# Patient Record
Sex: FEMALE | Race: WHITE | NOT HISPANIC OR LATINO | Employment: STUDENT | URBAN - METROPOLITAN AREA
[De-identification: names, ages, dates, MRNs, and addresses within clinical notes are randomized per-mention and may not be internally consistent; named-entity substitution may affect disease eponyms.]

---

## 2018-05-22 RX ORDER — MULTIVITAMIN/IRON/FOLIC ACID 18MG-0.4MG
1 TABLET ORAL DAILY
Refills: 1 | COMMUNITY
Start: 2018-04-03 | End: 2020-05-01 | Stop reason: ALTCHOICE

## 2018-05-23 ENCOUNTER — OFFICE VISIT (OUTPATIENT)
Dept: INTERNAL MEDICINE CLINIC | Facility: CLINIC | Age: 18
End: 2018-05-23
Payer: COMMERCIAL

## 2018-05-23 ENCOUNTER — TELEPHONE (OUTPATIENT)
Dept: INTERNAL MEDICINE CLINIC | Facility: CLINIC | Age: 18
End: 2018-05-23

## 2018-05-23 ENCOUNTER — APPOINTMENT (OUTPATIENT)
Dept: LAB | Facility: HOSPITAL | Age: 18
End: 2018-05-23
Payer: COMMERCIAL

## 2018-05-23 VITALS
SYSTOLIC BLOOD PRESSURE: 104 MMHG | BODY MASS INDEX: 39.15 KG/M2 | WEIGHT: 235 LBS | HEIGHT: 65 IN | HEART RATE: 94 BPM | OXYGEN SATURATION: 98 % | DIASTOLIC BLOOD PRESSURE: 86 MMHG

## 2018-05-23 DIAGNOSIS — L73.2 HIDRADENITIS SUPPURATIVA: Primary | ICD-10-CM

## 2018-05-23 DIAGNOSIS — K50.119 CROHN'S DISEASE OF COLON WITH COMPLICATION (HCC): ICD-10-CM

## 2018-05-23 DIAGNOSIS — R35.89 POLYURIA: ICD-10-CM

## 2018-05-23 DIAGNOSIS — R63.5 WEIGHT GAIN, ABNORMAL: ICD-10-CM

## 2018-05-23 PROBLEM — K52.9 IBD (INFLAMMATORY BOWEL DISEASE): Status: ACTIVE | Noted: 2017-09-06

## 2018-05-23 LAB
25(OH)D3 SERPL-MCNC: 23 NG/ML (ref 30–100)
ALBUMIN SERPL BCP-MCNC: 3.8 G/DL (ref 3.5–5)
ALP SERPL-CCNC: 79 U/L (ref 46–384)
ALT SERPL W P-5'-P-CCNC: 31 U/L (ref 12–78)
ANION GAP SERPL CALCULATED.3IONS-SCNC: 8 MMOL/L (ref 4–13)
AST SERPL W P-5'-P-CCNC: 21 U/L (ref 5–45)
BACTERIA UR QL AUTO: ABNORMAL /HPF
BASOPHILS # BLD AUTO: 0.07 THOUSANDS/ΜL (ref 0–0.1)
BASOPHILS NFR BLD AUTO: 1 % (ref 0–1)
BILIRUB SERPL-MCNC: 0.8 MG/DL (ref 0.2–1)
BILIRUB UR QL STRIP: NEGATIVE
BUN SERPL-MCNC: 11 MG/DL (ref 5–25)
CALCIUM SERPL-MCNC: 9.5 MG/DL (ref 8.3–10.1)
CHLORIDE SERPL-SCNC: 103 MMOL/L (ref 100–108)
CHOLEST SERPL-MCNC: 113 MG/DL (ref 50–200)
CLARITY UR: ABNORMAL
CO2 SERPL-SCNC: 28 MMOL/L (ref 21–32)
COLOR UR: YELLOW
CREAT SERPL-MCNC: 0.89 MG/DL (ref 0.6–1.3)
EOSINOPHIL # BLD AUTO: 0.41 THOUSAND/ΜL (ref 0–0.61)
EOSINOPHIL NFR BLD AUTO: 4 % (ref 0–6)
ERYTHROCYTE [DISTWIDTH] IN BLOOD BY AUTOMATED COUNT: 12.9 % (ref 11.6–15.1)
GFR SERPL CREATININE-BSD FRML MDRD: 94 ML/MIN/1.73SQ M
GLUCOSE P FAST SERPL-MCNC: 91 MG/DL (ref 65–99)
GLUCOSE UR STRIP-MCNC: NEGATIVE MG/DL
HCT VFR BLD AUTO: 44.1 % (ref 34.8–46.1)
HDLC SERPL-MCNC: 43 MG/DL (ref 40–60)
HGB BLD-MCNC: 14.4 G/DL (ref 11.5–15.4)
HGB UR QL STRIP.AUTO: ABNORMAL
KETONES UR STRIP-MCNC: NEGATIVE MG/DL
LDLC SERPL CALC-MCNC: 55 MG/DL (ref 0–100)
LEUKOCYTE ESTERASE UR QL STRIP: ABNORMAL
LYMPHOCYTES # BLD AUTO: 1.95 THOUSANDS/ΜL (ref 0.6–4.47)
LYMPHOCYTES NFR BLD AUTO: 17 % (ref 14–44)
MCH RBC QN AUTO: 29.4 PG (ref 26.8–34.3)
MCHC RBC AUTO-ENTMCNC: 32.7 G/DL (ref 31.4–37.4)
MCV RBC AUTO: 90 FL (ref 82–98)
MONOCYTES # BLD AUTO: 0.66 THOUSAND/ΜL (ref 0.17–1.22)
MONOCYTES NFR BLD AUTO: 6 % (ref 4–12)
NEUTROPHILS # BLD AUTO: 8.43 THOUSANDS/ΜL (ref 1.85–7.62)
NEUTS SEG NFR BLD AUTO: 73 % (ref 43–75)
NITRITE UR QL STRIP: NEGATIVE
NON-SQ EPI CELLS URNS QL MICRO: ABNORMAL /HPF
NONHDLC SERPL-MCNC: 70 MG/DL
NRBC BLD AUTO-RTO: 0 /100 WBCS
PH UR STRIP.AUTO: 6 [PH] (ref 4.5–8)
PLATELET # BLD AUTO: 342 THOUSANDS/UL (ref 149–390)
PMV BLD AUTO: 10.1 FL (ref 8.9–12.7)
POTASSIUM SERPL-SCNC: 3.9 MMOL/L (ref 3.5–5.3)
PROT SERPL-MCNC: 8.9 G/DL (ref 6.4–8.2)
PROT UR STRIP-MCNC: ABNORMAL MG/DL
RBC # BLD AUTO: 4.9 MILLION/UL (ref 3.81–5.12)
RBC #/AREA URNS AUTO: ABNORMAL /HPF
SL AMB POCT HEMOGLOBIN AIC: 5.4
SODIUM SERPL-SCNC: 139 MMOL/L (ref 136–145)
SP GR UR STRIP.AUTO: 1.02 (ref 1–1.03)
TRIGL SERPL-MCNC: 74 MG/DL
TSH SERPL DL<=0.05 MIU/L-ACNC: 1.49 UIU/ML (ref 0.46–3.98)
UROBILINOGEN UR QL STRIP.AUTO: 0.2 E.U./DL
WBC # BLD AUTO: 11.52 THOUSAND/UL (ref 4.31–10.16)
WBC #/AREA URNS AUTO: ABNORMAL /HPF

## 2018-05-23 PROCEDURE — 82306 VITAMIN D 25 HYDROXY: CPT | Performed by: INTERNAL MEDICINE

## 2018-05-23 PROCEDURE — 84443 ASSAY THYROID STIM HORMONE: CPT | Performed by: INTERNAL MEDICINE

## 2018-05-23 PROCEDURE — 81001 URINALYSIS AUTO W/SCOPE: CPT | Performed by: INTERNAL MEDICINE

## 2018-05-23 PROCEDURE — 3725F SCREEN DEPRESSION PERFORMED: CPT | Performed by: INTERNAL MEDICINE

## 2018-05-23 PROCEDURE — 80061 LIPID PANEL: CPT | Performed by: INTERNAL MEDICINE

## 2018-05-23 PROCEDURE — 99204 OFFICE O/P NEW MOD 45 MIN: CPT | Performed by: INTERNAL MEDICINE

## 2018-05-23 PROCEDURE — 36415 COLL VENOUS BLD VENIPUNCTURE: CPT | Performed by: INTERNAL MEDICINE

## 2018-05-23 PROCEDURE — 80053 COMPREHEN METABOLIC PANEL: CPT | Performed by: INTERNAL MEDICINE

## 2018-05-23 PROCEDURE — 85025 COMPLETE CBC W/AUTO DIFF WBC: CPT | Performed by: INTERNAL MEDICINE

## 2018-05-23 PROCEDURE — 83036 HEMOGLOBIN GLYCOSYLATED A1C: CPT | Performed by: INTERNAL MEDICINE

## 2018-05-23 RX ORDER — BIOTIN 1 MG
1000 TABLET ORAL
COMMUNITY
End: 2020-05-01 | Stop reason: ALTCHOICE

## 2018-05-23 RX ORDER — ACETAMINOPHEN 10 MG/ML
650 INJECTION, SOLUTION INTRAVENOUS EVERY 6 HOURS
COMMUNITY
End: 2020-05-01 | Stop reason: ALTCHOICE

## 2018-05-23 NOTE — PROGRESS NOTES
Assessment/Plan:    No problem-specific Assessment & Plan notes found for this encounter  Diagnoses and all orders for this visit:    Hidradenitis suppurativa  -     Ambulatory referral to Dermatology; Future    Crohn's disease of colon with complication [facility])  -     Ambulatory referral to Gastroenterology; Future  -     CBC and differential  -     Comprehensive metabolic panel    Weight gain, abnormal  -     Comprehensive metabolic panel  -     Lipid panel  -     TSH, 3rd generation with T4 reflex  -     Vitamin D 25 hydroxy  -     POCT hemoglobin A1c    Polyuria  -     Urinalysis with microscopic    Other orders  -     Biotin 1000 MCG tablet; Take 1,000 mcg by mouth  -     Adalimumab 40 MG/0 8ML PNKT; Inject 40 mg under the skin  -     acetaminophen (OFIRMEV); Take 650 mg by mouth every 6 (six) hours        Subjective:      Patient ID: Alejandra Motley is a 21 y o  female  This is a pleasant 21year-old female here to establish care  Her past medical history significant for Crohn's disease when she was 13years of age  She has been with Dr Peggy Darden for last several years and has been on Humira  She has Crohn's disease moderate to severe without any complications  including fistula  She has had few flare up   About 1 a year for which she has required prednisone  The CT scan done a few years ago as well as a colonoscopy reports is in the chart and was reviewed today  She also has Hydradenitis suppurative   By description of her cystic nodular pustules that she gets on her armpits and groin 1st last several years  She has a seen a dermatologist in the past and has received antibiotics and incision and drainage without much help to her condition  She also mentions weight gain about 15 lb the last 5 months  She also voices polyphagia coli dip see a and polyuria  Hemoglobin A1c in the office was 5 4  She does not report any menstrual regularity          The following portions of the patient's history were reviewed and updated as appropriate: allergies, current medications, past family history, past medical history, past social history, past surgical history and problem list       Review of Systems   Constitutional: Positive for unexpected weight change (15 lb  weight gain last 5 months)  Negative for activity change, chills and fever  HENT: Negative for congestion, facial swelling, sore throat and trouble swallowing  Eyes: Negative for pain  Respiratory: Negative for cough and shortness of breath  Cardiovascular: Negative for chest pain and leg swelling  Gastrointestinal: Positive for abdominal pain ( see above) and diarrhea  Negative for nausea and vomiting  Endocrine: Positive for polydipsia, polyphagia and polyuria  Negative for cold intolerance and heat intolerance  Genitourinary: Negative for difficulty urinating and dysuria  Musculoskeletal: Negative for arthralgias  Skin:         History of pustular outbreaks in the axilla as well as groin for the last 5-6 years  Presentation consistent with an  Hidradenitis suppurative   Neurological: Negative for dizziness and weakness  Psychiatric/Behavioral: Negative for dysphoric mood  The patient is not nervous/anxious  Current Outpatient Prescriptions:     Adalimumab 40 MG/0 8ML PNKT, Inject 40 mg under the skin, Disp: , Rfl:     acetaminophen (OFIRMEV), Take 650 mg by mouth every 6 (six) hours, Disp: , Rfl:     Biotin 1000 MCG tablet, Take 1,000 mcg by mouth, Disp: , Rfl:     Multiple Vitamins-Minerals ([employer] SPECTRAVITE ULTRA WOMEN) TABS, Take 1 tablet by mouth daily, Disp: , Rfl: 1    Objective:      /86 (BP Location: Left arm, Patient Position: Sitting, Cuff Size: Extra-Large)   Pulse 94   Ht 5' 5" (1 651 m)   Wt 107 kg (235 lb)   SpO2 98%   BMI 39 11 kg/m²          Physical Exam   Constitutional: She is oriented to person, place, and time  She appears well-nourished  No distress     HENT:   Mouth/Throat: Oropharyngeal exudate present  Eyes: Conjunctivae are normal  No scleral icterus  Neck: No thyromegaly present  Cardiovascular: Normal rate and normal heart sounds  No murmur heard  Pulmonary/Chest: Effort normal and breath sounds normal  No respiratory distress  She has no wheezes  She has no rales  Abdominal: Soft  Bowel sounds are normal  She exhibits no distension  There is tenderness (Diffuse tenderness)  There is no rebound and no guarding  purplish striae lower abdomen   Musculoskeletal: Normal range of motion  She exhibits no edema  Lymphadenopathy:     She has no cervical adenopathy  Neurological: She is alert and oriented to person, place, and time  Skin: Rash noted  Several scars on the armpit bilaterally as well as an groin upper thighs bilaterally  There is 1  Enlarged cyst   Psychiatric: She has a normal mood and affect   Her behavior is normal  Judgment and thought content normal

## 2018-05-23 NOTE — TELEPHONE ENCOUNTER
Patient is scheduled at [facility] phone #[phone] this Friday 5/25/2018 at 1pm  [address]  Left detailed message on patient cell

## 2018-05-23 NOTE — TELEPHONE ENCOUNTER
I contacted [facility] regarding Dr Melissa Neff being assigned as pcp to her [employer]  She was coming up as a specialist   I spoke with Louis Matos (5/23 @ 2:00) in the Utilization Mgt Dept ,  who did a one time approval for 5/23-5/29  Auth # [id number]  Louis Matos advised that Caryn Grullon call them to have everything taken care of  Monica Driscollgunner as much info as I could for when she reaches out to them

## 2018-05-25 ENCOUNTER — TELEPHONE (OUTPATIENT)
Dept: INTERNAL MEDICINE CLINIC | Facility: CLINIC | Age: 18
End: 2018-05-25

## 2018-06-12 ENCOUNTER — OFFICE VISIT (OUTPATIENT)
Dept: INTERNAL MEDICINE CLINIC | Facility: CLINIC | Age: 18
End: 2018-06-12
Payer: COMMERCIAL

## 2018-06-12 VITALS
OXYGEN SATURATION: 98 % | HEIGHT: 65 IN | WEIGHT: 240 LBS | DIASTOLIC BLOOD PRESSURE: 78 MMHG | SYSTOLIC BLOOD PRESSURE: 116 MMHG | BODY MASS INDEX: 39.99 KG/M2 | HEART RATE: 93 BPM

## 2018-06-12 DIAGNOSIS — L73.2 SUPPURATIVE HIDRADENITIS: ICD-10-CM

## 2018-06-12 DIAGNOSIS — D72.829 LEUKOCYTOSIS, UNSPECIFIED TYPE: Primary | ICD-10-CM

## 2018-06-12 DIAGNOSIS — R63.5 WEIGHT GAIN, ABNORMAL: ICD-10-CM

## 2018-06-12 PROCEDURE — 99214 OFFICE O/P EST MOD 30 MIN: CPT | Performed by: INTERNAL MEDICINE

## 2018-06-12 PROCEDURE — 3008F BODY MASS INDEX DOCD: CPT | Performed by: INTERNAL MEDICINE

## 2018-06-12 RX ORDER — DOXYCYCLINE HYCLATE 100 MG/1
100 CAPSULE ORAL EVERY 12 HOURS SCHEDULED
Qty: 14 CAPSULE | Refills: 0 | Status: SHIPPED | OUTPATIENT
Start: 2018-06-12 | End: 2018-06-19

## 2018-06-12 NOTE — PROGRESS NOTES
Assessment/Plan:         Diagnoses and all orders for this visit:    Leukocytosis, unspecified type  -     CBC and differential   likely reactive to the boil that she had at the time of lab testing  Repeat CBC when she does not have any active infection  Suppurative hidradenitis  -     Ambulatory referral to Dermatology; Future  -     doxycycline hyclate (VIBRAMYCIN) 100 mg capsule; Take 1 capsule (100 mg total) by mouth every 12 (twelve) hours for 7 days     She may be a candidate of dapsone because of the severity of her condition  Defer this to Dermatology  Weight gain, abnormal      diet modification with watching portion size  Carbohydrate intake exercise regularly was discussed with the patient  I will be seeing her back in 3 months to reassess her blood pressure  Subjective:      Patient ID: Lucia Zavala is a 21 y o  female  Patient with history of Crohn's disease currently on Humira doing quite well from GI perspective is here to follow-up on recent lab studies  She was found to have a normal blood glucose with good hemoglobin A1c  She is quite distressed by her continues weight gain and does report polyphagia  Her TSH was normal   Her periods have been regular  She does suffer from acne as well as   Hidradenitis suppurative   She does not have an appointment with her dermatologist to revisit the problem  She had mild leukocytosis but had a boil at  the time of her lab studies  The following portions of the patient's history were reviewed and updated as appropriate: allergies, current medications, past family history, past medical history, past social history, past surgical history and problem list     Review of Systems   Constitutional: Positive for unexpected weight change  Negative for chills and fever  HENT: Negative for sore throat  Respiratory: Negative for cough and shortness of breath      Gastrointestinal: Negative for abdominal pain, blood in stool (Currently stable), constipation and diarrhea (Currently stable)  Endocrine: Positive for polydipsia  Negative for cold intolerance and heat intolerance  Musculoskeletal: Negative for back pain  Skin: Positive for rash (See above)  Neurological: Negative for dizziness and headaches  Psychiatric/Behavioral: Negative for agitation and dysphoric mood  The patient is not nervous/anxious  Patient Active Problem List   Diagnosis    IBD (inflammatory bowel disease)    Vitamin deficiency     Current Outpatient Prescriptions:     acetaminophen (OFIRMEV), Take 650 mg by mouth every 6 (six) hours, Disp: , Rfl:     Adalimumab 40 MG/0 8ML PNKT, Inject 40 mg under the skin, Disp: , Rfl:     Biotin 1000 MCG tablet, Take 1,000 mcg by mouth, Disp: , Rfl:     Multiple Vitamins-Minerals ([employer] SPECTRAVITE ULTRA WOMEN) TABS, Take 1 tablet by mouth daily, Disp: , Rfl: 1    doxycycline hyclate (VIBRAMYCIN) 100 mg capsule, Take 1 capsule (100 mg total) by mouth every 12 (twelve) hours for 7 days, Disp: 14 capsule, Rfl: 0  Objective:      /78 (BP Location: Left arm, Patient Position: Sitting, Cuff Size: Extra-Large)   Pulse 93   Ht 5' 5" (1 651 m)   Wt 109 kg (240 lb)   SpO2 98%   BMI 39 94 kg/m²          Physical Exam   Constitutional: She is oriented to person, place, and time  No distress  Eyes: Conjunctivae are normal  No scleral icterus  Cardiovascular: Normal rate, regular rhythm and normal heart sounds  Pulmonary/Chest: Effort normal and breath sounds normal  No respiratory distress  She has no rales  Abdominal: She exhibits no distension  There is no tenderness  There is no rebound and no guarding  Musculoskeletal: She exhibits no edema  Neurological: She is alert and oriented to person, place, and time  Skin: Rash (Flat  right thigh boil with no further drainage or discharge  Good granulation tissue  This another boil on the left side  Scar tissues and open commedones ) noted

## 2018-09-14 ENCOUNTER — OFFICE VISIT (OUTPATIENT)
Dept: INTERNAL MEDICINE CLINIC | Facility: CLINIC | Age: 18
End: 2018-09-14
Payer: COMMERCIAL

## 2018-09-14 VITALS — HEART RATE: 86 BPM | OXYGEN SATURATION: 98 % | SYSTOLIC BLOOD PRESSURE: 118 MMHG | DIASTOLIC BLOOD PRESSURE: 72 MMHG

## 2018-09-14 DIAGNOSIS — R63.5 WEIGHT GAIN: Primary | ICD-10-CM

## 2018-09-14 PROCEDURE — 99214 OFFICE O/P EST MOD 30 MIN: CPT | Performed by: INTERNAL MEDICINE

## 2018-09-14 NOTE — PROGRESS NOTES
Assessment/Plan:         Diagnoses and all orders for this visit:    Weight gain  -     DHEA-sulfate  -     Insulin, random  -     17-Hydroxyprogesterone  -     Androsterone level  -     Testosterone, Free and Weakly Bound  -     metFORMIN (GLUCOPHAGE) 500 mg tablet; Take 1 tablet (500 mg total) by mouth 2 (two) times a day with meals      See discussion    Subjective:      Patient ID: Ginger Louis is a 21 y o  female  Patient is here to follow-up on hidradenitis suppurativa  She has appointment with Dermatology in 4 months  Apparently a communication feel to happen between Dermatology in the patient's for an earlier appointment was arranged by my office  We will try to get another appointment sooner than 4 months  Patient continues to have flare ups  She also has a not taking Humira as she has a process the finding of the gastroenterologist    Fortunately this is coming up soon  He denies any abdominal pains or cramps  She has not been able to lose weight despite exercising and watching her diet very closely  She denies any menstrual irregularity  She will following up with her gynecologist as well  We discussed insulin resistant weight gain PCOS will be ordered  Metformin to be called in  Side effects reviewed  I will see her back in 1-3 months  The following portions of the patient's history were reviewed and updated as appropriate: allergies, current medications, past medical history, past social history and problem list     Review of Systems   HENT: Positive for postnasal drip  Respiratory: Negative for shortness of breath  Cardiovascular: Negative for leg swelling  Genitourinary: Negative for menstrual problem  Skin:         Hidradenitis suppurative   Neurological: Negative for dizziness           Objective:      /72 (BP Location: Left arm, Patient Position: Sitting, Cuff Size: Extra-Large)   Pulse 86   SpO2 98%          Physical Exam   Constitutional: She is oriented to person, place, and time  High BMI noted   HENT:   Posterior pharynx crowded   Eyes: Conjunctivae are normal  No scleral icterus  Cardiovascular: Normal rate, regular rhythm and normal heart sounds  No murmur heard  Pulmonary/Chest: Effort normal and breath sounds normal  No respiratory distress  She has no wheezes  She has no rales  Abdominal: Bowel sounds are normal  She exhibits no distension  There is no tenderness  There is no rebound and no guarding  Musculoskeletal: She exhibits no edema  Neurological: She is alert and oriented to person, place, and time

## 2018-09-22 ENCOUNTER — APPOINTMENT (OUTPATIENT)
Dept: LAB | Facility: HOSPITAL | Age: 18
End: 2018-09-22
Payer: COMMERCIAL

## 2018-09-22 LAB
BASOPHILS # BLD AUTO: 0.1 THOUSANDS/ΜL (ref 0–0.1)
BASOPHILS NFR BLD AUTO: 1 % (ref 0–1)
EOSINOPHIL # BLD AUTO: 0.2 THOUSAND/ΜL (ref 0–0.4)
EOSINOPHIL NFR BLD AUTO: 2 % (ref 0–6)
ERYTHROCYTE [DISTWIDTH] IN BLOOD BY AUTOMATED COUNT: 12.8 %
HCT VFR BLD AUTO: 41.9 % (ref 36–46)
HGB BLD-MCNC: 13.8 G/DL (ref 12–16)
INSULIN SERPL-ACNC: 15.3 MU/L (ref 3–25)
LYMPHOCYTES # BLD AUTO: 2 THOUSANDS/ΜL (ref 0.5–4)
LYMPHOCYTES NFR BLD AUTO: 23 % (ref 20–50)
MCH RBC QN AUTO: 29.7 PG (ref 26–34)
MCHC RBC AUTO-ENTMCNC: 33.1 G/DL (ref 31–36)
MCV RBC AUTO: 90 FL (ref 80–100)
MONOCYTES # BLD AUTO: 0.7 THOUSAND/ΜL (ref 0.2–0.9)
MONOCYTES NFR BLD AUTO: 8 % (ref 1–10)
NEUTROPHILS # BLD AUTO: 5.9 THOUSANDS/ΜL (ref 1.8–7.8)
NEUTS SEG NFR BLD AUTO: 66 % (ref 45–65)
PLATELET # BLD AUTO: 339 THOUSANDS/UL (ref 150–450)
PMV BLD AUTO: 8 FL (ref 8.9–12.7)
RBC # BLD AUTO: 4.67 MILLION/UL (ref 4–5.2)
WBC # BLD AUTO: 8.9 THOUSAND/UL (ref 4.5–11)

## 2018-09-22 PROCEDURE — 84402 ASSAY OF FREE TESTOSTERONE: CPT | Performed by: INTERNAL MEDICINE

## 2018-09-22 PROCEDURE — 83498 ASY HYDROXYPROGESTERONE 17-D: CPT | Performed by: INTERNAL MEDICINE

## 2018-09-22 PROCEDURE — 82627 DEHYDROEPIANDROSTERONE: CPT | Performed by: INTERNAL MEDICINE

## 2018-09-22 PROCEDURE — 36415 COLL VENOUS BLD VENIPUNCTURE: CPT | Performed by: INTERNAL MEDICINE

## 2018-09-22 PROCEDURE — 85025 COMPLETE CBC W/AUTO DIFF WBC: CPT | Performed by: INTERNAL MEDICINE

## 2018-09-22 PROCEDURE — 82160 ASSAY OF ANDROSTERONE: CPT | Performed by: INTERNAL MEDICINE

## 2018-09-22 PROCEDURE — 83525 ASSAY OF INSULIN: CPT | Performed by: INTERNAL MEDICINE

## 2018-09-22 PROCEDURE — 84403 ASSAY OF TOTAL TESTOSTERONE: CPT | Performed by: INTERNAL MEDICINE

## 2018-09-23 LAB — DHEA-S SERPL-MCNC: 189.2 UG/DL (ref 110–433.2)

## 2018-09-25 LAB
DEPRECATED TESTOST FREE FR SERPL: 10.1 NG/DL (ref 0–9.5)
TESTOST SERPL-MCNC: 37 NG/DL
TESTOSTERONE.FREE+WB MFR SERPL: 27.2 % (ref 3–18)

## 2018-09-25 NOTE — PROGRESS NOTES
Patient did not start metformin yet   She thought she was suppose to wait until lab results came back so she will start this and call us with any concerns

## 2018-09-26 LAB — 17OHP SERPL-MCNC: 45 NG/DL

## 2018-10-03 LAB — ANDROSTERONE SERPL-MCNC: 25 NG/DL

## 2018-10-29 ENCOUNTER — TELEPHONE (OUTPATIENT)
Dept: GASTROENTEROLOGY | Facility: MEDICAL CENTER | Age: 18
End: 2018-10-29

## 2018-11-19 DIAGNOSIS — R63.5 WEIGHT GAIN: ICD-10-CM

## 2018-11-30 DIAGNOSIS — R63.5 WEIGHT GAIN: ICD-10-CM

## 2019-03-13 DIAGNOSIS — R63.5 WEIGHT GAIN: ICD-10-CM

## 2019-05-22 ENCOUNTER — TELEPHONE (OUTPATIENT)
Dept: FAMILY MEDICINE CLINIC | Facility: CLINIC | Age: 19
End: 2019-05-22

## 2019-09-16 ENCOUNTER — TELEPHONE (OUTPATIENT)
Dept: FAMILY MEDICINE CLINIC | Facility: CLINIC | Age: 19
End: 2019-09-16

## 2019-09-18 DIAGNOSIS — R63.5 WEIGHT GAIN: ICD-10-CM

## 2020-04-17 ENCOUNTER — APPOINTMENT (OUTPATIENT)
Dept: LAB | Facility: HOSPITAL | Age: 20
End: 2020-04-17
Payer: COMMERCIAL

## 2020-04-17 ENCOUNTER — TELEMEDICINE (OUTPATIENT)
Dept: FAMILY MEDICINE CLINIC | Facility: CLINIC | Age: 20
End: 2020-04-17
Payer: COMMERCIAL

## 2020-04-17 DIAGNOSIS — K52.9 IBD (INFLAMMATORY BOWEL DISEASE): Primary | ICD-10-CM

## 2020-04-17 DIAGNOSIS — K92.1 MELENA: ICD-10-CM

## 2020-04-17 LAB
ALBUMIN SERPL BCP-MCNC: 4.1 G/DL (ref 3–5.2)
ALP SERPL-CCNC: 70 U/L (ref 43–122)
ALT SERPL W P-5'-P-CCNC: 30 U/L (ref 9–52)
ANION GAP SERPL CALCULATED.3IONS-SCNC: 9 MMOL/L (ref 5–14)
AST SERPL W P-5'-P-CCNC: 22 U/L (ref 14–36)
BASOPHILS # BLD AUTO: 0.4 THOUSAND/UL (ref 0–0.1)
BASOPHILS NFR MAR MANUAL: 4 % (ref 0–1)
BILIRUB SERPL-MCNC: 0.5 MG/DL
BUN SERPL-MCNC: 8 MG/DL (ref 5–25)
CALCIUM SERPL-MCNC: 9.3 MG/DL (ref 8.4–10.2)
CHLORIDE SERPL-SCNC: 104 MMOL/L (ref 97–108)
CO2 SERPL-SCNC: 26 MMOL/L (ref 22–30)
CREAT SERPL-MCNC: 0.74 MG/DL (ref 0.6–1.2)
EOSINOPHIL # BLD AUTO: 0.8 THOUSAND/UL (ref 0–0.4)
EOSINOPHIL NFR BLD MANUAL: 8 % (ref 0–6)
ERYTHROCYTE [DISTWIDTH] IN BLOOD BY AUTOMATED COUNT: 13.5 %
FERRITIN SERPL-MCNC: 35 NG/ML (ref 8–388)
GFR SERPL CREATININE-BSD FRML MDRD: 116 ML/MIN/1.73SQ M
GLUCOSE P FAST SERPL-MCNC: 97 MG/DL (ref 70–99)
HCT VFR BLD AUTO: 39.5 % (ref 36–46)
HGB BLD-MCNC: 13.3 G/DL (ref 12–16)
IRON SATN MFR SERPL: 12 %
IRON SERPL-MCNC: 36 UG/DL (ref 50–170)
LYMPHOCYTES # BLD AUTO: 1.9 THOUSAND/UL (ref 0.5–4)
LYMPHOCYTES # BLD AUTO: 19 % (ref 25–45)
MCH RBC QN AUTO: 29.5 PG (ref 26–34)
MCHC RBC AUTO-ENTMCNC: 33.7 G/DL (ref 31–36)
MCV RBC AUTO: 88 FL (ref 80–100)
MONOCYTES # BLD AUTO: 0.6 THOUSAND/UL (ref 0.2–0.9)
MONOCYTES NFR BLD AUTO: 6 % (ref 1–10)
NEUTS BAND NFR BLD MANUAL: 4 % (ref 0–8)
NEUTS SEG # BLD: 6.3 THOUSAND/UL (ref 1.8–7.8)
NEUTS SEG NFR BLD AUTO: 59 %
PLATELET # BLD AUTO: 369 THOUSANDS/UL (ref 150–450)
PLATELET BLD QL SMEAR: ADEQUATE
PMV BLD AUTO: 8.2 FL (ref 8.9–12.7)
POTASSIUM SERPL-SCNC: 3.8 MMOL/L (ref 3.6–5)
PROT SERPL-MCNC: 8 G/DL (ref 5.9–8.4)
RBC # BLD AUTO: 4.52 MILLION/UL (ref 4–5.2)
RBC MORPH BLD: NORMAL
SODIUM SERPL-SCNC: 139 MMOL/L (ref 137–147)
TIBC SERPL-MCNC: 299 UG/DL (ref 250–450)
TOTAL CELLS COUNTED SPEC: 100
WBC # BLD AUTO: 10 THOUSAND/UL (ref 4.5–11)

## 2020-04-17 PROCEDURE — 36415 COLL VENOUS BLD VENIPUNCTURE: CPT | Performed by: INTERNAL MEDICINE

## 2020-04-17 PROCEDURE — 83540 ASSAY OF IRON: CPT | Performed by: INTERNAL MEDICINE

## 2020-04-17 PROCEDURE — 85007 BL SMEAR W/DIFF WBC COUNT: CPT | Performed by: INTERNAL MEDICINE

## 2020-04-17 PROCEDURE — 80053 COMPREHEN METABOLIC PANEL: CPT | Performed by: INTERNAL MEDICINE

## 2020-04-17 PROCEDURE — 83550 IRON BINDING TEST: CPT | Performed by: INTERNAL MEDICINE

## 2020-04-17 PROCEDURE — 85027 COMPLETE CBC AUTOMATED: CPT | Performed by: INTERNAL MEDICINE

## 2020-04-17 PROCEDURE — 99214 OFFICE O/P EST MOD 30 MIN: CPT | Performed by: INTERNAL MEDICINE

## 2020-04-17 PROCEDURE — 82728 ASSAY OF FERRITIN: CPT | Performed by: INTERNAL MEDICINE

## 2020-04-17 RX ORDER — FAMOTIDINE 20 MG/1
20 TABLET, FILM COATED ORAL 2 TIMES DAILY
COMMUNITY
Start: 2020-02-09 | End: 2020-05-01 | Stop reason: ALTCHOICE

## 2020-04-17 RX ORDER — ONDANSETRON 4 MG/1
4 TABLET, ORALLY DISINTEGRATING ORAL EVERY 8 HOURS PRN
COMMUNITY
Start: 2018-09-25 | End: 2020-05-01 | Stop reason: ALTCHOICE

## 2020-04-17 RX ORDER — PREDNISONE 10 MG/1
TABLET ORAL
COMMUNITY
Start: 2020-02-09 | End: 2020-05-01 | Stop reason: ALTCHOICE

## 2020-04-20 ENCOUNTER — TELEPHONE (OUTPATIENT)
Dept: FAMILY MEDICINE CLINIC | Facility: CLINIC | Age: 20
End: 2020-04-20

## 2020-04-20 DIAGNOSIS — E61.1 IRON DEFICIENCY: Primary | ICD-10-CM

## 2020-04-20 RX ORDER — ASCORBIC ACID 500 MG
500 TABLET ORAL DAILY
Qty: 30 TABLET | Refills: 1 | Status: SHIPPED | OUTPATIENT
Start: 2020-04-20 | End: 2020-06-22

## 2020-04-20 RX ORDER — FERROUS SULFATE TAB EC 324 MG (65 MG FE EQUIVALENT) 324 (65 FE) MG
324 TABLET DELAYED RESPONSE ORAL
Qty: 30 TABLET | Refills: 1 | Status: SHIPPED | OUTPATIENT
Start: 2020-04-20 | End: 2020-06-22

## 2020-05-01 ENCOUNTER — TELEMEDICINE (OUTPATIENT)
Dept: FAMILY MEDICINE CLINIC | Facility: CLINIC | Age: 20
End: 2020-05-01
Payer: COMMERCIAL

## 2020-05-01 ENCOUNTER — TELEPHONE (OUTPATIENT)
Dept: FAMILY MEDICINE CLINIC | Facility: CLINIC | Age: 20
End: 2020-05-01

## 2020-05-01 DIAGNOSIS — K52.9 IBD (INFLAMMATORY BOWEL DISEASE): ICD-10-CM

## 2020-05-01 DIAGNOSIS — N30.00 ACUTE CYSTITIS WITHOUT HEMATURIA: Primary | ICD-10-CM

## 2020-05-01 PROCEDURE — 99213 OFFICE O/P EST LOW 20 MIN: CPT | Performed by: INTERNAL MEDICINE

## 2020-05-01 RX ORDER — NITROFURANTOIN 25; 75 MG/1; MG/1
100 CAPSULE ORAL 2 TIMES DAILY
Qty: 10 CAPSULE | Refills: 0 | Status: SHIPPED | OUTPATIENT
Start: 2020-05-01 | End: 2020-12-01 | Stop reason: SDUPTHER

## 2020-05-11 ENCOUNTER — LAB REQUISITION (OUTPATIENT)
Dept: LAB | Facility: HOSPITAL | Age: 20
End: 2020-05-11
Payer: COMMERCIAL

## 2020-05-11 ENCOUNTER — OFFICE VISIT (OUTPATIENT)
Dept: FAMILY MEDICINE CLINIC | Facility: CLINIC | Age: 20
End: 2020-05-11
Payer: COMMERCIAL

## 2020-05-11 VITALS
BODY MASS INDEX: 40.02 KG/M2 | HEIGHT: 65 IN | RESPIRATION RATE: 18 BRPM | TEMPERATURE: 99 F | DIASTOLIC BLOOD PRESSURE: 84 MMHG | SYSTOLIC BLOOD PRESSURE: 120 MMHG | HEART RATE: 102 BPM | OXYGEN SATURATION: 98 % | WEIGHT: 240.2 LBS

## 2020-05-11 DIAGNOSIS — N30.00 ACUTE CYSTITIS WITHOUT HEMATURIA: ICD-10-CM

## 2020-05-11 DIAGNOSIS — R35.0 URINARY FREQUENCY: Primary | ICD-10-CM

## 2020-05-11 DIAGNOSIS — R35.0 FREQUENCY OF MICTURITION: ICD-10-CM

## 2020-05-11 DIAGNOSIS — R33.9 URINARY RETENTION: ICD-10-CM

## 2020-05-11 DIAGNOSIS — Z16.20 THERAPY FAILURE DUE TO ANTIBIOTIC RESISTANCE: ICD-10-CM

## 2020-05-11 DIAGNOSIS — R33.9 RETENTION OF URINE, UNSPECIFIED: ICD-10-CM

## 2020-05-11 DIAGNOSIS — N12 PYELONEPHRITIS: ICD-10-CM

## 2020-05-11 DIAGNOSIS — K52.9 IBD (INFLAMMATORY BOWEL DISEASE): ICD-10-CM

## 2020-05-11 LAB
BILIRUB UR QL STRIP: NEGATIVE
CLARITY UR: CLEAR
COLOR UR: YELLOW
GLUCOSE UR STRIP-MCNC: NEGATIVE MG/DL
HGB UR QL STRIP.AUTO: NEGATIVE
KETONES UR STRIP-MCNC: NEGATIVE MG/DL
LEUKOCYTE ESTERASE UR QL STRIP: NEGATIVE
NITRITE UR QL STRIP: NEGATIVE
PH UR STRIP.AUTO: 6.5 [PH]
PROT UR STRIP-MCNC: NEGATIVE MG/DL
SP GR UR STRIP.AUTO: 1 (ref 1–1.03)
UROBILINOGEN UR QL STRIP.AUTO: 0.2 E.U./DL

## 2020-05-11 PROCEDURE — 81003 URINALYSIS AUTO W/O SCOPE: CPT | Performed by: INTERNAL MEDICINE

## 2020-05-11 PROCEDURE — 99214 OFFICE O/P EST MOD 30 MIN: CPT | Performed by: INTERNAL MEDICINE

## 2020-05-11 PROCEDURE — 3008F BODY MASS INDEX DOCD: CPT | Performed by: INTERNAL MEDICINE

## 2020-05-11 RX ORDER — USTEKINUMAB 90 MG/ML
INJECTION, SOLUTION SUBCUTANEOUS
COMMUNITY
Start: 2020-05-07 | End: 2022-02-04 | Stop reason: DRUGHIGH

## 2020-05-11 RX ORDER — CIPROFLOXACIN 500 MG/1
500 TABLET, FILM COATED ORAL EVERY 12 HOURS SCHEDULED
Qty: 14 TABLET | Refills: 0 | Status: SHIPPED | OUTPATIENT
Start: 2020-05-11 | End: 2020-09-25 | Stop reason: SDUPTHER

## 2020-05-13 ENCOUNTER — TELEPHONE (OUTPATIENT)
Dept: FAMILY MEDICINE CLINIC | Facility: CLINIC | Age: 20
End: 2020-05-13

## 2020-05-13 ENCOUNTER — HOSPITAL ENCOUNTER (OUTPATIENT)
Dept: ULTRASOUND IMAGING | Facility: HOSPITAL | Age: 20
Discharge: HOME | End: 2020-05-13
Payer: COMMERCIAL

## 2020-05-13 PROCEDURE — 76770 US EXAM ABDO BACK WALL COMP: CPT

## 2020-06-17 ENCOUNTER — OFFICE VISIT (OUTPATIENT)
Dept: FAMILY MEDICINE CLINIC | Facility: CLINIC | Age: 20
End: 2020-06-17
Payer: COMMERCIAL

## 2020-06-17 VITALS
TEMPERATURE: 98.9 F | BODY MASS INDEX: 39.32 KG/M2 | HEART RATE: 94 BPM | SYSTOLIC BLOOD PRESSURE: 128 MMHG | WEIGHT: 236 LBS | HEIGHT: 65 IN | DIASTOLIC BLOOD PRESSURE: 80 MMHG | OXYGEN SATURATION: 97 %

## 2020-06-17 DIAGNOSIS — E56.9 VITAMIN DEFICIENCY: ICD-10-CM

## 2020-06-17 DIAGNOSIS — Z13.29 SCREENING FOR THYROID DISORDER: ICD-10-CM

## 2020-06-17 DIAGNOSIS — Z00.00 ANNUAL PHYSICAL EXAM: ICD-10-CM

## 2020-06-17 DIAGNOSIS — K52.9 IBD (INFLAMMATORY BOWEL DISEASE): Primary | ICD-10-CM

## 2020-06-17 DIAGNOSIS — Z13.220 LIPID SCREENING: ICD-10-CM

## 2020-06-17 DIAGNOSIS — E61.1 IRON DEFICIENCY: ICD-10-CM

## 2020-06-17 PROCEDURE — 99395 PREV VISIT EST AGE 18-39: CPT | Performed by: INTERNAL MEDICINE

## 2020-06-20 DIAGNOSIS — E61.1 IRON DEFICIENCY: ICD-10-CM

## 2020-06-22 RX ORDER — FERROUS SULFATE TAB EC 324 MG (65 MG FE EQUIVALENT) 324 (65 FE) MG
324 TABLET DELAYED RESPONSE ORAL
Qty: 30 TABLET | Refills: 1 | Status: SHIPPED | OUTPATIENT
Start: 2020-06-22 | End: 2020-08-18

## 2020-06-22 RX ORDER — ASCORBIC ACID 500 MG
TABLET ORAL
Qty: 30 TABLET | Refills: 1 | Status: SHIPPED | OUTPATIENT
Start: 2020-06-22 | End: 2020-09-18

## 2020-06-25 ENCOUNTER — TELEPHONE (OUTPATIENT)
Dept: FAMILY MEDICINE CLINIC | Facility: CLINIC | Age: 20
End: 2020-06-25

## 2020-06-25 DIAGNOSIS — E61.1 IRON DEFICIENCY: Primary | ICD-10-CM

## 2020-07-15 ENCOUNTER — TELEPHONE (OUTPATIENT)
Dept: OBGYN CLINIC | Facility: CLINIC | Age: 20
End: 2020-07-15

## 2020-07-16 ENCOUNTER — OFFICE VISIT (OUTPATIENT)
Dept: OBGYN CLINIC | Facility: CLINIC | Age: 20
End: 2020-07-16
Payer: COMMERCIAL

## 2020-07-16 VITALS
SYSTOLIC BLOOD PRESSURE: 118 MMHG | DIASTOLIC BLOOD PRESSURE: 72 MMHG | BODY MASS INDEX: 39.49 KG/M2 | HEIGHT: 65 IN | WEIGHT: 237 LBS | TEMPERATURE: 98.1 F | HEART RATE: 87 BPM

## 2020-07-16 DIAGNOSIS — Z30.011 ENCOUNTER FOR INITIAL PRESCRIPTION OF CONTRACEPTIVE PILLS: ICD-10-CM

## 2020-07-16 DIAGNOSIS — Z30.09 ENCOUNTER FOR COUNSELING REGARDING CONTRACEPTION: Primary | ICD-10-CM

## 2020-07-16 PROBLEM — L73.2 HIDRADENITIS SUPPURATIVA: Status: ACTIVE | Noted: 2020-07-16

## 2020-07-16 PROBLEM — E66.812 OBESITY, CLASS II, BMI 35-39.9: Status: ACTIVE | Noted: 2020-07-16

## 2020-07-16 PROBLEM — K50.90 CROHN'S DISEASE (HCC): Status: ACTIVE | Noted: 2020-07-16

## 2020-07-16 PROBLEM — E66.9 OBESITY, CLASS II, BMI 35-39.9: Status: ACTIVE | Noted: 2020-07-16

## 2020-07-16 PROCEDURE — 99203 OFFICE O/P NEW LOW 30 MIN: CPT | Performed by: NURSE PRACTITIONER

## 2020-07-16 PROCEDURE — 4004F PT TOBACCO SCREEN RCVD TLK: CPT | Performed by: NURSE PRACTITIONER

## 2020-07-16 PROCEDURE — 3008F BODY MASS INDEX DOCD: CPT | Performed by: NURSE PRACTITIONER

## 2020-07-16 RX ORDER — NORGESTIMATE AND ETHINYL ESTRADIOL 0.25-0.035
1 KIT ORAL DAILY
Qty: 28 TABLET | Refills: 3 | Status: SHIPPED | OUTPATIENT
Start: 2020-07-16 | End: 2020-10-29

## 2020-07-16 NOTE — PROGRESS NOTES
Assessment/Plan:    1  Encounter for counseling regarding contraception  Reviewed all contraceptive options: pill, patch, ring, injection, implant, IUD  Patient prefers to try OCP  2  Encounter for initial prescription of contraceptive pills  Patient desires oral contraceptive as her birth control method  She is an appropriate candidate  Reviewed potential side effects, adverse effects/ ACHES and how to initiate the pill  Prescription sent to her pharmacy  Return visit in 3 months for pill check- will do first yearly with pap at that time    - norgestimate-ethinyl estradiol (ORTHO-CYCLEN) 0 25-35 MG-MCG per tablet; Take 1 tablet by mouth daily  Dispense: 28 tablet; Refill: 3      Subjective:      Patient ID: Kimberly Cage is a 22 y o  female  HPI  NEW PATIENT PROBLEM  CC: contraceptive management    23 yo never sexually active female presents for discussion regarding contraception  She has a partner with who she plans to be sexually active  Menarche age 13; periods are regular with cramps  Neg hx of liver, gb, te disease, neg migraines  Occasional hookah use  BP normal     Past Medical History:   Diagnosis Date    [facility]     Hidradenitis suppurativa     thighs    Obesity, Class II, BMI 35-39 9     TMJ (temporomandibular joint disorder)     last assessed 02/24/2016     Past Surgical History:   Procedure Laterality Date    COLONOSCOPY      x several; last one 2/2020    WISDOM TOOTH EXTRACTION       Family History   Problem Relation Age of Onset    Diabetes Mother         Due to underlying condition with foot ulcer  Type 2 DM without complication      Diabetes Father     Polycystic ovary syndrome Sister     Stroke Neg Hx     Heart attack Neg Hx     Hypertension Neg Hx     Breast cancer Neg Hx     Colon cancer Neg Hx     Ovarian cancer Neg Hx     Uterine cancer Neg Hx      Current Outpatient Medications on File Prior to Visit   Medication Sig Dispense Refill    ascorbic acid (VITAMIN C) 500 mg tablet TAKE 1 TABLET BY MOUTH EVERY DAY 30 tablet 1    ferrous sulfate 324 (65 Fe) mg TAKE 1 TABLET (324 MG TOTAL) BY MOUTH DAILY BEFORE BREAKFAST 30 tablet 1    STELARA 90 MG/ML subcutaneous injection        No current facility-administered medications on file prior to visit  The following portions of the patient's history were reviewed and updated as appropriate: allergies, current medications, past family history, past medical history, past social history, past surgical history and problem list     Review of Systems   Gastrointestinal: Positive for abdominal pain (w/ bloody stool when Crohns flares)  Negative for constipation, diarrhea, nausea and vomiting  Genitourinary: Positive for menstrual problem  Negative for pelvic pain, urgency, vaginal bleeding and vaginal discharge  Objective:    /72 (BP Location: Left arm, Patient Position: Sitting, Cuff Size: Standard)   Pulse 87   Temp 98 1 °F (36 7 °C) (Tympanic)   Ht 5' 5" (1 651 m)   Wt 108 kg (237 lb)   LMP 06/17/2020 (Exact Date)   BMI 39 44 kg/m²      Physical Exam   Constitutional: She is oriented to person, place, and time  She appears well-developed and well-nourished  No distress  HENT:   Head: Atraumatic  Eyes: Pupils are equal, round, and reactive to light  Pulmonary/Chest: Effort normal    Neurological: She is alert and oriented to person, place, and time  Psychiatric: She has a normal mood and affect   Her behavior is normal  Thought content normal

## 2020-08-18 DIAGNOSIS — E61.1 IRON DEFICIENCY: ICD-10-CM

## 2020-08-18 RX ORDER — FERROUS SULFATE TAB EC 324 MG (65 MG FE EQUIVALENT) 324 (65 FE) MG
324 TABLET DELAYED RESPONSE ORAL
Qty: 30 TABLET | Refills: 1 | Status: SHIPPED | OUTPATIENT
Start: 2020-08-18 | End: 2020-08-31

## 2020-08-25 ENCOUNTER — APPOINTMENT (OUTPATIENT)
Dept: LAB | Facility: HOSPITAL | Age: 20
End: 2020-08-25
Payer: COMMERCIAL

## 2020-08-25 LAB
ALBUMIN SERPL BCP-MCNC: 4.1 G/DL (ref 3–5.2)
ALP SERPL-CCNC: 68 U/L (ref 43–122)
ALT SERPL W P-5'-P-CCNC: 33 U/L (ref 9–52)
ANION GAP SERPL CALCULATED.3IONS-SCNC: 8 MMOL/L (ref 5–14)
AST SERPL W P-5'-P-CCNC: 29 U/L (ref 14–36)
BASOPHILS # BLD AUTO: 0.2 THOUSANDS/ΜL (ref 0–0.1)
BASOPHILS NFR BLD AUTO: 2 % (ref 0–1)
BILIRUB SERPL-MCNC: 1 MG/DL
BUN SERPL-MCNC: 14 MG/DL (ref 5–25)
CALCIUM SERPL-MCNC: 9.5 MG/DL (ref 8.4–10.2)
CHLORIDE SERPL-SCNC: 102 MMOL/L (ref 97–108)
CHOLEST SERPL-MCNC: 163 MG/DL
CO2 SERPL-SCNC: 27 MMOL/L (ref 22–30)
CREAT SERPL-MCNC: 0.81 MG/DL (ref 0.6–1.2)
EOSINOPHIL # BLD AUTO: 0.6 THOUSAND/ΜL (ref 0–0.4)
EOSINOPHIL NFR BLD AUTO: 7 % (ref 0–6)
ERYTHROCYTE [DISTWIDTH] IN BLOOD BY AUTOMATED COUNT: 13.2 %
FERRITIN SERPL-MCNC: 41 NG/ML (ref 8–388)
GFR SERPL CREATININE-BSD FRML MDRD: 104 ML/MIN/1.73SQ M
GLUCOSE P FAST SERPL-MCNC: 109 MG/DL (ref 70–99)
HCT VFR BLD AUTO: 41.7 % (ref 36–46)
HDLC SERPL-MCNC: 38 MG/DL
HGB BLD-MCNC: 13.8 G/DL (ref 12–16)
IRON SATN MFR SERPL: 36 %
IRON SERPL-MCNC: 132 UG/DL (ref 50–170)
LDLC SERPL CALC-MCNC: 99 MG/DL
LYMPHOCYTES # BLD AUTO: 1.8 THOUSANDS/ΜL (ref 0.5–4)
LYMPHOCYTES NFR BLD AUTO: 21 % (ref 25–45)
MCH RBC QN AUTO: 28.6 PG (ref 26–34)
MCHC RBC AUTO-ENTMCNC: 33 G/DL (ref 31–36)
MCV RBC AUTO: 87 FL (ref 80–100)
MONOCYTES # BLD AUTO: 0.6 THOUSAND/ΜL (ref 0.2–0.9)
MONOCYTES NFR BLD AUTO: 7 % (ref 1–10)
NEUTROPHILS # BLD AUTO: 5.6 THOUSANDS/ΜL (ref 1.8–7.8)
NEUTS SEG NFR BLD AUTO: 63 % (ref 45–65)
NONHDLC SERPL-MCNC: 125 MG/DL
PLATELET # BLD AUTO: 408 THOUSANDS/UL (ref 150–450)
PMV BLD AUTO: 8.1 FL (ref 8.9–12.7)
POTASSIUM SERPL-SCNC: 4 MMOL/L (ref 3.6–5)
PROT SERPL-MCNC: 8.5 G/DL (ref 5.9–8.4)
RBC # BLD AUTO: 4.82 MILLION/UL (ref 4–5.2)
SODIUM SERPL-SCNC: 137 MMOL/L (ref 137–147)
TIBC SERPL-MCNC: 365 UG/DL (ref 250–450)
TRIGL SERPL-MCNC: 128 MG/DL
TSH SERPL DL<=0.05 MIU/L-ACNC: 0.91 UIU/ML (ref 0.47–4.68)
WBC # BLD AUTO: 8.8 THOUSAND/UL (ref 4.5–11)

## 2020-08-25 PROCEDURE — 85025 COMPLETE CBC W/AUTO DIFF WBC: CPT | Performed by: INTERNAL MEDICINE

## 2020-08-25 PROCEDURE — 83540 ASSAY OF IRON: CPT | Performed by: INTERNAL MEDICINE

## 2020-08-25 PROCEDURE — 36415 COLL VENOUS BLD VENIPUNCTURE: CPT | Performed by: INTERNAL MEDICINE

## 2020-08-25 PROCEDURE — 80061 LIPID PANEL: CPT | Performed by: INTERNAL MEDICINE

## 2020-08-25 PROCEDURE — 82728 ASSAY OF FERRITIN: CPT | Performed by: INTERNAL MEDICINE

## 2020-08-25 PROCEDURE — 83550 IRON BINDING TEST: CPT | Performed by: INTERNAL MEDICINE

## 2020-08-25 PROCEDURE — 80053 COMPREHEN METABOLIC PANEL: CPT | Performed by: INTERNAL MEDICINE

## 2020-08-25 PROCEDURE — 84443 ASSAY THYROID STIM HORMONE: CPT | Performed by: INTERNAL MEDICINE

## 2020-08-31 ENCOUNTER — OFFICE VISIT (OUTPATIENT)
Dept: FAMILY MEDICINE CLINIC | Facility: CLINIC | Age: 20
End: 2020-08-31
Payer: COMMERCIAL

## 2020-08-31 VITALS
SYSTOLIC BLOOD PRESSURE: 132 MMHG | HEART RATE: 95 BPM | WEIGHT: 231 LBS | DIASTOLIC BLOOD PRESSURE: 90 MMHG | HEIGHT: 65 IN | TEMPERATURE: 98.2 F | OXYGEN SATURATION: 99 % | BODY MASS INDEX: 38.49 KG/M2

## 2020-08-31 DIAGNOSIS — R73.9 HYPERGLYCEMIA: ICD-10-CM

## 2020-08-31 DIAGNOSIS — K52.9 IBD (INFLAMMATORY BOWEL DISEASE): Primary | ICD-10-CM

## 2020-08-31 PROCEDURE — 99213 OFFICE O/P EST LOW 20 MIN: CPT | Performed by: INTERNAL MEDICINE

## 2020-08-31 PROCEDURE — 3008F BODY MASS INDEX DOCD: CPT | Performed by: INTERNAL MEDICINE

## 2020-08-31 PROCEDURE — 4004F PT TOBACCO SCREEN RCVD TLK: CPT | Performed by: INTERNAL MEDICINE

## 2020-08-31 PROCEDURE — 3008F BODY MASS INDEX DOCD: CPT | Performed by: NURSE PRACTITIONER

## 2020-08-31 NOTE — PROGRESS NOTES
Assessment/Plan:         Diagnoses and all orders for this visit:    IBD (inflammatory bowel disease)  As per GI  Hemoglobin has improved she can start iron supplements  All see her back in a  Hyperglycemia  Diet modification  Repeat lab studies in 4-6 months  Other orders  -     Multiple Vitamins-Minerals (HAIR SKIN AND NAILS FORMULA PO); Take by mouth        Subjective:      Patient ID: Kyle Carias is a 22 y o  female  HPI  Patient history of Crohn's disease high data suppurative a iron deficiency anemia is here to follow-up on chronic medical problems  Her last hemoglobin was normal   She can stop taking iron supplements  She was started on Stelara IL-12-23 antagonist recently and still has ongoing abdominal pain nausea poor p o  intake  Encouraged her to discuss this further with her GI  Her most recent labs showed mild hyperglycemia  Patient encouraged to continue with diet modification  Recently had fall hurting her right knee  ER visit was reviewed  X-rays negative  The following portions of the patient's history were reviewed and updated as appropriate: allergies, current medications, past family history, past medical history, past social history, past surgical history and problem list     Review of Systems   Constitutional: Negative for chills and fever  Gastrointestinal:        As above   Musculoskeletal:        As above   Skin:        Much improved outbreak with hidradenitis   Hematological:        As above         Objective:      /90 (BP Location: Left arm, Patient Position: Sitting, Cuff Size: Large)   Pulse 95   Temp 98 2 °F (36 8 °C)   Ht 5' 5" (1 651 m)   Wt 105 kg (231 lb)   SpO2 99%   BMI 38 44 kg/m²          Physical Exam  Constitutional:       Appearance: She is not ill-appearing or diaphoretic  Pulmonary:      Effort: Pulmonary effort is normal  No respiratory distress  Breath sounds: Normal breath sounds  No wheezing or rales     Abdominal:      General: Bowel sounds are normal  There is no distension  Palpations: Abdomen is soft  Tenderness: There is no abdominal tenderness  There is no right CVA tenderness or guarding  Skin:     Coloration: Skin is not pale

## 2020-09-18 DIAGNOSIS — E61.1 IRON DEFICIENCY: ICD-10-CM

## 2020-09-18 RX ORDER — ASCORBIC ACID 500 MG
TABLET ORAL
Qty: 30 TABLET | Refills: 1 | Status: SHIPPED | OUTPATIENT
Start: 2020-09-18 | End: 2020-12-21

## 2020-09-25 ENCOUNTER — APPOINTMENT (OUTPATIENT)
Dept: LAB | Facility: HOSPITAL | Age: 20
End: 2020-09-25
Payer: COMMERCIAL

## 2020-09-25 ENCOUNTER — OFFICE VISIT (OUTPATIENT)
Dept: FAMILY MEDICINE CLINIC | Facility: CLINIC | Age: 20
End: 2020-09-25
Payer: COMMERCIAL

## 2020-09-25 ENCOUNTER — TELEPHONE (OUTPATIENT)
Dept: FAMILY MEDICINE CLINIC | Facility: CLINIC | Age: 20
End: 2020-09-25

## 2020-09-25 VITALS
HEART RATE: 94 BPM | DIASTOLIC BLOOD PRESSURE: 82 MMHG | WEIGHT: 230 LBS | TEMPERATURE: 98.2 F | OXYGEN SATURATION: 98 % | SYSTOLIC BLOOD PRESSURE: 124 MMHG | BODY MASS INDEX: 38.32 KG/M2 | HEIGHT: 65 IN

## 2020-09-25 DIAGNOSIS — N12 PYELONEPHRITIS: ICD-10-CM

## 2020-09-25 DIAGNOSIS — R35.0 FREQUENCY OF URINATION: Primary | ICD-10-CM

## 2020-09-25 DIAGNOSIS — N30.00 ACUTE CYSTITIS WITHOUT HEMATURIA: ICD-10-CM

## 2020-09-25 DIAGNOSIS — R10.84 GENERALIZED ABDOMINAL PAIN: ICD-10-CM

## 2020-09-25 LAB
ALBUMIN SERPL BCP-MCNC: 3.8 G/DL (ref 3–5.2)
ALP SERPL-CCNC: 60 U/L (ref 43–122)
ALT SERPL W P-5'-P-CCNC: 30 U/L (ref 9–52)
ANION GAP SERPL CALCULATED.3IONS-SCNC: 6 MMOL/L (ref 5–14)
AST SERPL W P-5'-P-CCNC: 26 U/L (ref 14–36)
BASOPHILS # BLD AUTO: 0.1 THOUSANDS/ΜL (ref 0–0.1)
BASOPHILS NFR BLD AUTO: 1 % (ref 0–1)
BILIRUB SERPL-MCNC: 0.5 MG/DL
BUN SERPL-MCNC: 14 MG/DL (ref 5–25)
CALCIUM SERPL-MCNC: 9.3 MG/DL (ref 8.4–10.2)
CHLORIDE SERPL-SCNC: 103 MMOL/L (ref 97–108)
CO2 SERPL-SCNC: 28 MMOL/L (ref 22–30)
CREAT SERPL-MCNC: 0.75 MG/DL (ref 0.6–1.2)
EOSINOPHIL # BLD AUTO: 0.4 THOUSAND/ΜL (ref 0–0.4)
EOSINOPHIL NFR BLD AUTO: 5 % (ref 0–6)
ERYTHROCYTE [DISTWIDTH] IN BLOOD BY AUTOMATED COUNT: 13.6 %
GFR SERPL CREATININE-BSD FRML MDRD: 114 ML/MIN/1.73SQ M
GLUCOSE P FAST SERPL-MCNC: 93 MG/DL (ref 70–99)
HCT VFR BLD AUTO: 37.3 % (ref 36–46)
HGB BLD-MCNC: 12.4 G/DL (ref 12–16)
LYMPHOCYTES # BLD AUTO: 1.4 THOUSANDS/ΜL (ref 0.5–4)
LYMPHOCYTES NFR BLD AUTO: 16 % (ref 25–45)
MCH RBC QN AUTO: 29 PG (ref 26–34)
MCHC RBC AUTO-ENTMCNC: 33.3 G/DL (ref 31–36)
MCV RBC AUTO: 87 FL (ref 80–100)
MONOCYTES # BLD AUTO: 0.6 THOUSAND/ΜL (ref 0.2–0.9)
MONOCYTES NFR BLD AUTO: 7 % (ref 1–10)
NEUTROPHILS # BLD AUTO: 6.4 THOUSANDS/ΜL (ref 1.8–7.8)
NEUTS SEG NFR BLD AUTO: 71 % (ref 45–65)
PLATELET # BLD AUTO: 361 THOUSANDS/UL (ref 150–450)
PMV BLD AUTO: 8.1 FL (ref 8.9–12.7)
POTASSIUM SERPL-SCNC: 4.2 MMOL/L (ref 3.6–5)
PROT SERPL-MCNC: 7.8 G/DL (ref 5.9–8.4)
RBC # BLD AUTO: 4.28 MILLION/UL (ref 4–5.2)
SL AMB  POCT GLUCOSE, UA: ABNORMAL
SL AMB LEUKOCYTE ESTERASE,UA: ABNORMAL
SL AMB POCT BILIRUBIN,UA: ABNORMAL
SL AMB POCT BLOOD,UA: ABNORMAL
SL AMB POCT CLARITY,UA: CLEAR
SL AMB POCT COLOR,UA: YELLOW
SL AMB POCT KETONES,UA: ABNORMAL
SL AMB POCT NITRITE,UA: ABNORMAL
SL AMB POCT PH,UA: 6.5
SL AMB POCT SPECIFIC GRAVITY,UA: 1.01
SL AMB POCT URINE PROTEIN: ABNORMAL
SL AMB POCT UROBILINOGEN: ABNORMAL
SODIUM SERPL-SCNC: 137 MMOL/L (ref 137–147)
WBC # BLD AUTO: 8.9 THOUSAND/UL (ref 4.5–11)

## 2020-09-25 PROCEDURE — 81002 URINALYSIS NONAUTO W/O SCOPE: CPT | Performed by: INTERNAL MEDICINE

## 2020-09-25 PROCEDURE — 80053 COMPREHEN METABOLIC PANEL: CPT | Performed by: INTERNAL MEDICINE

## 2020-09-25 PROCEDURE — 36415 COLL VENOUS BLD VENIPUNCTURE: CPT | Performed by: INTERNAL MEDICINE

## 2020-09-25 PROCEDURE — 99213 OFFICE O/P EST LOW 20 MIN: CPT | Performed by: INTERNAL MEDICINE

## 2020-09-25 PROCEDURE — 87086 URINE CULTURE/COLONY COUNT: CPT | Performed by: INTERNAL MEDICINE

## 2020-09-25 PROCEDURE — 85025 COMPLETE CBC W/AUTO DIFF WBC: CPT | Performed by: INTERNAL MEDICINE

## 2020-09-25 PROCEDURE — 4004F PT TOBACCO SCREEN RCVD TLK: CPT | Performed by: INTERNAL MEDICINE

## 2020-09-25 RX ORDER — CIPROFLOXACIN 500 MG/1
500 TABLET, FILM COATED ORAL EVERY 12 HOURS SCHEDULED
Qty: 6 TABLET | Refills: 0 | Status: SHIPPED | OUTPATIENT
Start: 2020-09-25 | End: 2020-09-28

## 2020-09-25 NOTE — PROGRESS NOTES
Assessment/Plan:         Diagnoses and all orders for this visit:    Frequency of urination  -     POCT urine dip  -     Urine culture; Future  -     Urine culture  Symptoms suggestive of UTI  Starting antibiotic would follow-up on cultures on Monday  Increase fluid intake and finish the antibiotic  Call with worsening symptoms: fever chills nausea,vomiting, dysuria, back pain, hematuria  Generalized abdominal pain  -     POCT urine dip  -     Urine culture; Future  -     Urine culture    Acute cystitis without hematuria            Subjective:      Patient ID: Jose L Kerns is a 22 y o  female  HPI  Patient is here for an acute visit complaining of dysuria urinary frequency urgency the last 2 days  No fevers or chills or back pain  Did have some nausea yesterday  Denies any hematuria  Minimally decreased urinary stream   She is not pregnant  Urine dip in the office was positive for hematuria  The following portions of the patient's history were reviewed and updated as appropriate: allergies, current medications, past family history, past medical history, past social history, past surgical history and problem list     Review of Systems      Objective:      /82 (BP Location: Left arm, Patient Position: Sitting, Cuff Size: Standard)   Pulse 94   Temp 98 2 °F (36 8 °C)   Ht 5' 5" (1 651 m)   Wt 104 kg (230 lb)   SpO2 98%   BMI 38 27 kg/m²          Physical Exam  Constitutional:       General: She is not in acute distress  Appearance: She is not ill-appearing  Abdominal:      Tenderness: There is abdominal tenderness in the suprapubic area  There is no right CVA tenderness or left CVA tenderness  Neurological:      Mental Status: She is alert

## 2020-09-26 LAB — BACTERIA UR CULT: NORMAL

## 2020-10-07 ENCOUNTER — TELEPHONE (OUTPATIENT)
Dept: OBGYN CLINIC | Facility: CLINIC | Age: 20
End: 2020-10-07

## 2020-10-12 ENCOUNTER — TELEPHONE (OUTPATIENT)
Dept: OBGYN CLINIC | Facility: CLINIC | Age: 20
End: 2020-10-12

## 2020-10-22 ENCOUNTER — HOSPITAL ENCOUNTER (EMERGENCY)
Facility: HOSPITAL | Age: 20
Discharge: HOME | End: 2020-10-22
Attending: EMERGENCY MEDICINE
Payer: COMMERCIAL

## 2020-10-22 VITALS
RESPIRATION RATE: 18 BRPM | HEART RATE: 67 BPM | TEMPERATURE: 98.4 F | DIASTOLIC BLOOD PRESSURE: 94 MMHG | SYSTOLIC BLOOD PRESSURE: 146 MMHG | OXYGEN SATURATION: 96 %

## 2020-10-22 DIAGNOSIS — M54.9 BACK PAIN: Primary | ICD-10-CM

## 2020-10-22 PROCEDURE — 99282 EMERGENCY DEPT VISIT SF MDM: CPT | Performed by: EMERGENCY MEDICINE

## 2020-10-22 PROCEDURE — 99283 EMERGENCY DEPT VISIT LOW MDM: CPT

## 2020-10-22 RX ORDER — LIDOCAINE 50 MG/G
1 PATCH TOPICAL ONCE
Status: DISCONTINUED | OUTPATIENT
Start: 2020-10-22 | End: 2020-10-23 | Stop reason: HOSPADM

## 2020-10-22 RX ADMIN — LIDOCAINE 1 PATCH: 50 PATCH TOPICAL at 22:10

## 2020-10-26 DIAGNOSIS — Z30.011 ENCOUNTER FOR INITIAL PRESCRIPTION OF CONTRACEPTIVE PILLS: ICD-10-CM

## 2020-11-09 ENCOUNTER — ANNUAL EXAM (OUTPATIENT)
Dept: OBGYN CLINIC | Facility: CLINIC | Age: 20
End: 2020-11-09
Payer: COMMERCIAL

## 2020-11-09 VITALS
BODY MASS INDEX: 38.32 KG/M2 | TEMPERATURE: 97.2 F | HEIGHT: 65 IN | DIASTOLIC BLOOD PRESSURE: 70 MMHG | SYSTOLIC BLOOD PRESSURE: 110 MMHG | WEIGHT: 230 LBS | HEART RATE: 99 BPM

## 2020-11-09 DIAGNOSIS — Z30.41 SURVEILLANCE FOR BIRTH CONTROL, ORAL CONTRACEPTIVES: ICD-10-CM

## 2020-11-09 DIAGNOSIS — Z01.419 ENCOUNTER FOR GYNECOLOGICAL EXAMINATION WITHOUT ABNORMAL FINDING: Primary | ICD-10-CM

## 2020-11-09 DIAGNOSIS — Z12.4 ENCOUNTER FOR PAPANICOLAOU SMEAR FOR CERVICAL CANCER SCREENING: ICD-10-CM

## 2020-11-09 PROCEDURE — G0145 SCR C/V CYTO,THINLAYER,RESCR: HCPCS | Performed by: NURSE PRACTITIONER

## 2020-11-09 PROCEDURE — 3008F BODY MASS INDEX DOCD: CPT | Performed by: NURSE PRACTITIONER

## 2020-11-09 PROCEDURE — 99395 PREV VISIT EST AGE 18-39: CPT | Performed by: NURSE PRACTITIONER

## 2020-11-09 PROCEDURE — 4004F PT TOBACCO SCREEN RCVD TLK: CPT | Performed by: NURSE PRACTITIONER

## 2020-11-09 RX ORDER — NORGESTIMATE AND ETHINYL ESTRADIOL 0.25-0.035
1 KIT ORAL DAILY
Qty: 28 TABLET | Refills: 13 | Status: SHIPPED | OUTPATIENT
Start: 2020-11-09 | End: 2021-11-24 | Stop reason: ALTCHOICE

## 2020-11-09 RX ORDER — CLINDAMYCIN PHOSPHATE 10 MG/ML
SOLUTION TOPICAL
COMMUNITY
Start: 2020-11-02 | End: 2021-11-24 | Stop reason: ALTCHOICE

## 2020-11-13 LAB
LAB AP GYN PRIMARY INTERPRETATION: NORMAL
Lab: NORMAL

## 2020-12-01 ENCOUNTER — TELEPHONE (OUTPATIENT)
Dept: FAMILY MEDICINE CLINIC | Facility: CLINIC | Age: 20
End: 2020-12-01

## 2020-12-01 ENCOUNTER — OFFICE VISIT (OUTPATIENT)
Dept: FAMILY MEDICINE CLINIC | Facility: CLINIC | Age: 20
End: 2020-12-01
Payer: COMMERCIAL

## 2020-12-01 VITALS
TEMPERATURE: 99.8 F | HEART RATE: 116 BPM | SYSTOLIC BLOOD PRESSURE: 114 MMHG | BODY MASS INDEX: 37.65 KG/M2 | OXYGEN SATURATION: 98 % | WEIGHT: 226 LBS | DIASTOLIC BLOOD PRESSURE: 72 MMHG | HEIGHT: 65 IN

## 2020-12-01 DIAGNOSIS — R35.0 URINARY FREQUENCY: Primary | ICD-10-CM

## 2020-12-01 DIAGNOSIS — N39.0 RECURRENT UTI: ICD-10-CM

## 2020-12-01 DIAGNOSIS — N30.00 ACUTE CYSTITIS WITHOUT HEMATURIA: ICD-10-CM

## 2020-12-01 LAB
SL AMB  POCT GLUCOSE, UA: ABNORMAL
SL AMB LEUKOCYTE ESTERASE,UA: ABNORMAL
SL AMB POCT BILIRUBIN,UA: ABNORMAL
SL AMB POCT BLOOD,UA: ABNORMAL
SL AMB POCT CLARITY,UA: CLEAR
SL AMB POCT COLOR,UA: YELLOW
SL AMB POCT KETONES,UA: ABNORMAL
SL AMB POCT NITRITE,UA: ABNORMAL
SL AMB POCT PH,UA: ABNORMAL
SL AMB POCT SPECIFIC GRAVITY,UA: 1.03
SL AMB POCT URINE PROTEIN: 30
SL AMB POCT UROBILINOGEN: ABNORMAL

## 2020-12-01 PROCEDURE — 87086 URINE CULTURE/COLONY COUNT: CPT | Performed by: INTERNAL MEDICINE

## 2020-12-01 PROCEDURE — 3008F BODY MASS INDEX DOCD: CPT | Performed by: INTERNAL MEDICINE

## 2020-12-01 PROCEDURE — 81002 URINALYSIS NONAUTO W/O SCOPE: CPT | Performed by: INTERNAL MEDICINE

## 2020-12-01 PROCEDURE — 99213 OFFICE O/P EST LOW 20 MIN: CPT | Performed by: INTERNAL MEDICINE

## 2020-12-01 RX ORDER — NITROFURANTOIN 25; 75 MG/1; MG/1
100 CAPSULE ORAL 2 TIMES DAILY
Qty: 10 CAPSULE | Refills: 0 | Status: SHIPPED | OUTPATIENT
Start: 2020-12-01 | End: 2020-12-06

## 2020-12-03 LAB — BACTERIA UR CULT: NORMAL

## 2020-12-07 ENCOUNTER — TELEPHONE (OUTPATIENT)
Dept: FAMILY MEDICINE CLINIC | Facility: CLINIC | Age: 20
End: 2020-12-07

## 2020-12-15 ENCOUNTER — TELEPHONE (OUTPATIENT)
Dept: FAMILY MEDICINE CLINIC | Facility: CLINIC | Age: 20
End: 2020-12-15

## 2020-12-21 DIAGNOSIS — E61.1 IRON DEFICIENCY: ICD-10-CM

## 2020-12-21 RX ORDER — ASCORBIC ACID 500 MG
TABLET ORAL
Qty: 30 TABLET | Refills: 1 | Status: SHIPPED | OUTPATIENT
Start: 2020-12-21 | End: 2021-02-15

## 2021-01-05 ENCOUNTER — OFFICE VISIT (OUTPATIENT)
Dept: FAMILY MEDICINE CLINIC | Facility: CLINIC | Age: 21
End: 2021-01-05
Payer: COMMERCIAL

## 2021-01-05 ENCOUNTER — TELEPHONE (OUTPATIENT)
Dept: FAMILY MEDICINE CLINIC | Facility: CLINIC | Age: 21
End: 2021-01-05

## 2021-01-05 VITALS
HEART RATE: 80 BPM | HEIGHT: 65 IN | TEMPERATURE: 98.9 F | WEIGHT: 229 LBS | SYSTOLIC BLOOD PRESSURE: 124 MMHG | DIASTOLIC BLOOD PRESSURE: 80 MMHG | BODY MASS INDEX: 38.15 KG/M2 | OXYGEN SATURATION: 99 %

## 2021-01-05 DIAGNOSIS — F32.A DEPRESSION, UNSPECIFIED DEPRESSION TYPE: ICD-10-CM

## 2021-01-05 DIAGNOSIS — R10.13 DYSPEPSIA: ICD-10-CM

## 2021-01-05 DIAGNOSIS — Z86.39 HISTORY OF IRON DEFICIENCY: ICD-10-CM

## 2021-01-05 DIAGNOSIS — R53.83 OTHER FATIGUE: ICD-10-CM

## 2021-01-05 DIAGNOSIS — K50.119 CROHN'S DISEASE OF COLON WITH COMPLICATION (HCC): Primary | ICD-10-CM

## 2021-01-05 PROCEDURE — 99214 OFFICE O/P EST MOD 30 MIN: CPT | Performed by: INTERNAL MEDICINE

## 2021-01-05 RX ORDER — FAMOTIDINE 20 MG/1
20 TABLET, FILM COATED ORAL DAILY
Qty: 30 TABLET | Refills: 1 | Status: SHIPPED | OUTPATIENT
Start: 2021-01-05 | End: 2021-03-01

## 2021-01-05 RX ORDER — SERTRALINE HYDROCHLORIDE 25 MG/1
25 TABLET, FILM COATED ORAL DAILY
Qty: 30 TABLET | Refills: 5 | Status: SHIPPED | OUTPATIENT
Start: 2021-01-05 | End: 2021-11-24 | Stop reason: ALTCHOICE

## 2021-01-05 NOTE — PROGRESS NOTES
Assessment/Plan:           Diagnoses and all orders for this visit:    Crohn's disease of colon with complication ([facility] )  -     CBC and differential  -     Vitamin D 25 hydroxy  Follow-up with GI  Other fatigue  -     TSH, 3rd generation with Free T4 reflex  -     Comprehensive metabolic panel  -     Iron Panel (Includes Ferritin, Iron Sat%, Iron, and TIBC)  -     Vitamin D 25 hydroxy  Likely multifactorial   Lab studies ordered  History of iron deficiency  -     Iron Panel (Includes Ferritin, Iron Sat%, Iron, and TIBC)  -     Vitamin D 25 hydroxy    Dyspepsia  -     H  pylori antigen, stool; Future  -     famotidine (PEPCID) 20 mg tablet; Take 1 tablet (20 mg total) by mouth daily    Depression, unspecified depression type  -     sertraline (ZOLOFT) 25 mg tablet; Take 1 tablet (25 mg total) by mouth daily    side effects discussed with patient  Will follow up with the patient in a month    Subjective:      Patient ID: Maritza Peacock is a 23 y o  female  HPI  Patient reports excessive anxiety, depression, difficulty with concentration, decreased energy, trouble falling asleep, excessive worrying, decreased energy and decreased interest in pleasure  Patient denies excessive energy hallucination pressure speech flight of ideas lack of appetite hallucination suicidal thoughts  This is been going on for several months now  Her Crohn's has been flaring up which is not helping her situation  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          Is very close to her sister and her boyfriend  Is negative family history of mood or psychotic disorder  Patient does not report any narcotic use  She does not smoke or consume excessive alcohol  Also complains of feeling tired  She is known to have iron deficiency in the past   She reports seeing small amount of blood in the stool again  Encouraged her to follow-up with a gastroenterologist   Iron studies will be ordered    I will check her thyroid again as well  The following portions of the patient's history were reviewed and updated as appropriate: allergies, current medications, past family history, past medical history, past social history, past surgical history and problem list     Review of Systems   Constitutional: Positive for fatigue  Negative for chills, fever and unexpected weight change  HENT: Negative for congestion, postnasal drip, sinus pressure, sinus pain and sore throat  Gastrointestinal:        As above   Neurological: Positive for headaches  Negative for dizziness  Psychiatric/Behavioral:        As above         Objective:      /80 (BP Location: Left arm, Patient Position: Sitting, Cuff Size: Extra-Large)   Pulse 80   Temp 98 9 °F (37 2 °C)   Ht 5' 5" (1 651 m)   Wt 104 kg (229 lb)   SpO2 99%   BMI 38 11 kg/m²          Physical Exam  Constitutional:       Appearance: Normal appearance  Cardiovascular:      Rate and Rhythm: Normal rate and regular rhythm  Heart sounds: Normal heart sounds  Pulmonary:      Effort: Pulmonary effort is normal  No respiratory distress  Abdominal:      Tenderness: There is abdominal tenderness (Diffuse tenderness)  Musculoskeletal:      Right lower leg: No edema  Left lower leg: No edema  Skin:     Coloration: Skin is not pale  Neurological:      Mental Status: She is alert  Psychiatric:         Attention and Perception: Attention normal          Mood and Affect: Mood normal          Speech: Speech normal          Behavior: Behavior normal          Thought Content: Thought content normal          Judgment: Judgment is not impulsive  Depression Screening and Follow-up Plan: Patient assessed for underlying major depression  Brief counseling provided and recommend additional follow-up/re-evaluation next office visit   Start the medication

## 2021-01-06 ENCOUNTER — LAB (OUTPATIENT)
Dept: LAB | Facility: HOSPITAL | Age: 21
End: 2021-01-06
Payer: COMMERCIAL

## 2021-01-06 DIAGNOSIS — R10.13 DYSPEPSIA: ICD-10-CM

## 2021-01-06 LAB
25(OH)D3 SERPL-MCNC: 15.3 NG/ML (ref 30–100)
ALBUMIN SERPL BCP-MCNC: 4.2 G/DL (ref 3–5.2)
ALP SERPL-CCNC: 69 U/L (ref 43–122)
ALT SERPL W P-5'-P-CCNC: 50 U/L (ref 9–52)
ANION GAP SERPL CALCULATED.3IONS-SCNC: 11 MMOL/L (ref 5–14)
AST SERPL W P-5'-P-CCNC: 32 U/L (ref 14–36)
BASOPHILS # BLD AUTO: 0.1 THOUSANDS/ΜL (ref 0–0.1)
BASOPHILS NFR BLD AUTO: 1 % (ref 0–1)
BILIRUB SERPL-MCNC: 0.7 MG/DL
BUN SERPL-MCNC: 11 MG/DL (ref 5–25)
CALCIUM SERPL-MCNC: 9.9 MG/DL (ref 8.4–10.2)
CHLORIDE SERPL-SCNC: 103 MMOL/L (ref 97–108)
CO2 SERPL-SCNC: 26 MMOL/L (ref 22–30)
CREAT SERPL-MCNC: 0.75 MG/DL (ref 0.6–1.2)
EOSINOPHIL # BLD AUTO: 0.6 THOUSAND/ΜL (ref 0–0.4)
EOSINOPHIL NFR BLD AUTO: 6 % (ref 0–6)
ERYTHROCYTE [DISTWIDTH] IN BLOOD BY AUTOMATED COUNT: 13.6 %
FERRITIN SERPL-MCNC: 26 NG/ML (ref 8–388)
GFR SERPL CREATININE-BSD FRML MDRD: 114 ML/MIN/1.73SQ M
GLUCOSE P FAST SERPL-MCNC: 92 MG/DL (ref 70–99)
HCT VFR BLD AUTO: 41.7 % (ref 36–46)
HGB BLD-MCNC: 13.6 G/DL (ref 12–16)
IRON SATN MFR SERPL: 14 %
IRON SERPL-MCNC: 59 UG/DL (ref 50–170)
LYMPHOCYTES # BLD AUTO: 1.1 THOUSANDS/ΜL (ref 0.5–4)
LYMPHOCYTES NFR BLD AUTO: 10 % (ref 25–45)
MCH RBC QN AUTO: 29.5 PG (ref 26–34)
MCHC RBC AUTO-ENTMCNC: 32.7 G/DL (ref 31–36)
MCV RBC AUTO: 90 FL (ref 80–100)
MONOCYTES # BLD AUTO: 0.7 THOUSAND/ΜL (ref 0.2–0.9)
MONOCYTES NFR BLD AUTO: 6 % (ref 1–10)
NEUTROPHILS # BLD AUTO: 8.8 THOUSANDS/ΜL (ref 1.8–7.8)
NEUTS SEG NFR BLD AUTO: 78 % (ref 45–65)
PLATELET # BLD AUTO: 377 THOUSANDS/UL (ref 150–450)
PMV BLD AUTO: 8.4 FL (ref 8.9–12.7)
POTASSIUM SERPL-SCNC: 4.8 MMOL/L (ref 3.6–5)
PROT SERPL-MCNC: 8.3 G/DL (ref 5.9–8.4)
RBC # BLD AUTO: 4.62 MILLION/UL (ref 4–5.2)
SODIUM SERPL-SCNC: 140 MMOL/L (ref 137–147)
TIBC SERPL-MCNC: 421 UG/DL (ref 250–450)
TSH SERPL DL<=0.05 MIU/L-ACNC: 1.11 UIU/ML (ref 0.47–4.68)
WBC # BLD AUTO: 11.3 THOUSAND/UL (ref 4.5–11)

## 2021-01-06 PROCEDURE — 84443 ASSAY THYROID STIM HORMONE: CPT | Performed by: INTERNAL MEDICINE

## 2021-01-06 PROCEDURE — 80053 COMPREHEN METABOLIC PANEL: CPT | Performed by: INTERNAL MEDICINE

## 2021-01-06 PROCEDURE — 83550 IRON BINDING TEST: CPT | Performed by: INTERNAL MEDICINE

## 2021-01-06 PROCEDURE — 36415 COLL VENOUS BLD VENIPUNCTURE: CPT | Performed by: INTERNAL MEDICINE

## 2021-01-06 PROCEDURE — 85025 COMPLETE CBC W/AUTO DIFF WBC: CPT | Performed by: INTERNAL MEDICINE

## 2021-01-06 PROCEDURE — 83540 ASSAY OF IRON: CPT | Performed by: INTERNAL MEDICINE

## 2021-01-06 PROCEDURE — 82728 ASSAY OF FERRITIN: CPT | Performed by: INTERNAL MEDICINE

## 2021-01-06 PROCEDURE — 82306 VITAMIN D 25 HYDROXY: CPT | Performed by: INTERNAL MEDICINE

## 2021-01-07 ENCOUNTER — TRANSCRIBE ORDERS (OUTPATIENT)
Dept: LAB | Facility: HOSPITAL | Age: 21
End: 2021-01-07

## 2021-01-07 ENCOUNTER — APPOINTMENT (OUTPATIENT)
Dept: LAB | Facility: HOSPITAL | Age: 21
End: 2021-01-07
Payer: COMMERCIAL

## 2021-01-07 DIAGNOSIS — E55.9 VITAMIN D DEFICIENCY: Primary | ICD-10-CM

## 2021-01-07 PROCEDURE — 87338 HPYLORI STOOL AG IA: CPT

## 2021-01-07 RX ORDER — ERGOCALCIFEROL 1.25 MG/1
50000 CAPSULE ORAL WEEKLY
Qty: 8 CAPSULE | Refills: 0 | Status: SHIPPED | OUTPATIENT
Start: 2021-01-07 | End: 2021-01-21 | Stop reason: SDUPTHER

## 2021-01-08 LAB — H PYLORI AG STL QL IA: NEGATIVE

## 2021-01-21 ENCOUNTER — OFFICE VISIT (OUTPATIENT)
Dept: FAMILY MEDICINE CLINIC | Facility: CLINIC | Age: 21
End: 2021-01-21
Payer: COMMERCIAL

## 2021-01-21 VITALS
DIASTOLIC BLOOD PRESSURE: 80 MMHG | TEMPERATURE: 97.1 F | OXYGEN SATURATION: 98 % | BODY MASS INDEX: 37.4 KG/M2 | SYSTOLIC BLOOD PRESSURE: 120 MMHG | HEIGHT: 65 IN | RESPIRATION RATE: 16 BRPM | WEIGHT: 224.5 LBS | HEART RATE: 90 BPM

## 2021-01-21 DIAGNOSIS — F32.A DEPRESSION, UNSPECIFIED DEPRESSION TYPE: ICD-10-CM

## 2021-01-21 DIAGNOSIS — E55.9 VITAMIN D DEFICIENCY: ICD-10-CM

## 2021-01-21 DIAGNOSIS — R10.13 DYSPEPSIA: Primary | ICD-10-CM

## 2021-01-21 PROCEDURE — 3008F BODY MASS INDEX DOCD: CPT | Performed by: INTERNAL MEDICINE

## 2021-01-21 PROCEDURE — 99214 OFFICE O/P EST MOD 30 MIN: CPT | Performed by: INTERNAL MEDICINE

## 2021-01-21 PROCEDURE — 4004F PT TOBACCO SCREEN RCVD TLK: CPT | Performed by: INTERNAL MEDICINE

## 2021-01-21 RX ORDER — ERGOCALCIFEROL 1.25 MG/1
50000 CAPSULE ORAL WEEKLY
Qty: 8 CAPSULE | Refills: 0 | Status: SHIPPED | OUTPATIENT
Start: 2021-01-21 | End: 2021-02-26 | Stop reason: SDUPTHER

## 2021-01-21 RX ORDER — PANTOPRAZOLE SODIUM 40 MG/1
40 TABLET, DELAYED RELEASE ORAL
Qty: 30 TABLET | Refills: 1 | Status: SHIPPED | OUTPATIENT
Start: 2021-01-21 | End: 2021-11-24 | Stop reason: ALTCHOICE

## 2021-01-21 NOTE — PROGRESS NOTES
Assessment/Plan:         Diagnoses and all orders for this visit:    Dyspepsia  -     pantoprazole (PROTONIX) 40 mg tablet; Take 1 tablet (40 mg total) by mouth daily before breakfast  Will start patient on Protonix for 6 weeks  Vitamin D deficiency  -     ergocalciferol (VITAMIN D2) 50,000 units; Take 1 capsule (50,000 Units total) by mouth once a week    Depression, unspecified depression type    continue current regimen excellent improvement    Subjective:      Patient ID: Jazz Horvath is a 23 y o  female  HPI  Patient with history ulcerative colitis on Stelara is here to follow-up on depression management, recent lab studies and dyspepsia  I had started on Pepcid mildly Reading H pylori I testing on her  Her test came back negative  She was found to have very low vitamin-D  A supplement was called in  I had also started on Zoloft last time she reports excellent improvement on 25 mg of Zoloft  She does not feel overwhelmed crying anxious  She denies any pressured speech racing thoughts  The following portions of the patient's history were reviewed and updated as appropriate: allergies, current medications, past family history, past medical history, past social history, past surgical history and problem list     Review of Systems      Objective:      /80 (BP Location: Left arm, Patient Position: Sitting, Cuff Size: Large)   Pulse 90   Temp (!) 97 1 °F (36 2 °C)   Resp 16   Ht 5' 5" (1 651 m)   Wt 102 kg (224 lb 8 oz)   SpO2 98%   BMI 37 36 kg/m²          Physical Exam  Abdominal:      General: Bowel sounds are normal  There is no distension  Palpations: Abdomen is soft  There is no mass  Tenderness: There is no abdominal tenderness  There is no guarding or rebound  Hernia: No hernia is present  Psychiatric:         Attention and Perception: Attention normal          Mood and Affect: Mood normal          Speech: Speech normal  Speech is not rapid and pressured  Behavior: Behavior normal          Thought Content: Thought content normal  Thought content is not paranoid or delusional  Thought content does not include suicidal plan           Cognition and Memory: Cognition and memory normal          Judgment: Judgment normal

## 2021-02-12 ENCOUNTER — LAB (OUTPATIENT)
Dept: LAB | Facility: HOSPITAL | Age: 21
End: 2021-02-12
Payer: COMMERCIAL

## 2021-02-12 ENCOUNTER — TRANSCRIBE ORDERS (OUTPATIENT)
Dept: LAB | Facility: HOSPITAL | Age: 21
End: 2021-02-12

## 2021-02-12 DIAGNOSIS — K51.811 OTHER ULCERATIVE COLITIS WITH RECTAL BLEEDING (HCC): ICD-10-CM

## 2021-02-12 DIAGNOSIS — K51.811 OTHER ULCERATIVE COLITIS WITH RECTAL BLEEDING (HCC): Primary | ICD-10-CM

## 2021-02-12 LAB
ALBUMIN SERPL BCP-MCNC: 4.3 G/DL (ref 3–5.2)
ALP SERPL-CCNC: 68 U/L (ref 43–122)
ALT SERPL W P-5'-P-CCNC: 20 U/L (ref 9–52)
ANION GAP SERPL CALCULATED.3IONS-SCNC: 7 MMOL/L (ref 5–14)
AST SERPL W P-5'-P-CCNC: 30 U/L (ref 14–36)
BASOPHILS # BLD AUTO: 0.1 THOUSANDS/ΜL (ref 0–0.1)
BASOPHILS NFR BLD AUTO: 1 % (ref 0–1)
BILIRUB SERPL-MCNC: 1 MG/DL
BUN SERPL-MCNC: 10 MG/DL (ref 5–25)
CALCIUM SERPL-MCNC: 9.5 MG/DL (ref 8.4–10.2)
CHLORIDE SERPL-SCNC: 100 MMOL/L (ref 97–108)
CO2 SERPL-SCNC: 30 MMOL/L (ref 22–30)
CREAT SERPL-MCNC: 0.79 MG/DL (ref 0.6–1.2)
CRP SERPL QL: 14.3 MG/L
EOSINOPHIL # BLD AUTO: 0.3 THOUSAND/ΜL (ref 0–0.4)
EOSINOPHIL NFR BLD AUTO: 3 % (ref 0–6)
ERYTHROCYTE [DISTWIDTH] IN BLOOD BY AUTOMATED COUNT: 12.8 %
GFR SERPL CREATININE-BSD FRML MDRD: 107 ML/MIN/1.73SQ M
GLUCOSE P FAST SERPL-MCNC: 95 MG/DL (ref 70–99)
HBV SURFACE AG SER QL: NORMAL
HCT VFR BLD AUTO: 39.4 % (ref 36–46)
HGB BLD-MCNC: 12.7 G/DL (ref 12–16)
LYMPHOCYTES # BLD AUTO: 1.2 THOUSANDS/ΜL (ref 0.5–4)
LYMPHOCYTES NFR BLD AUTO: 12 % (ref 25–45)
MCH RBC QN AUTO: 29.2 PG (ref 26–34)
MCHC RBC AUTO-ENTMCNC: 32.3 G/DL (ref 31–36)
MCV RBC AUTO: 90 FL (ref 80–100)
MONOCYTES # BLD AUTO: 0.7 THOUSAND/ΜL (ref 0.2–0.9)
MONOCYTES NFR BLD AUTO: 6 % (ref 1–10)
NEUTROPHILS # BLD AUTO: 8.4 THOUSANDS/ΜL (ref 1.8–7.8)
NEUTS SEG NFR BLD AUTO: 78 % (ref 45–65)
PLATELET # BLD AUTO: 367 THOUSANDS/UL (ref 150–450)
PMV BLD AUTO: 8.4 FL (ref 8.9–12.7)
POTASSIUM SERPL-SCNC: 4.5 MMOL/L (ref 3.6–5)
PROT SERPL-MCNC: 8.6 G/DL (ref 5.9–8.4)
RBC # BLD AUTO: 4.37 MILLION/UL (ref 4–5.2)
SODIUM SERPL-SCNC: 137 MMOL/L (ref 137–147)
WBC # BLD AUTO: 10.7 THOUSAND/UL (ref 4.5–11)

## 2021-02-12 PROCEDURE — 82397 CHEMILUMINESCENT ASSAY: CPT

## 2021-02-12 PROCEDURE — 86480 TB TEST CELL IMMUN MEASURE: CPT

## 2021-02-12 PROCEDURE — 36415 COLL VENOUS BLD VENIPUNCTURE: CPT

## 2021-02-12 PROCEDURE — 85025 COMPLETE CBC W/AUTO DIFF WBC: CPT

## 2021-02-12 PROCEDURE — 80053 COMPREHEN METABOLIC PANEL: CPT

## 2021-02-12 PROCEDURE — 86140 C-REACTIVE PROTEIN: CPT

## 2021-02-12 PROCEDURE — 80299 QUANTITATIVE ASSAY DRUG: CPT

## 2021-02-12 PROCEDURE — 87340 HEPATITIS B SURFACE AG IA: CPT

## 2021-02-13 ENCOUNTER — LAB (OUTPATIENT)
Dept: LAB | Facility: HOSPITAL | Age: 21
End: 2021-02-13
Payer: COMMERCIAL

## 2021-02-13 DIAGNOSIS — K51.811 OTHER ULCERATIVE COLITIS WITH RECTAL BLEEDING (HCC): ICD-10-CM

## 2021-02-13 DIAGNOSIS — E61.1 IRON DEFICIENCY: ICD-10-CM

## 2021-02-13 PROCEDURE — 87493 C DIFF AMPLIFIED PROBE: CPT

## 2021-02-14 LAB
C DIFF TOX A+B STL QL IA: NEGATIVE
C DIFF TOX B TCDB STL QL NAA+PROBE: POSITIVE

## 2021-02-15 ENCOUNTER — LAB (OUTPATIENT)
Dept: LAB | Facility: HOSPITAL | Age: 21
End: 2021-02-15
Payer: COMMERCIAL

## 2021-02-15 DIAGNOSIS — K51.811 OTHER ULCERATIVE COLITIS WITH RECTAL BLEEDING (HCC): ICD-10-CM

## 2021-02-15 LAB
GAMMA INTERFERON BACKGROUND BLD IA-ACNC: 0.02 IU/ML
M TB IFN-G BLD-IMP: NEGATIVE
M TB IFN-G CD4+ BCKGRND COR BLD-ACNC: 0 IU/ML
M TB IFN-G CD4+ BCKGRND COR BLD-ACNC: 0.01 IU/ML
MITOGEN IGNF BCKGRD COR BLD-ACNC: >10 IU/ML

## 2021-02-15 PROCEDURE — 83993 ASSAY FOR CALPROTECTIN FECAL: CPT

## 2021-02-15 RX ORDER — LORATADINE 10 MG
TABLET ORAL
Qty: 30 TABLET | Refills: 1 | Status: SHIPPED | OUTPATIENT
Start: 2021-02-15 | End: 2021-04-08

## 2021-02-24 LAB — CALPROTECTIN STL-MCNT: 807 UG/G (ref 0–120)

## 2021-02-26 ENCOUNTER — OFFICE VISIT (OUTPATIENT)
Dept: FAMILY MEDICINE CLINIC | Facility: CLINIC | Age: 21
End: 2021-02-26
Payer: COMMERCIAL

## 2021-02-26 ENCOUNTER — APPOINTMENT (OUTPATIENT)
Dept: LAB | Facility: HOSPITAL | Age: 21
End: 2021-02-26
Payer: COMMERCIAL

## 2021-02-26 VITALS
HEIGHT: 65 IN | WEIGHT: 234 LBS | DIASTOLIC BLOOD PRESSURE: 90 MMHG | SYSTOLIC BLOOD PRESSURE: 140 MMHG | RESPIRATION RATE: 18 BRPM | BODY MASS INDEX: 38.99 KG/M2 | TEMPERATURE: 99 F | HEART RATE: 90 BPM | OXYGEN SATURATION: 99 %

## 2021-02-26 DIAGNOSIS — E55.9 VITAMIN D DEFICIENCY: ICD-10-CM

## 2021-02-26 DIAGNOSIS — R63.5 WEIGHT GAIN: Primary | ICD-10-CM

## 2021-02-26 LAB
ALBUMIN SERPL BCP-MCNC: 4.3 G/DL (ref 3–5.2)
ALP SERPL-CCNC: 66 U/L (ref 43–122)
ALT SERPL W P-5'-P-CCNC: 9 U/L (ref 9–52)
ANION GAP SERPL CALCULATED.3IONS-SCNC: 10 MMOL/L (ref 5–14)
AST SERPL W P-5'-P-CCNC: 22 U/L (ref 14–36)
BASOPHILS # BLD AUTO: 0.1 THOUSANDS/ΜL (ref 0–0.1)
BASOPHILS NFR BLD AUTO: 1 % (ref 0–1)
BILIRUB SERPL-MCNC: 0.5 MG/DL
BUN SERPL-MCNC: 16 MG/DL (ref 5–25)
CALCIUM SERPL-MCNC: 9.7 MG/DL (ref 8.4–10.2)
CHLORIDE SERPL-SCNC: 103 MMOL/L (ref 97–108)
CO2 SERPL-SCNC: 27 MMOL/L (ref 22–30)
CREAT SERPL-MCNC: 0.78 MG/DL (ref 0.6–1.2)
EOSINOPHIL # BLD AUTO: 0.2 THOUSAND/ΜL (ref 0–0.4)
EOSINOPHIL NFR BLD AUTO: 2 % (ref 0–6)
ERYTHROCYTE [DISTWIDTH] IN BLOOD BY AUTOMATED COUNT: 12.6 %
GFR SERPL CREATININE-BSD FRML MDRD: 108 ML/MIN/1.73SQ M
GLUCOSE P FAST SERPL-MCNC: 89 MG/DL (ref 70–99)
HCT VFR BLD AUTO: 40.1 % (ref 36–46)
HGB BLD-MCNC: 13 G/DL (ref 12–16)
LYMPHOCYTES # BLD AUTO: 1.2 THOUSANDS/ΜL (ref 0.5–4)
LYMPHOCYTES NFR BLD AUTO: 11 % (ref 25–45)
MCH RBC QN AUTO: 29.2 PG (ref 26–34)
MCHC RBC AUTO-ENTMCNC: 32.4 G/DL (ref 31–36)
MCV RBC AUTO: 90 FL (ref 80–100)
MONOCYTES # BLD AUTO: 0.5 THOUSAND/ΜL (ref 0.2–0.9)
MONOCYTES NFR BLD AUTO: 5 % (ref 1–10)
NEUTROPHILS # BLD AUTO: 8.5 THOUSANDS/ΜL (ref 1.8–7.8)
NEUTS SEG NFR BLD AUTO: 81 % (ref 45–65)
PLATELET # BLD AUTO: 430 THOUSANDS/UL (ref 150–450)
PMV BLD AUTO: 8.3 FL (ref 8.9–12.7)
POTASSIUM SERPL-SCNC: 4.4 MMOL/L (ref 3.6–5)
PROT SERPL-MCNC: 8.7 G/DL (ref 5.9–8.4)
RBC # BLD AUTO: 4.45 MILLION/UL (ref 4–5.2)
SODIUM SERPL-SCNC: 140 MMOL/L (ref 137–147)
WBC # BLD AUTO: 10.5 THOUSAND/UL (ref 4.5–11)

## 2021-02-26 PROCEDURE — 85025 COMPLETE CBC W/AUTO DIFF WBC: CPT | Performed by: INTERNAL MEDICINE

## 2021-02-26 PROCEDURE — 36415 COLL VENOUS BLD VENIPUNCTURE: CPT | Performed by: INTERNAL MEDICINE

## 2021-02-26 PROCEDURE — 80053 COMPREHEN METABOLIC PANEL: CPT | Performed by: INTERNAL MEDICINE

## 2021-02-26 PROCEDURE — 3008F BODY MASS INDEX DOCD: CPT | Performed by: INTERNAL MEDICINE

## 2021-02-26 PROCEDURE — 99214 OFFICE O/P EST MOD 30 MIN: CPT | Performed by: INTERNAL MEDICINE

## 2021-02-26 RX ORDER — ERGOCALCIFEROL 1.25 MG/1
50000 CAPSULE ORAL WEEKLY
Qty: 8 CAPSULE | Refills: 0 | Status: SHIPPED | OUTPATIENT
Start: 2021-02-26 | End: 2021-11-24 | Stop reason: ALTCHOICE

## 2021-02-26 RX ORDER — VANCOMYCIN HYDROCHLORIDE 125 MG/1
CAPSULE ORAL
COMMUNITY
Start: 2021-02-16 | End: 2021-11-24 | Stop reason: ALTCHOICE

## 2021-02-26 NOTE — PROGRESS NOTES
Assessment/Plan:         Diagnoses and all orders for this visit:    Weight gain  -     CBC and differential  -     Comprehensive metabolic panel  -     TSH, 3rd generation with Free T4 reflex  -     Ambulatory referral to Weight Management; Future    Vitamin D deficiency  -     ergocalciferol (VITAMIN D2) 50,000 units; Take 1 capsule (50,000 Units total) by mouth once a week        Subjective:      Patient ID: Jazz Horvath is a 23 y o  female  HPI   patient is here concerned about drinking that he has noticing over the last several months  Since last visit she has gained 4 lb  She does report increased appetite but has not consumed more calories than she has done in the past   She is worried about her weight  TSH checked in November was normal   She reports her depression to be managed better control since she has been on Zoloft 25 mg a day  Exercise was discussed  At this point I would also recommend following up with weight loss program   Referral will be made  Overall to repeat her thyroid functions  The following portions of the patient's history were reviewed and updated as appropriate: allergies, current medications, past family history, past medical history, past social history, past surgical history and problem list     Review of Systems      Objective:      /90 (BP Location: Left arm, Patient Position: Sitting, Cuff Size: Adult)   Pulse 90   Temp 99 °F (37 2 °C)   Resp 18   Ht 5' 5" (1 651 m)   Wt 106 kg (234 lb)   SpO2 99%   BMI 38 94 kg/m²          Physical Exam  Constitutional:       Comments:  high BMI noted   HENT:      Head:      Comments: No moon facies   Cardiovascular:      Comments: No  Dorsal  Fa pads  Psychiatric:         Mood and Affect: Mood normal          Behavior: Behavior normal          Thought Content: Thought content normal              BMI Counseling: Body mass index is 38 94 kg/m²   The BMI is above normal  Nutrition recommendations include decreasing portion sizes, limiting drinks that contain sugar, moderation in carbohydrate intake, increasing intake of lean protein, reducing intake of saturated and trans fat and reducing intake of cholesterol  Exercise recommendations include exercising 3-5 times per week  Patient referred to weight management due to patient being overweight

## 2021-02-27 DIAGNOSIS — R10.13 DYSPEPSIA: ICD-10-CM

## 2021-03-01 RX ORDER — FAMOTIDINE 20 MG/1
TABLET, FILM COATED ORAL
Qty: 30 TABLET | Refills: 1 | Status: SHIPPED | OUTPATIENT
Start: 2021-03-01 | End: 2021-11-24 | Stop reason: ALTCHOICE

## 2021-03-04 LAB — MISCELLANEOUS LAB TEST RESULT: NORMAL

## 2021-03-23 ENCOUNTER — HOSPITAL ENCOUNTER (EMERGENCY)
Facility: HOSPITAL | Age: 21
Discharge: HOME | End: 2021-03-23
Attending: EMERGENCY MEDICINE | Admitting: EMERGENCY MEDICINE
Payer: COMMERCIAL

## 2021-03-23 ENCOUNTER — APPOINTMENT (EMERGENCY)
Dept: RADIOLOGY | Facility: HOSPITAL | Age: 21
End: 2021-03-23
Payer: COMMERCIAL

## 2021-03-23 ENCOUNTER — APPOINTMENT (EMERGENCY)
Dept: CT IMAGING | Facility: HOSPITAL | Age: 21
End: 2021-03-23
Payer: COMMERCIAL

## 2021-03-23 VITALS
WEIGHT: 231.7 LBS | HEART RATE: 70 BPM | SYSTOLIC BLOOD PRESSURE: 146 MMHG | RESPIRATION RATE: 16 BRPM | DIASTOLIC BLOOD PRESSURE: 80 MMHG | BODY MASS INDEX: 38.56 KG/M2 | TEMPERATURE: 98.2 F | OXYGEN SATURATION: 100 %

## 2021-03-23 DIAGNOSIS — R07.89 ATYPICAL CHEST PAIN: Primary | ICD-10-CM

## 2021-03-23 LAB
ALBUMIN SERPL BCP-MCNC: 4.3 G/DL (ref 3–5.2)
ALP SERPL-CCNC: 68 U/L (ref 43–122)
ALT SERPL W P-5'-P-CCNC: 34 U/L (ref 9–52)
ANION GAP SERPL CALCULATED.3IONS-SCNC: 7 MMOL/L (ref 5–14)
AST SERPL W P-5'-P-CCNC: 32 U/L (ref 14–36)
ATRIAL RATE: 76 BPM
BASOPHILS # BLD AUTO: 0.1 THOUSANDS/ΜL (ref 0–0.1)
BASOPHILS NFR BLD AUTO: 1 % (ref 0–1)
BILIRUB SERPL-MCNC: 0.7 MG/DL
BUN SERPL-MCNC: 11 MG/DL (ref 5–25)
CALCIUM SERPL-MCNC: 10.2 MG/DL (ref 8.4–10.2)
CHLORIDE SERPL-SCNC: 99 MMOL/L (ref 97–108)
CO2 SERPL-SCNC: 30 MMOL/L (ref 22–30)
CREAT SERPL-MCNC: 0.77 MG/DL (ref 0.6–1.2)
D DIMER PPP FEU-MCNC: 0.83 UG/ML FEU
EOSINOPHIL # BLD AUTO: 0.3 THOUSAND/ΜL (ref 0–0.4)
EOSINOPHIL NFR BLD AUTO: 3 % (ref 0–6)
ERYTHROCYTE [DISTWIDTH] IN BLOOD BY AUTOMATED COUNT: 12.4 %
EXT PREG TEST URINE: NEGATIVE
EXT. CONTROL ED NAV: NORMAL
GFR SERPL CREATININE-BSD FRML MDRD: 110 ML/MIN/1.73SQ M
GLUCOSE SERPL-MCNC: 104 MG/DL (ref 70–99)
HCT VFR BLD AUTO: 38.9 % (ref 36–46)
HGB BLD-MCNC: 13.4 G/DL (ref 12–16)
LIPASE SERPL-CCNC: 64 U/L (ref 23–300)
LYMPHOCYTES # BLD AUTO: 1.1 THOUSANDS/ΜL (ref 0.5–4)
LYMPHOCYTES NFR BLD AUTO: 12 % (ref 25–45)
MCH RBC QN AUTO: 30.6 PG (ref 26–34)
MCHC RBC AUTO-ENTMCNC: 34.5 G/DL (ref 31–36)
MCV RBC AUTO: 89 FL (ref 80–100)
MONOCYTES # BLD AUTO: 0.7 THOUSAND/ΜL (ref 0.2–0.9)
MONOCYTES NFR BLD AUTO: 7 % (ref 1–10)
NEUTROPHILS # BLD AUTO: 7.3 THOUSANDS/ΜL (ref 1.8–7.8)
NEUTS SEG NFR BLD AUTO: 77 % (ref 45–65)
P AXIS: 56 DEGREES
PLATELET # BLD AUTO: 361 THOUSANDS/UL (ref 150–450)
PMV BLD AUTO: 7.8 FL (ref 8.9–12.7)
POTASSIUM SERPL-SCNC: 4.2 MMOL/L (ref 3.6–5)
PR INTERVAL: 124 MS
PROT SERPL-MCNC: 8.5 G/DL (ref 5.9–8.4)
QRS AXIS: 72 DEGREES
QRSD INTERVAL: 86 MS
QT INTERVAL: 370 MS
QTC INTERVAL: 416 MS
RBC # BLD AUTO: 4.38 MILLION/UL (ref 4–5.2)
SODIUM SERPL-SCNC: 136 MMOL/L (ref 137–147)
T WAVE AXIS: 43 DEGREES
TROPONIN I SERPL-MCNC: <0.01 NG/ML (ref 0–0.03)
VENTRICULAR RATE: 76 BPM
WBC # BLD AUTO: 9.5 THOUSAND/UL (ref 4.5–11)

## 2021-03-23 PROCEDURE — G1004 CDSM NDSC: HCPCS

## 2021-03-23 PROCEDURE — 36415 COLL VENOUS BLD VENIPUNCTURE: CPT | Performed by: EMERGENCY MEDICINE

## 2021-03-23 PROCEDURE — 84484 ASSAY OF TROPONIN QUANT: CPT | Performed by: EMERGENCY MEDICINE

## 2021-03-23 PROCEDURE — 93005 ELECTROCARDIOGRAM TRACING: CPT

## 2021-03-23 PROCEDURE — 99285 EMERGENCY DEPT VISIT HI MDM: CPT

## 2021-03-23 PROCEDURE — 85025 COMPLETE CBC W/AUTO DIFF WBC: CPT | Performed by: EMERGENCY MEDICINE

## 2021-03-23 PROCEDURE — 83690 ASSAY OF LIPASE: CPT | Performed by: EMERGENCY MEDICINE

## 2021-03-23 PROCEDURE — 81025 URINE PREGNANCY TEST: CPT | Performed by: EMERGENCY MEDICINE

## 2021-03-23 PROCEDURE — 80053 COMPREHEN METABOLIC PANEL: CPT | Performed by: EMERGENCY MEDICINE

## 2021-03-23 PROCEDURE — 71275 CT ANGIOGRAPHY CHEST: CPT

## 2021-03-23 PROCEDURE — 71045 X-RAY EXAM CHEST 1 VIEW: CPT

## 2021-03-23 PROCEDURE — 99285 EMERGENCY DEPT VISIT HI MDM: CPT | Performed by: EMERGENCY MEDICINE

## 2021-03-23 PROCEDURE — 93010 ELECTROCARDIOGRAM REPORT: CPT | Performed by: INTERNAL MEDICINE

## 2021-03-23 PROCEDURE — 85379 FIBRIN DEGRADATION QUANT: CPT | Performed by: EMERGENCY MEDICINE

## 2021-03-23 RX ADMIN — IOHEXOL 85 ML: 350 INJECTION, SOLUTION INTRAVENOUS at 12:50

## 2021-03-23 NOTE — ED PROVIDER NOTES
History  Chief Complaint   Patient presents with    Chest Pain     states she had left sided chest pain and left arm numbness yesterday during a picnic and this morning as well  states she also had epigastric pain while having the chest pain     HPI      This is a very pleasant, nontoxic, 68-year-old female who is right-hand dominant presents the emergency department with a chief complaint intermittent left-sided chest pain that started approximately 3:00 p m  Yesterday while on a picnic with family members  Patient was eating food at this time, blue berries / pineapples  Patient describes it as sharp stabbing sensation not associated with nausea, shortness of breath, diaphoresis  Patient reports that she left shoulder and left arm pain associated this intermittent chest pain that lasted for approximately 10 minutes and then go away spontaneously  Patient also has some epigastric discomfort at the beginning of the day  No relevant surgical history, no history of tobacco abuse illicit drug use or alcohol abuse  Patient is followed by [facility]: Dr Romeo Mcgarry: Dyspepsia: was placed on protonix: 40 mg qD x 6 weeks, finished protonix, was supposed to have follow up with GI, never did: patient cancelled the appointment  Other relevant history is Crohn disease of the colon, fatigue, history of iron deficiency anemia  Prior to Admission Medications   Prescriptions Last Dose Informant Patient Reported? Taking?    [facility] Vitamin C 500 MG tablet 3/22/2021 at Unknown time  No Yes   Sig: QD   Multiple Vitamins-Minerals (HAIR SKIN AND NAILS FORMULA PO) 3/22/2021 at Unknown time Self Yes Yes   Sig: Take by mouth   STELARA 90 MG/ML subcutaneous injection More than a month at Unknown time Self Yes No   clindamycin (CLEOCIN T) 1 % More than a month at Unknown time  Yes No   Sig: APPLY TO AREAS OF ACNE TWICE A DAY   ergocalciferol (VITAMIN D2) 50,000 units 3/22/2021 at Unknown time  No Yes   Sig: Take 1 capsule (50,000 Units total) by mouth once a week   famotidine (PEPCID) 20 mg tablet Not Taking at Unknown time  No No   Sig: TAKE 1 TABLET BY MOUTH EVERY DAY   Patient not taking: Reported on 3/23/2021   norgestimate-ethinyl estradiol (Sprintec 28) 0 25-35 MG-MCG per tablet 3/22/2021 at Unknown time  No Yes   Sig: Take 1 tablet by mouth daily   pantoprazole (PROTONIX) 40 mg tablet Not Taking at Unknown time  No No   Sig: Take 1 tablet (40 mg total) by mouth daily before breakfast   Patient not taking: Reported on 3/23/2021   sertraline (ZOLOFT) 25 mg tablet 3/22/2021 at Unknown time  No Yes   Sig: Take 1 tablet (25 mg total) by mouth daily   vancomycin (VANCOCIN) 125 MG capsule Not Taking at Unknown time  Yes No   Si (ONE) CAPSULE 4 TIMES A DAY X 10 DAYS      Facility-Administered Medications: None       Past Medical History:   Diagnosis Date    [facility]     Hidradenitis suppurativa     thighs    Obesity, Class II, BMI 35-39 9     TMJ (temporomandibular joint disorder)     last assessed 2016    Urinary tract infection     Varicella     vaccine       Past Surgical History:   Procedure Laterality Date    COLONOSCOPY      x several; last one 2020    WISDOM TOOTH EXTRACTION         Family History   Problem Relation Age of Onset    Diabetes Mother         Due to underlying condition with foot ulcer  Type 2 DM without complication   Diabetes Father     Polycystic ovary syndrome Sister     Stroke Neg Hx     Heart attack Neg Hx     Hypertension Neg Hx     Breast cancer Neg Hx     Colon cancer Neg Hx     Ovarian cancer Neg Hx     Uterine cancer Neg Hx      I have reviewed and agree with the history as documented      E-Cigarette/Vaping    E-Cigarette Use Never User      E-Cigarette/Vaping Substances     Social History     Tobacco Use    Smoking status: Light Tobacco Smoker    Smokeless tobacco: Never Used    Tobacco comment: hooka smoke   Substance Use Topics    Alcohol use: No    Drug use: No       Review of Systems   Constitutional: Negative  HENT: Negative  Eyes: Negative  Respiratory: Positive for chest tightness and shortness of breath  Cardiovascular: Positive for chest pain  Gastrointestinal: Positive for abdominal pain  Endocrine: Negative  Genitourinary: Negative  Musculoskeletal: Negative  Skin: Negative  Allergic/Immunologic: Negative  Neurological: Negative  Hematological: Negative  Psychiatric/Behavioral: Negative  Physical Exam  Physical Exam  Vitals signs and nursing note reviewed  Constitutional:       Appearance: She is well-developed and normal weight  HENT:      Head: Normocephalic and atraumatic  Eyes:      Extraocular Movements: Extraocular movements intact  Pupils: Pupils are equal, round, and reactive to light  Neck:      Musculoskeletal: Normal range of motion and neck supple  Cardiovascular:      Rate and Rhythm: Normal rate and regular rhythm  Heart sounds: Normal heart sounds  Pulmonary:      Effort: Pulmonary effort is normal       Breath sounds: Normal breath sounds  Abdominal:      General: Bowel sounds are normal       Palpations: Abdomen is soft  Musculoskeletal: Normal range of motion  Skin:     General: Skin is warm  Capillary Refill: Capillary refill takes less than 2 seconds  Neurological:      General: No focal deficit present  Mental Status: She is alert and oriented to person, place, and time     Psychiatric:         Mood and Affect: Mood normal          Behavior: Behavior normal          Vital Signs  ED Triage Vitals [03/23/21 1103]   Temperature Pulse Respirations Blood Pressure SpO2   98 2 °F (36 8 °C) 88 16 152/94 99 %      Temp Source Heart Rate Source Patient Position - Orthostatic VS BP Location FiO2 (%)   Tympanic Monitor Lying Left arm --      Pain Score       7           Vitals:    03/23/21 1103 03/23/21 1304   BP: 152/94 146/80   Pulse: 88 70   Patient Position - Orthostatic VS: Lying Lying         Visual Acuity      ED Medications  Medications   iohexol (OMNIPAQUE) 350 MG/ML injection (SINGLE-DOSE) 100 mL (85 mL Intravenous Given 3/23/21 1250)       Diagnostic Studies  Results Reviewed     Procedure Component Value Units Date/Time    Troponin I [[id number]]  (Normal) Collected: 03/23/21 1131    Lab Status: Final result Specimen: Blood from Arm, Right Updated: 03/23/21 1216     Troponin I <0 01 ng/mL     D-Dimer [[id number]]  (Abnormal) Collected: 03/23/21 1131    Lab Status: Final result Specimen: Blood from Arm, Right Updated: 03/23/21 1208     D-Dimer, Quant 0 83 ug/ml FEU     Lipase [[id number]]  (Normal) Collected: 03/23/21 1131    Lab Status: Final result Specimen: Blood from Arm, Right Updated: 03/23/21 1205     Lipase 64 u/L     Comprehensive metabolic panel [[id number]]  (Abnormal) Collected: 03/23/21 1131    Lab Status: Final result Specimen: Blood from Arm, Right Updated: 03/23/21 1205     Sodium 136 mmol/L      Potassium 4 2 mmol/L      Chloride 99 mmol/L      CO2 30 mmol/L      ANION GAP 7 mmol/L      BUN 11 mg/dL      Creatinine 0 77 mg/dL      Glucose 104 mg/dL      Calcium 10 2 mg/dL      AST 32 U/L      ALT 34 U/L      Alkaline Phosphatase 68 U/L      Total Protein 8 5 g/dL      Albumin 4 3 g/dL      Total Bilirubin 0 70 mg/dL      eGFR 110 ml/min/1 73sq m     Narrative:      Vincenzo guidelines for Chronic Kidney Disease (CKD):     Stage 1 with normal or high GFR (GFR > 90 mL/min/1 73 square meters)    Stage 2 Mild CKD (GFR = 60-89 mL/min/1 73 square meters)    Stage 3A Moderate CKD (GFR = 45-59 mL/min/1 73 square meters)    Stage 3B Moderate CKD (GFR = 30-44 mL/min/1 73 square meters)    Stage 4 Severe CKD (GFR = 15-29 mL/min/1 73 square meters)    Stage 5 End Stage CKD (GFR <15 mL/min/1 73 square meters)  Note: GFR calculation is accurate only with a steady state creatinine    CBC and differential [[id number]]  (Abnormal) Collected: 03/23/21 1132    Lab Status: Final result Specimen: Blood from Arm, Right Updated: 03/23/21 1156     WBC 9 50 Thousand/uL      RBC 4 38 Million/uL      Hemoglobin 13 4 g/dL      Hematocrit 38 9 %      MCV 89 fL      MCH 30 6 pg      MCHC 34 5 g/dL      RDW 12 4 %      MPV 7 8 fL      Platelets 379 Thousands/uL      Neutrophils Relative 77 %      Lymphocytes Relative 12 %      Monocytes Relative 7 %      Eosinophils Relative 3 %      Basophils Relative 1 %      Neutrophils Absolute 7 30 Thousands/µL      Lymphocytes Absolute 1 10 Thousands/µL      Monocytes Absolute 0 70 Thousand/µL      Eosinophils Absolute 0 30 Thousand/µL      Basophils Absolute 0 10 Thousands/µL     POCT pregnancy, urine [[id number]]  (Normal) Resulted: 03/23/21 1151    Lab Status: Final result Updated: 03/23/21 1152     EXT PREG TEST UR (Ref: Negative) negative     Control valid                 CTA ED chest PE study   Final Result by Didi Houston MD (03/23 1315)      No pulmonary embolism  Workstation performed: [id number]         XR chest 1 view portable   Final Result by Didi Houston MD (03/23 1315)      No acute cardiopulmonary disease  Workstation performed: [id number]                    Procedures  ECG 12 Lead Documentation Only    Date/Time: 3/23/2021 11:12 AM  Performed by: Ron Mccall III, DO  Authorized by: Ron Mccall III, DO     Indications / Diagnosis:  Chest pain  ECG reviewed by me, the ED Provider: yes    Patient location:  ED  Comments:      I purposely reviewed this EKG is performed the patient March 23, 2021  EKG was completed at 11:05 a m  And interpreted by me at 11:12 a m   Normal sinus rhythm with no acute ST abnormalities  No evidence of Brugada syndrome  Ventricular rate of 76 beats per minute  P r n  Oval 24 milliseconds  Patient's QRS duration is 86 ms               ED Course  ED Course as of Mar 23 1332   Tue Mar 23, 2021   1110 History physical completed  Orders placed  1221 Patient's D-dimer was positive, low risk Wells criteria, low risk PERC score low risk Tift score, heart score 0, will proceed with a CT of the chest to rule out pulmonary embolism  HEART Risk Score      Most Recent Value   Heart Score Risk Calculator   History  0 Filed at: 03/23/2021 1217   ECG  0 Filed at: 03/23/2021 1217   Age  0 Filed at: 03/23/2021 1217   Risk Factors  0 Filed at: 03/23/2021 1217   Troponin  0 Filed at: 03/23/2021 1217   HEART Score  0 Filed at: 03/23/2021 1217              PERC Rule for PE      Most Recent Value   PERC Rule for PE   Age >=50  0 Filed at: 03/23/2021 1222   HR >=100  0 Filed at: 03/23/2021 1222   O2 Sat on room air < 95%  0 Filed at: 03/23/2021 1222   History of PE or DVT  0 Filed at: 03/23/2021 1222   Recent trauma or surgery  0 Filed at: 03/23/2021 1222   Hemoptysis  0 Filed at: 03/23/2021 1222   Exogenous estrogen  0 Filed at: 03/23/2021 1222   Unilateral leg swelling  0 Filed at: 03/23/2021 1222   PERC Rule for PE Results  0 Filed at: 03/23/2021 1222              SBIRT 22yo+      Most Recent Value   SBIRT (23 yo +)   In order to provide better care to our patients, we are screening all of our patients for alcohol and drug use  Would it be okay to ask you these screening questions? Yes Filed at: 03/23/2021 1123   Initial Alcohol Screen: US AUDIT-C    1  How often do you have a drink containing alcohol?  0 Filed at: 03/23/2021 1123   2  How many drinks containing alcohol do you have on a typical day you are drinking? 0 Filed at: 03/23/2021 1123   3b  FEMALE Any Age, or MALE 65+: How often do you have 4 or more drinks on one occassion? 0 Filed at: 03/23/2021 1123   Audit-C Score  0 Filed at: 03/23/2021 1123   MENDEZ: How many times in the past year have you    Used an illegal drug or used a prescription medication for non-medical reasons?   Never Filed at: 03/23/2021 1123          Israel' Criteria for PE      Most Recent Value   Wells' Criteria for PE   Clinical signs and symptoms of DVT  0 Filed at: 03/23/2021 1222   PE is primary diagnosis or equally likely  0 Filed at: 03/23/2021 1222   HR >100  0 Filed at: 03/23/2021 1222   Immobilization at least 3 days or Surgery in the previous 4 weeks  0 Filed at: 03/23/2021 1222   Previous, objectively diagnosed PE or DVT  0 Filed at: 03/23/2021 1222   Hemoptysis  0 Filed at: 03/23/2021 1222   Malignancy with treatment within 6 months or palliative  0 Filed at: 03/23/2021 1222   Wells' Criteria Total  0 Filed at: 03/23/2021 1222        Wells' Criteria for DVT      Most Recent Value   Wells' Criteria for DVT   Active cancer Treatment or palliation within 6 months  0 Filed at: 03/23/2021 1223   Bedridden recently >3 days or major surgery within 12 weeks  0 Filed at: 03/23/2021 1223   Calf swelling >3 cm compared to the other leg  0 Filed at: 03/23/2021 1223   Entire leg swollen  0 Filed at: 03/23/2021 1223   Collateral (nonvaricose) superficial veins present  0 Filed at: 03/23/2021 1223   Localized tenderness along the deep venous system  0 Filed at: 03/23/2021 1223   Pitting edema, confined to symptomatic leg  0 Filed at: 03/23/2021 1223   Paralysis, paresis, or recent plaster immobilization of the lower extremity  0 Filed at: 03/23/2021 1223   Previously documented DVT  0 Filed at: 03/23/2021 1223   Alternative diagnosis to DVT as likely or more likely  0 Filed at: 03/23/2021 1223   Wells DVT Critera Score  0 Filed at: 03/23/2021 1223              MDM  Number of Diagnoses or Management Options  Atypical chest pain:   Diagnosis management comments: 73-year-old female presents the emergency department left-sided chest pain left arm pain  Patient reports pleuritic nature to the patient's chest pain  D-dimer positive, PERC score low risk, low risk Wells criteria, heart score 0, Harris score low risk, ADD-RS negative, CT of chest PE study negative    No focal abdominal pain to suggest gallstones as a pathology, patient's baseline LFTs and lipase all within normal limits  Other considerations are gastritis vs  Other forms of dyspepsia, encouraged patient fall with GI   D/c to home in stable condition  Portions of the record may have been created with voice recognition software  Occasional wrong word or "sound a like" substitutions may have occurred due to the inherent limitations of voice recognition software  Read the chart carefully and recognize, using context, where substitutions have occurred  Counseling: I had a detailed discussion with the patient and/or guardian regarding: the historical points, exam findings, and any diagnostic results supporting the discharge diagnosis, lab results, radiology results, discharge instructions reviewed with patient and/or family/caregiver and understanding was verbalized  Instructions given to return to the emergency department if symptoms worsen or persist, or if there are any questions or concerns that arise at home         Amount and/or Complexity of Data Reviewed  Clinical lab tests: ordered and reviewed  Tests in the radiology section of CPT®: ordered and reviewed  Tests in the medicine section of CPT®: ordered and reviewed        Disposition  Final diagnoses:   Atypical chest pain     Time reflects when diagnosis was documented in both MDM as applicable and the Disposition within this note     Time User Action Codes Description Comment    3/23/2021  1:22 PM Rossana Bae, [id number] Lawrence Austin [[id number]] Atypical chest pain       ED Disposition     ED Disposition Condition Date/Time Comment    Discharge Stable Tue Mar 23, 2021  1:22 PM Gurpreet West discharge to home/self care  Follow-up Information     Follow up With Specialties Details Why Contact Info    Zhanna Johnson MD Internal Medicine             Patient's Medications   Discharge Prescriptions    No medications on file     No discharge procedures on file      PDMP Review     None          ED Provider  Electronically Signed by           Jacqueline Harley III, DO  03/23/21 2260

## 2021-03-30 ENCOUNTER — TELEPHONE (OUTPATIENT)
Dept: OTHER | Facility: OTHER | Age: 21
End: 2021-03-30

## 2021-03-30 NOTE — TELEPHONE ENCOUNTER
Patient called to cancel appointment schedule for today 3/30/2021  8:15 AM Dr Quique Marvin MD in [address] [place] [address] due to her car broke down and has no rides  She will call back to reschedule

## 2021-04-08 DIAGNOSIS — E61.1 IRON DEFICIENCY: ICD-10-CM

## 2021-04-08 RX ORDER — LORATADINE 10 MG
TABLET ORAL
Qty: 30 TABLET | Refills: 1 | Status: SHIPPED | OUTPATIENT
Start: 2021-04-08 | End: 2021-11-24 | Stop reason: ALTCHOICE

## 2021-04-30 ENCOUNTER — TRANSCRIBE ORDERS (OUTPATIENT)
Dept: LAB | Facility: HOSPITAL | Age: 21
End: 2021-04-30

## 2021-05-01 ENCOUNTER — TRANSCRIBE ORDERS (OUTPATIENT)
Dept: LAB | Facility: HOSPITAL | Age: 21
End: 2021-05-01

## 2021-05-01 ENCOUNTER — APPOINTMENT (OUTPATIENT)
Dept: LAB | Facility: HOSPITAL | Age: 21
End: 2021-05-01
Payer: COMMERCIAL

## 2021-05-01 DIAGNOSIS — K92.1 HEMATOCHEZIA: Primary | ICD-10-CM

## 2021-05-01 DIAGNOSIS — K51.811 OTHER ULCERATIVE COLITIS WITH RECTAL BLEEDING (HCC): ICD-10-CM

## 2021-05-01 LAB — TSH SERPL DL<=0.05 MIU/L-ACNC: 0.02 UIU/ML (ref 0.47–4.68)

## 2021-05-01 PROCEDURE — 80299 QUANTITATIVE ASSAY DRUG: CPT

## 2021-05-01 PROCEDURE — 36415 COLL VENOUS BLD VENIPUNCTURE: CPT | Performed by: INTERNAL MEDICINE

## 2021-05-01 PROCEDURE — 84439 ASSAY OF FREE THYROXINE: CPT | Performed by: INTERNAL MEDICINE

## 2021-05-01 PROCEDURE — 84443 ASSAY THYROID STIM HORMONE: CPT | Performed by: INTERNAL MEDICINE

## 2021-05-01 PROCEDURE — 87505 NFCT AGENT DETECTION GI: CPT

## 2021-05-01 PROCEDURE — 82397 CHEMILUMINESCENT ASSAY: CPT

## 2021-05-02 LAB
C DIFF TOX B TCDB STL QL NAA+PROBE: NEGATIVE
CAMPYLOBACTER DNA SPEC NAA+PROBE: NORMAL
SALMONELLA DNA SPEC QL NAA+PROBE: NORMAL
SHIGA TOXIN STX GENE SPEC NAA+PROBE: NORMAL
SHIGELLA DNA SPEC QL NAA+PROBE: NORMAL
T4 FREE SERPL-MCNC: 1.13 NG/DL (ref 0.76–1.46)

## 2021-05-03 ENCOUNTER — TELEPHONE (OUTPATIENT)
Dept: FAMILY MEDICINE CLINIC | Facility: CLINIC | Age: 21
End: 2021-05-03

## 2021-05-03 NOTE — TELEPHONE ENCOUNTER
Patient called her thyroid test came back low and wants to know what to do? She saw them in my-chart and her sister is RN and told her to call you to discuss  Second, needs note from PCP stating chronic conditions Chron's, Colitis, IBD for financial aide and resources  Please generate and let her know

## 2021-05-04 DIAGNOSIS — E05.90 SUBCLINICAL HYPERTHYROIDISM: Primary | ICD-10-CM

## 2021-05-04 NOTE — TELEPHONE ENCOUNTER
Patient made aware she will get ultrasound and make apt endocrine, central scheduling number given to arrange appts

## 2021-05-07 ENCOUNTER — HOSPITAL ENCOUNTER (OUTPATIENT)
Dept: ULTRASOUND IMAGING | Facility: HOSPITAL | Age: 21
Discharge: HOME | End: 2021-05-07
Payer: COMMERCIAL

## 2021-05-07 DIAGNOSIS — E05.90 SUBCLINICAL HYPERTHYROIDISM: ICD-10-CM

## 2021-05-07 PROCEDURE — 76536 US EXAM OF HEAD AND NECK: CPT

## 2021-05-12 ENCOUNTER — TELEPHONE (OUTPATIENT)
Dept: FAMILY MEDICINE CLINIC | Facility: CLINIC | Age: 21
End: 2021-05-12

## 2021-06-17 DIAGNOSIS — N39.0 RECURRENT URINARY TRACT INFECTION: Primary | ICD-10-CM

## 2021-07-08 ENCOUNTER — TELEPHONE (OUTPATIENT)
Dept: FAMILY MEDICINE CLINIC | Facility: CLINIC | Age: 21
End: 2021-07-08

## 2021-07-15 ENCOUNTER — OFFICE VISIT (OUTPATIENT)
Dept: FAMILY MEDICINE CLINIC | Facility: CLINIC | Age: 21
End: 2021-07-15
Payer: COMMERCIAL

## 2021-07-15 VITALS
SYSTOLIC BLOOD PRESSURE: 120 MMHG | BODY MASS INDEX: 41.02 KG/M2 | WEIGHT: 246.2 LBS | TEMPERATURE: 96.4 F | DIASTOLIC BLOOD PRESSURE: 84 MMHG | HEIGHT: 65 IN

## 2021-07-15 DIAGNOSIS — R51.0 POSTURAL HEADACHE: ICD-10-CM

## 2021-07-15 DIAGNOSIS — R42 DIZZINESS: ICD-10-CM

## 2021-07-15 DIAGNOSIS — R51.9 SHARP HEADACHE: Primary | ICD-10-CM

## 2021-07-15 PROCEDURE — 99213 OFFICE O/P EST LOW 20 MIN: CPT | Performed by: INTERNAL MEDICINE

## 2021-07-15 RX ORDER — ACETAMINOPHEN 325 MG/1
650 TABLET ORAL EVERY 6 HOURS PRN
COMMUNITY
End: 2021-11-24 | Stop reason: ALTCHOICE

## 2021-07-15 NOTE — PROGRESS NOTES
Assessment/Plan:         Diagnoses and all orders for this visit:    Sharp headache  -     Ambulatory referral to Neurology; Future    Postural headache  -     Ambulatory referral to Neurology; Future    Dizziness  -     Ambulatory referral to Neurology; Future    Other orders  -     acetaminophen (TYLENOL) 325 mg tablet; Take 650 mg by mouth every 6 (six) hours as needed        Subjective:      Patient ID: Lucia Munguia is a 23 y o  female  HPI  Patient is here for acute visit complaining of dizziness  She has spent intermittently episodes of dizziness and mild lightheadedness  She does admit that she has not been sleeping well  We discuss getting sleep study would patient wants to improve it on her own 1st   Denies any vision changes up-to-date with an eye exam   Denies any sinus issues  Denies any nausea vomiting focal weakness  No history of seizures  Negative concerning family history  I would make referral for neurology  Good sleep hygiene discussed     The following portions of the patient's history were reviewed and updated as appropriate: allergies, current medications, past family history, past medical history, past social history, past surgical history and problem list     Review of Systems      Objective:      /84 (BP Location: Right arm, Patient Position: Sitting, Cuff Size: Standard)   Temp (!) 96 4 °F (35 8 °C) (Tympanic)   Ht 5' 5" (1 651 m)   Wt 112 kg (246 lb 3 2 oz)   LMP 06/22/2021 (Exact Date)   BMI 40 97 kg/m²          Physical Exam  Neurological:      General: No focal deficit present  Mental Status: She is alert and oriented to person, place, and time  Cranial Nerves: No cranial nerve deficit  Motor: No weakness        Coordination: Coordination normal       Gait: Gait normal

## 2021-07-16 ENCOUNTER — APPOINTMENT (OUTPATIENT)
Dept: LAB | Facility: HOSPITAL | Age: 21
End: 2021-07-16
Payer: COMMERCIAL

## 2021-07-16 DIAGNOSIS — K51.811 OTHER ULCERATIVE COLITIS WITH RECTAL BLEEDING (HCC): ICD-10-CM

## 2021-07-16 LAB
25(OH)D3 SERPL-MCNC: 36.6 NG/ML (ref 30–100)
ALBUMIN SERPL BCP-MCNC: 4.2 G/DL (ref 3–5.2)
ALP SERPL-CCNC: 66 U/L (ref 43–122)
ALT SERPL W P-5'-P-CCNC: 25 U/L
ANION GAP SERPL CALCULATED.3IONS-SCNC: 9 MMOL/L (ref 5–14)
AST SERPL W P-5'-P-CCNC: 23 U/L (ref 14–36)
BASOPHILS # BLD AUTO: 0.1 THOUSANDS/ΜL (ref 0–0.1)
BASOPHILS NFR BLD AUTO: 1 % (ref 0–1)
BILIRUB SERPL-MCNC: 0.47 MG/DL
BUN SERPL-MCNC: 18 MG/DL (ref 5–25)
CALCIUM SERPL-MCNC: 9.8 MG/DL (ref 8.4–10.2)
CHLORIDE SERPL-SCNC: 103 MMOL/L (ref 97–108)
CO2 SERPL-SCNC: 27 MMOL/L (ref 22–30)
CREAT SERPL-MCNC: 0.75 MG/DL (ref 0.6–1.2)
CRP SERPL QL: 28.1 MG/L
EOSINOPHIL # BLD AUTO: 0.2 THOUSAND/ΜL (ref 0–0.4)
EOSINOPHIL NFR BLD AUTO: 2 % (ref 0–6)
ERYTHROCYTE [DISTWIDTH] IN BLOOD BY AUTOMATED COUNT: 13 %
FERRITIN SERPL-MCNC: 28 NG/ML (ref 8–388)
GFR SERPL CREATININE-BSD FRML MDRD: 114 ML/MIN/1.73SQ M
GLUCOSE P FAST SERPL-MCNC: 98 MG/DL (ref 70–99)
HCT VFR BLD AUTO: 38.3 % (ref 36–46)
HGB BLD-MCNC: 12.9 G/DL (ref 12–16)
IRON SATN MFR SERPL: 9 %
IRON SERPL-MCNC: 32 UG/DL (ref 50–170)
LYMPHOCYTES # BLD AUTO: 2 THOUSANDS/ΜL (ref 0.5–4)
LYMPHOCYTES NFR BLD AUTO: 18 % (ref 25–45)
MCH RBC QN AUTO: 29.3 PG (ref 26–34)
MCHC RBC AUTO-ENTMCNC: 33.8 G/DL (ref 31–36)
MCV RBC AUTO: 87 FL (ref 80–100)
MONOCYTES # BLD AUTO: 0.7 THOUSAND/ΜL (ref 0.2–0.9)
MONOCYTES NFR BLD AUTO: 7 % (ref 1–10)
NEUTROPHILS # BLD AUTO: 8 THOUSANDS/ΜL (ref 1.8–7.8)
NEUTS SEG NFR BLD AUTO: 72 % (ref 45–65)
PLATELET # BLD AUTO: 353 THOUSANDS/UL (ref 150–450)
PMV BLD AUTO: 8.4 FL (ref 8.9–12.7)
POTASSIUM SERPL-SCNC: 4.3 MMOL/L (ref 3.6–5)
PROT SERPL-MCNC: 7.7 G/DL (ref 5.9–8.4)
RBC # BLD AUTO: 4.41 MILLION/UL (ref 4–5.2)
SODIUM SERPL-SCNC: 139 MMOL/L (ref 137–147)
TIBC SERPL-MCNC: 367 UG/DL (ref 250–450)
WBC # BLD AUTO: 11.1 THOUSAND/UL (ref 4.5–11)

## 2021-07-16 PROCEDURE — 85025 COMPLETE CBC W/AUTO DIFF WBC: CPT

## 2021-07-16 PROCEDURE — 80053 COMPREHEN METABOLIC PANEL: CPT

## 2021-07-16 PROCEDURE — 83540 ASSAY OF IRON: CPT

## 2021-07-16 PROCEDURE — 82306 VITAMIN D 25 HYDROXY: CPT

## 2021-07-16 PROCEDURE — 83550 IRON BINDING TEST: CPT

## 2021-07-16 PROCEDURE — 36415 COLL VENOUS BLD VENIPUNCTURE: CPT

## 2021-07-16 PROCEDURE — 82728 ASSAY OF FERRITIN: CPT

## 2021-07-16 PROCEDURE — 86140 C-REACTIVE PROTEIN: CPT

## 2021-07-28 ENCOUNTER — OFFICE VISIT (OUTPATIENT)
Dept: UROLOGY | Facility: AMBULATORY SURGERY CENTER | Age: 21
End: 2021-07-28
Payer: COMMERCIAL

## 2021-07-28 VITALS
HEART RATE: 97 BPM | WEIGHT: 246 LBS | BODY MASS INDEX: 40.98 KG/M2 | SYSTOLIC BLOOD PRESSURE: 118 MMHG | HEIGHT: 65 IN | DIASTOLIC BLOOD PRESSURE: 60 MMHG

## 2021-07-28 DIAGNOSIS — N32.81 OAB (OVERACTIVE BLADDER): Primary | ICD-10-CM

## 2021-07-28 DIAGNOSIS — N39.0 RECURRENT URINARY TRACT INFECTION: ICD-10-CM

## 2021-07-28 PROCEDURE — 99203 OFFICE O/P NEW LOW 30 MIN: CPT | Performed by: NURSE PRACTITIONER

## 2021-07-28 PROCEDURE — 4004F PT TOBACCO SCREEN RCVD TLK: CPT | Performed by: NURSE PRACTITIONER

## 2021-07-28 PROCEDURE — 3008F BODY MASS INDEX DOCD: CPT | Performed by: NURSE PRACTITIONER

## 2021-07-28 RX ORDER — OXYBUTYNIN CHLORIDE 10 MG/1
10 TABLET, EXTENDED RELEASE ORAL
Qty: 30 TABLET | Refills: 3 | Status: SHIPPED | OUTPATIENT
Start: 2021-07-28 | End: 2021-11-15 | Stop reason: SDUPTHER

## 2021-07-28 NOTE — PROGRESS NOTES
07/28/21    Colen Single   2000   [id number]     Assessment  1 OAB    Discussion/Plan  1 OAB   Urine dip in office: patient has menses   5/13/2020 Renal US: normal    We discussed recommendations of timed voiding, daily low-dose Vitamin C, hydration with water up to 64 oz daily, avoidance of bladder irritants, proper hygiene, avoiding harsh soaps or products, daily probiotic, breathable cotton underwear, voiding immediately before and after sexual activity, cranberry supplements   Trial Oxybutynin 10 mg - we reviewed mechanism of action and side effect profile of medication   Referral to pelvic floor PT deferred by patient at this time    Patient will return in 8 weeks to review efficacy of medication  All questions answered at this time  Subjective  HPI   Bella Egan is a 23year old female who presents to the office today in consultation with reports of overactive bladder symptoms  She reports urinary frequency and nocturia which has become disruptive to her daily life  She gets up 4 times per night to urinate small amounts  She consumes 1 gallon of water daily and vitamin water  Per documentation, there are three urine cultures with mixed contaminants in her history  She feels her symptoms are overactive bladder symptoms rather than urinary tract symptoms  She denies constipation  She has tried over the [place] and practices Kegel exercises  She is a nonsmoker  Denies consumption of coffee, tea, soda, alcohol  She denies personal/family history of nephrolithiasis  No family history of  cancers    PMH: Crohn's, IBD, hidradenitis suppurativa, obesity    Review of Systems - History obtained from chart review and the patient  General ROS: negative  Psychological ROS: negative  Ophthalmic ROS: negative  ENT ROS: negative  Allergy and Immunology ROS: negative  Breast ROS: negative for breast lumps  Respiratory ROS: no cough, shortness of breath, or wheezing  Cardiovascular ROS: no chest pain or dyspnea on exertion  Gastrointestinal ROS: negative  Genito-Urinary ROS: positive for - nocturia and urinary frequency/urgency  Musculoskeletal ROS: negative  Neurological ROS: negative  Dermatological ROS: negative       Objective  Physical Exam  Vitals and nursing note reviewed  Constitutional:       General: She is not in acute distress  Appearance: Normal appearance  She is obese  She is not ill-appearing, toxic-appearing or diaphoretic  HENT:      Head: Normocephalic and atraumatic  Pulmonary:      Effort: Pulmonary effort is normal  No respiratory distress  Musculoskeletal:         General: Normal range of motion  Cervical back: Normal range of motion  Skin:     General: Skin is warm and dry  Neurological:      General: No focal deficit present  Mental Status: She is alert and oriented to person, place, and time  Psychiatric:         Mood and Affect: Mood normal          Behavior: Behavior normal          Thought Content: Thought content normal          Judgment: Judgment normal                RENAL ULTRASOUND 5/13/2020     INDICATION:   N30 00: Acute cystitis without hematuria  N12: Tubulo-interstitial nephritis, not specified as acute or chronic      COMPARISON: None     TECHNIQUE:   Ultrasound of the retroperitoneum was performed with a curvilinear transducer utilizing volumetric sweeps and still imaging techniques       FINDINGS:     KIDNEYS:  Symmetric and normal size  Right kidney:  10 7 x 3 4 cm  Left kidney:  10 6 x 5 2 cm      Right kidney  Normal echogenicity and contour  No suspicious masses detected  No hydronephrosis  No shadowing calculi  No perinephric fluid collections      Left kidney  Normal echogenicity and contour  No suspicious masses detected  No hydronephrosis  No shadowing calculi  No perinephric fluid collections      URETERS:  Nonvisualized      BLADDER:   Normally distended    No focal thickening or mass lesions         IMPRESSION:     Normal  Hans Mendoza

## 2021-08-19 ENCOUNTER — TELEPHONE (OUTPATIENT)
Dept: OBGYN CLINIC | Facility: CLINIC | Age: 21
End: 2021-08-19

## 2021-08-19 NOTE — TELEPHONE ENCOUNTER
Pt called stating she will be stopping her birth control and would like to inform the provider of her decision  States she just does not want to take them

## 2021-09-22 ENCOUNTER — TELEPHONE (OUTPATIENT)
Dept: FAMILY MEDICINE CLINIC | Facility: CLINIC | Age: 21
End: 2021-09-22

## 2021-09-22 NOTE — TELEPHONE ENCOUNTER
Adrienne Cooley called requesting lab work  She said that she is extremely fatigued and her body is tired all the time that is affecting her daily routine  Please advise, thank you!

## 2021-09-25 ENCOUNTER — APPOINTMENT (OUTPATIENT)
Dept: LAB | Facility: HOSPITAL | Age: 21
End: 2021-09-25
Payer: COMMERCIAL

## 2021-10-20 ENCOUNTER — TELEPHONE (OUTPATIENT)
Dept: FAMILY MEDICINE CLINIC | Facility: CLINIC | Age: 21
End: 2021-10-20

## 2021-10-27 ENCOUNTER — TELEPHONE (OUTPATIENT)
Dept: FAMILY MEDICINE CLINIC | Facility: CLINIC | Age: 21
End: 2021-10-27

## 2021-10-29 ENCOUNTER — TELEPHONE (OUTPATIENT)
Dept: FAMILY MEDICINE CLINIC | Facility: CLINIC | Age: 21
End: 2021-10-29

## 2021-11-12 ENCOUNTER — TELEPHONE (OUTPATIENT)
Dept: FAMILY MEDICINE CLINIC | Facility: CLINIC | Age: 21
End: 2021-11-12

## 2021-11-15 DIAGNOSIS — N32.81 OAB (OVERACTIVE BLADDER): ICD-10-CM

## 2021-11-15 RX ORDER — OXYBUTYNIN CHLORIDE 10 MG/1
10 TABLET, EXTENDED RELEASE ORAL
Qty: 30 TABLET | Refills: 0 | Status: SHIPPED | OUTPATIENT
Start: 2021-11-15 | End: 2021-12-29 | Stop reason: SDUPTHER

## 2021-11-24 ENCOUNTER — OFFICE VISIT (OUTPATIENT)
Dept: FAMILY MEDICINE CLINIC | Facility: CLINIC | Age: 21
End: 2021-11-24
Payer: COMMERCIAL

## 2021-11-24 VITALS
TEMPERATURE: 99.6 F | BODY MASS INDEX: 40.15 KG/M2 | SYSTOLIC BLOOD PRESSURE: 100 MMHG | DIASTOLIC BLOOD PRESSURE: 60 MMHG | HEART RATE: 89 BPM | OXYGEN SATURATION: 98 % | WEIGHT: 241 LBS | HEIGHT: 65 IN

## 2021-11-24 DIAGNOSIS — Z00.01 ENCOUNTER FOR GENERAL ADULT MEDICAL EXAMINATION WITH ABNORMAL FINDINGS: Primary | ICD-10-CM

## 2021-11-24 DIAGNOSIS — Z83.3 FAMILY HISTORY OF DIABETES MELLITUS: ICD-10-CM

## 2021-11-24 DIAGNOSIS — E66.01 MORBID OBESITY (HCC): ICD-10-CM

## 2021-11-24 PROBLEM — R73.01 IFG (IMPAIRED FASTING GLUCOSE): Status: ACTIVE | Noted: 2021-11-24

## 2021-11-24 PROBLEM — L70.0 ACNE VULGARIS: Status: ACTIVE | Noted: 2021-11-24

## 2021-11-24 PROBLEM — D50.9 IRON DEFICIENCY ANEMIA: Status: ACTIVE | Noted: 2021-11-24

## 2021-11-24 PROBLEM — E55.9 VITAMIN D DEFICIENCY: Status: ACTIVE | Noted: 2021-11-24

## 2021-11-24 PROBLEM — N32.81 OVERACTIVE BLADDER: Status: ACTIVE | Noted: 2021-11-24

## 2021-11-24 PROCEDURE — 3008F BODY MASS INDEX DOCD: CPT | Performed by: FAMILY MEDICINE

## 2021-11-24 PROCEDURE — 99385 PREV VISIT NEW AGE 18-39: CPT | Performed by: FAMILY MEDICINE

## 2021-11-24 PROCEDURE — 3725F SCREEN DEPRESSION PERFORMED: CPT | Performed by: FAMILY MEDICINE

## 2021-11-24 PROCEDURE — 1036F TOBACCO NON-USER: CPT | Performed by: FAMILY MEDICINE

## 2021-11-24 RX ORDER — PHENTERMINE HYDROCHLORIDE 37.5 MG/1
37.5 TABLET ORAL
COMMUNITY
End: 2022-06-01

## 2021-11-24 RX ORDER — TOPIRAMATE 25 MG/1
TABLET ORAL
COMMUNITY
Start: 2021-11-15 | End: 2022-06-01

## 2021-12-08 ENCOUNTER — OFFICE VISIT (OUTPATIENT)
Dept: FAMILY MEDICINE CLINIC | Facility: CLINIC | Age: 21
End: 2021-12-08
Payer: COMMERCIAL

## 2021-12-08 ENCOUNTER — APPOINTMENT (OUTPATIENT)
Dept: LAB | Facility: HOSPITAL | Age: 21
End: 2021-12-08
Payer: COMMERCIAL

## 2021-12-08 VITALS
DIASTOLIC BLOOD PRESSURE: 80 MMHG | HEIGHT: 64 IN | OXYGEN SATURATION: 99 % | WEIGHT: 233 LBS | TEMPERATURE: 101.3 F | SYSTOLIC BLOOD PRESSURE: 120 MMHG | BODY MASS INDEX: 39.78 KG/M2 | HEART RATE: 118 BPM

## 2021-12-08 DIAGNOSIS — R19.7 DIARRHEA, UNSPECIFIED TYPE: ICD-10-CM

## 2021-12-08 DIAGNOSIS — R79.82 ELEVATED C-REACTIVE PROTEIN (CRP): ICD-10-CM

## 2021-12-08 DIAGNOSIS — K50.10 CROHN'S DISEASE OF LARGE INTESTINE WITHOUT COMPLICATION (HCC): ICD-10-CM

## 2021-12-08 DIAGNOSIS — R10.84 ABDOMINAL PAIN, GENERALIZED: ICD-10-CM

## 2021-12-08 DIAGNOSIS — R50.9 FEVER, UNSPECIFIED FEVER CAUSE: Primary | ICD-10-CM

## 2021-12-08 DIAGNOSIS — R11.0 NAUSEA: ICD-10-CM

## 2021-12-08 PROCEDURE — 3725F SCREEN DEPRESSION PERFORMED: CPT | Performed by: FAMILY MEDICINE

## 2021-12-08 PROCEDURE — 87493 C DIFF AMPLIFIED PROBE: CPT

## 2021-12-08 PROCEDURE — 3008F BODY MASS INDEX DOCD: CPT | Performed by: NURSE PRACTITIONER

## 2021-12-08 PROCEDURE — 87505 NFCT AGENT DETECTION GI: CPT

## 2021-12-08 PROCEDURE — 87015 SPECIMEN INFECT AGNT CONCNTJ: CPT

## 2021-12-08 PROCEDURE — 87177 OVA AND PARASITES SMEARS: CPT

## 2021-12-08 PROCEDURE — 0241U HB NFCT DS VIR RESP RNA 4 TRGT: CPT | Performed by: FAMILY MEDICINE

## 2021-12-08 PROCEDURE — 87206 SMEAR FLUORESCENT/ACID STAI: CPT

## 2021-12-08 PROCEDURE — 87209 SMEAR COMPLEX STAIN: CPT

## 2021-12-08 PROCEDURE — 99214 OFFICE O/P EST MOD 30 MIN: CPT | Performed by: FAMILY MEDICINE

## 2021-12-09 ENCOUNTER — TELEMEDICINE (OUTPATIENT)
Dept: FAMILY MEDICINE CLINIC | Facility: CLINIC | Age: 21
End: 2021-12-09
Payer: COMMERCIAL

## 2021-12-09 ENCOUNTER — TELEPHONE (OUTPATIENT)
Dept: FAMILY MEDICINE CLINIC | Facility: CLINIC | Age: 21
End: 2021-12-09

## 2021-12-09 VITALS — TEMPERATURE: 98.4 F

## 2021-12-09 DIAGNOSIS — U07.1 COVID-19 VIRUS INFECTION: Primary | ICD-10-CM

## 2021-12-09 PROBLEM — R79.82 ELEVATED C-REACTIVE PROTEIN (CRP): Status: ACTIVE | Noted: 2021-12-09

## 2021-12-09 PROBLEM — R50.9 FEVER: Status: ACTIVE | Noted: 2021-12-09

## 2021-12-09 PROBLEM — K50.10 CROHN'S DISEASE OF LARGE INTESTINE WITHOUT COMPLICATION (HCC): Status: ACTIVE | Noted: 2020-07-16

## 2021-12-09 PROBLEM — K50.80 CROHN'S DISEASE OF BOTH SMALL AND LARGE INTESTINE WITHOUT COMPLICATION (HCC): Status: ACTIVE | Noted: 2020-07-16

## 2021-12-09 PROBLEM — R50.9 FEVER: Status: ACTIVE | Noted: 2021-12-08

## 2021-12-09 LAB
CAMPYLOBACTER DNA SPEC NAA+PROBE: NORMAL
FLUAV RNA RESP QL NAA+PROBE: NEGATIVE
FLUBV RNA RESP QL NAA+PROBE: NEGATIVE
RSV RNA RESP QL NAA+PROBE: NEGATIVE
SALMONELLA DNA SPEC QL NAA+PROBE: NORMAL
SARS-COV-2 RNA RESP QL NAA+PROBE: POSITIVE
SHIGA TOXIN STX GENE SPEC NAA+PROBE: NORMAL
SHIGELLA DNA SPEC QL NAA+PROBE: NORMAL

## 2021-12-09 PROCEDURE — 99213 OFFICE O/P EST LOW 20 MIN: CPT | Performed by: NURSE PRACTITIONER

## 2021-12-10 LAB
C DIFF TOX A+B STL QL IA: NEGATIVE
C DIFF TOX GENS STL QL NAA+PROBE: POSITIVE
CRYPTOSP STL QL ACID FAST STN: NORMAL
I BELLI SPEC QL ACID FAST MOD KINY STN: NORMAL
O+P STL CONC: NORMAL

## 2021-12-13 ENCOUNTER — TELEMEDICINE (OUTPATIENT)
Dept: FAMILY MEDICINE CLINIC | Facility: CLINIC | Age: 21
End: 2021-12-13
Payer: COMMERCIAL

## 2021-12-13 ENCOUNTER — APPOINTMENT (EMERGENCY)
Dept: RADIOLOGY | Facility: HOSPITAL | Age: 21
End: 2021-12-13
Payer: COMMERCIAL

## 2021-12-13 ENCOUNTER — HOSPITAL ENCOUNTER (EMERGENCY)
Facility: HOSPITAL | Age: 21
Discharge: HOME | End: 2021-12-13
Attending: EMERGENCY MEDICINE | Admitting: EMERGENCY MEDICINE
Payer: COMMERCIAL

## 2021-12-13 ENCOUNTER — APPOINTMENT (EMERGENCY)
Dept: CT IMAGING | Facility: HOSPITAL | Age: 21
End: 2021-12-13
Payer: COMMERCIAL

## 2021-12-13 VITALS
OXYGEN SATURATION: 100 % | TEMPERATURE: 97.5 F | DIASTOLIC BLOOD PRESSURE: 75 MMHG | HEART RATE: 76 BPM | WEIGHT: 233 LBS | SYSTOLIC BLOOD PRESSURE: 140 MMHG | RESPIRATION RATE: 18 BRPM | BODY MASS INDEX: 39.99 KG/M2

## 2021-12-13 VITALS — TEMPERATURE: 97.3 F

## 2021-12-13 DIAGNOSIS — A49.8 CLOSTRIDIUM DIFFICILE INFECTION: ICD-10-CM

## 2021-12-13 DIAGNOSIS — K50.10 CROHN'S DISEASE OF LARGE INTESTINE WITHOUT COMPLICATION (HCC): ICD-10-CM

## 2021-12-13 DIAGNOSIS — R55 SYNCOPE: Primary | ICD-10-CM

## 2021-12-13 DIAGNOSIS — U07.1 COVID-19 VIRUS INFECTION: Primary | ICD-10-CM

## 2021-12-13 DIAGNOSIS — N39.0 UTI (URINARY TRACT INFECTION): ICD-10-CM

## 2021-12-13 PROBLEM — K50.90 EXACERBATION OF CROHN'S DISEASE (HCC): Status: ACTIVE | Noted: 2017-09-06

## 2021-12-13 LAB
ALBUMIN SERPL BCP-MCNC: 4.1 G/DL (ref 3–5.2)
ALP SERPL-CCNC: 81 U/L (ref 43–122)
ALT SERPL W P-5'-P-CCNC: 147 U/L
ANION GAP SERPL CALCULATED.3IONS-SCNC: 7 MMOL/L (ref 5–14)
AST SERPL W P-5'-P-CCNC: 83 U/L (ref 14–36)
ATRIAL RATE: 80 BPM
BACTERIA UR QL AUTO: ABNORMAL /HPF
BILIRUB SERPL-MCNC: 0.37 MG/DL
BILIRUB UR QL STRIP: NEGATIVE
BUN SERPL-MCNC: 8 MG/DL (ref 5–25)
CALCIUM SERPL-MCNC: 8.9 MG/DL (ref 8.4–10.2)
CARDIAC TROPONIN I PNL SERPL HS: <2 NG/L
CHLORIDE SERPL-SCNC: 104 MMOL/L (ref 97–108)
CLARITY UR: ABNORMAL
CO2 SERPL-SCNC: 27 MMOL/L (ref 22–30)
COLOR UR: ABNORMAL
CREAT SERPL-MCNC: 0.8 MG/DL (ref 0.6–1.2)
EOSINOPHIL # BLD AUTO: 0.21 THOUSAND/UL (ref 0–0.4)
EOSINOPHIL NFR BLD MANUAL: 5 % (ref 0–6)
ERYTHROCYTE [DISTWIDTH] IN BLOOD BY AUTOMATED COUNT: 13.2 %
EXT PREG TEST URINE: NEGATIVE
EXT. CONTROL ED NAV: NORMAL
GFR SERPL CREATININE-BSD FRML MDRD: 104 ML/MIN/1.73SQ M
GLUCOSE SERPL-MCNC: 115 MG/DL (ref 70–99)
GLUCOSE UR STRIP-MCNC: NEGATIVE MG/DL
HCT VFR BLD AUTO: 45 % (ref 36–46)
HGB BLD-MCNC: 14.8 G/DL (ref 12–16)
HGB UR QL STRIP.AUTO: 25
KETONES UR STRIP-MCNC: ABNORMAL MG/DL
LEUKOCYTE ESTERASE UR QL STRIP: 100
LYMPHOCYTES # BLD AUTO: 1.97 THOUSAND/UL (ref 0.5–4)
LYMPHOCYTES # BLD AUTO: 47 % (ref 25–45)
MCH RBC QN AUTO: 28.3 PG (ref 26–34)
MCHC RBC AUTO-ENTMCNC: 33 G/DL (ref 31–36)
MCV RBC AUTO: 86 FL (ref 80–100)
MONOCYTES # BLD AUTO: 0.59 THOUSAND/UL (ref 0.2–0.9)
MONOCYTES NFR BLD AUTO: 14 % (ref 1–10)
MUCOUS THREADS UR QL AUTO: ABNORMAL
NEUTS BAND NFR BLD MANUAL: 4 % (ref 0–8)
NEUTS SEG # BLD: 1.43 THOUSAND/UL (ref 1.8–7.8)
NEUTS SEG NFR BLD AUTO: 30 %
NITRITE UR QL STRIP: NEGATIVE
NON-SQ EPI CELLS URNS QL MICRO: ABNORMAL /HPF
P AXIS: 53 DEGREES
PH UR STRIP.AUTO: 6 [PH]
PLATELET # BLD AUTO: 240 THOUSANDS/UL (ref 150–450)
PLATELET BLD QL SMEAR: ADEQUATE
PMV BLD AUTO: 8.5 FL (ref 8.9–12.7)
POTASSIUM SERPL-SCNC: 3.5 MMOL/L (ref 3.6–5)
PR INTERVAL: 122 MS
PROT SERPL-MCNC: 8 G/DL (ref 5.9–8.4)
PROT UR STRIP-MCNC: ABNORMAL MG/DL
QRS AXIS: 62 DEGREES
QRSD INTERVAL: 88 MS
QT INTERVAL: 386 MS
QTC INTERVAL: 445 MS
RBC # BLD AUTO: 5.24 MILLION/UL (ref 4–5.2)
RBC #/AREA URNS AUTO: ABNORMAL /HPF
RBC MORPH BLD: NORMAL
SODIUM SERPL-SCNC: 138 MMOL/L (ref 137–147)
SP GR UR STRIP.AUTO: 1.01 (ref 1–1.04)
T WAVE AXIS: 23 DEGREES
TOTAL CELLS COUNTED SPEC: 100
UROBILINOGEN UA: NEGATIVE MG/DL
VENTRICULAR RATE: 80 BPM
WBC # BLD AUTO: 4.2 THOUSAND/UL (ref 4.5–11)
WBC #/AREA URNS AUTO: ABNORMAL /HPF

## 2021-12-13 PROCEDURE — 80053 COMPREHEN METABOLIC PANEL: CPT | Performed by: EMERGENCY MEDICINE

## 2021-12-13 PROCEDURE — 36415 COLL VENOUS BLD VENIPUNCTURE: CPT | Performed by: EMERGENCY MEDICINE

## 2021-12-13 PROCEDURE — 99285 EMERGENCY DEPT VISIT HI MDM: CPT

## 2021-12-13 PROCEDURE — 96360 HYDRATION IV INFUSION INIT: CPT

## 2021-12-13 PROCEDURE — 96361 HYDRATE IV INFUSION ADD-ON: CPT

## 2021-12-13 PROCEDURE — 99285 EMERGENCY DEPT VISIT HI MDM: CPT | Performed by: EMERGENCY MEDICINE

## 2021-12-13 PROCEDURE — 85027 COMPLETE CBC AUTOMATED: CPT | Performed by: EMERGENCY MEDICINE

## 2021-12-13 PROCEDURE — 70450 CT HEAD/BRAIN W/O DYE: CPT

## 2021-12-13 PROCEDURE — 81001 URINALYSIS AUTO W/SCOPE: CPT | Performed by: EMERGENCY MEDICINE

## 2021-12-13 PROCEDURE — 93010 ELECTROCARDIOGRAM REPORT: CPT | Performed by: INTERNAL MEDICINE

## 2021-12-13 PROCEDURE — G1004 CDSM NDSC: HCPCS

## 2021-12-13 PROCEDURE — 99214 OFFICE O/P EST MOD 30 MIN: CPT | Performed by: NURSE PRACTITIONER

## 2021-12-13 PROCEDURE — 85007 BL SMEAR W/DIFF WBC COUNT: CPT | Performed by: EMERGENCY MEDICINE

## 2021-12-13 PROCEDURE — 84484 ASSAY OF TROPONIN QUANT: CPT | Performed by: EMERGENCY MEDICINE

## 2021-12-13 PROCEDURE — 71045 X-RAY EXAM CHEST 1 VIEW: CPT

## 2021-12-13 PROCEDURE — 93005 ELECTROCARDIOGRAM TRACING: CPT

## 2021-12-13 PROCEDURE — 81025 URINE PREGNANCY TEST: CPT | Performed by: EMERGENCY MEDICINE

## 2021-12-13 RX ORDER — CEPHALEXIN 500 MG/1
500 CAPSULE ORAL ONCE
Status: COMPLETED | OUTPATIENT
Start: 2021-12-13 | End: 2021-12-13

## 2021-12-13 RX ORDER — CEFUROXIME AXETIL 500 MG/1
500 TABLET ORAL EVERY 12 HOURS SCHEDULED
Qty: 14 TABLET | Refills: 0 | Status: SHIPPED | OUTPATIENT
Start: 2021-12-13 | End: 2021-12-20

## 2021-12-13 RX ORDER — VANCOMYCIN HYDROCHLORIDE 125 MG/1
125 CAPSULE ORAL 4 TIMES DAILY
Qty: 40 CAPSULE | Refills: 0 | Status: SHIPPED | OUTPATIENT
Start: 2021-12-13 | End: 2021-12-23

## 2021-12-13 RX ADMIN — SODIUM CHLORIDE 1000 ML: 0.9 INJECTION, SOLUTION INTRAVENOUS at 05:09

## 2021-12-13 RX ADMIN — CEPHALEXIN 500 MG: 500 CAPSULE ORAL at 06:26

## 2021-12-14 PROBLEM — A49.8 CLOSTRIDIUM DIFFICILE INFECTION: Status: ACTIVE | Noted: 2021-12-14

## 2021-12-15 ENCOUNTER — TELEMEDICINE (OUTPATIENT)
Dept: FAMILY MEDICINE CLINIC | Facility: CLINIC | Age: 21
End: 2021-12-15
Payer: COMMERCIAL

## 2021-12-15 DIAGNOSIS — U07.1 COVID-19 VIRUS INFECTION: Primary | ICD-10-CM

## 2021-12-15 DIAGNOSIS — A49.8 CLOSTRIDIUM DIFFICILE INFECTION: ICD-10-CM

## 2021-12-15 PROCEDURE — 1036F TOBACCO NON-USER: CPT | Performed by: NURSE PRACTITIONER

## 2021-12-15 PROCEDURE — 99213 OFFICE O/P EST LOW 20 MIN: CPT | Performed by: NURSE PRACTITIONER

## 2021-12-29 ENCOUNTER — OFFICE VISIT (OUTPATIENT)
Dept: URGENT CARE | Age: 21
End: 2021-12-29
Payer: COMMERCIAL

## 2021-12-29 ENCOUNTER — TELEPHONE (OUTPATIENT)
Dept: UROLOGY | Facility: AMBULATORY SURGERY CENTER | Age: 21
End: 2021-12-29

## 2021-12-29 VITALS
OXYGEN SATURATION: 100 % | TEMPERATURE: 98 F | HEIGHT: 64 IN | WEIGHT: 233 LBS | DIASTOLIC BLOOD PRESSURE: 72 MMHG | RESPIRATION RATE: 18 BRPM | HEART RATE: 80 BPM | SYSTOLIC BLOOD PRESSURE: 129 MMHG | BODY MASS INDEX: 39.78 KG/M2

## 2021-12-29 DIAGNOSIS — R30.0 DYSURIA: Primary | ICD-10-CM

## 2021-12-29 LAB
SL AMB  POCT GLUCOSE, UA: ABNORMAL
SL AMB LEUKOCYTE ESTERASE,UA: ABNORMAL
SL AMB POCT BILIRUBIN,UA: ABNORMAL
SL AMB POCT BLOOD,UA: ABNORMAL
SL AMB POCT CLARITY,UA: ABNORMAL
SL AMB POCT COLOR,UA: YELLOW
SL AMB POCT KETONES,UA: ABNORMAL
SL AMB POCT NITRITE,UA: ABNORMAL
SL AMB POCT PH,UA: 8
SL AMB POCT SPECIFIC GRAVITY,UA: 1
SL AMB POCT URINE PROTEIN: ABNORMAL
SL AMB POCT UROBILINOGEN: 1

## 2021-12-29 PROCEDURE — 87086 URINE CULTURE/COLONY COUNT: CPT | Performed by: PHYSICIAN ASSISTANT

## 2021-12-29 PROCEDURE — 81002 URINALYSIS NONAUTO W/O SCOPE: CPT | Performed by: PHYSICIAN ASSISTANT

## 2021-12-29 PROCEDURE — 99213 OFFICE O/P EST LOW 20 MIN: CPT | Performed by: PHYSICIAN ASSISTANT

## 2021-12-29 RX ORDER — PHENAZOPYRIDINE HYDROCHLORIDE 200 MG/1
200 TABLET, FILM COATED ORAL
Qty: 10 TABLET | Refills: 0 | Status: SHIPPED | OUTPATIENT
Start: 2021-12-29 | End: 2022-05-03 | Stop reason: ALTCHOICE

## 2021-12-29 RX ORDER — CEPHALEXIN 500 MG/1
500 CAPSULE ORAL EVERY 12 HOURS SCHEDULED
Qty: 14 CAPSULE | Refills: 0 | Status: SHIPPED | OUTPATIENT
Start: 2021-12-29 | End: 2022-01-05

## 2021-12-31 LAB — BACTERIA UR CULT: NORMAL

## 2022-01-01 ENCOUNTER — TELEPHONE (OUTPATIENT)
Dept: OTHER | Facility: OTHER | Age: 22
End: 2022-01-01

## 2022-01-01 ENCOUNTER — HOSPITAL ENCOUNTER (EMERGENCY)
Facility: HOSPITAL | Age: 22
Discharge: HOME | End: 2022-01-01
Attending: EMERGENCY MEDICINE
Payer: MEDICARE

## 2022-01-01 VITALS
TEMPERATURE: 98.2 F | RESPIRATION RATE: 18 BRPM | OXYGEN SATURATION: 98 % | WEIGHT: 230.3 LBS | HEART RATE: 90 BPM | SYSTOLIC BLOOD PRESSURE: 145 MMHG | DIASTOLIC BLOOD PRESSURE: 80 MMHG | BODY MASS INDEX: 39.53 KG/M2

## 2022-01-01 DIAGNOSIS — R35.0 URINARY FREQUENCY: ICD-10-CM

## 2022-01-01 DIAGNOSIS — R30.0 DYSURIA: Primary | ICD-10-CM

## 2022-01-01 LAB
BACTERIA UR QL AUTO: NORMAL /HPF
BILIRUB UR QL STRIP: NEGATIVE
CLARITY UR: CLEAR
COLOR UR: YELLOW
EXT PREG TEST URINE: NEGATIVE
EXT. CONTROL ED NAV: NORMAL
GLUCOSE UR STRIP-MCNC: NEGATIVE MG/DL
HGB UR QL STRIP.AUTO: 250
KETONES UR STRIP-MCNC: NEGATIVE MG/DL
LEUKOCYTE ESTERASE UR QL STRIP: NEGATIVE
NITRITE UR QL STRIP: NEGATIVE
NON-SQ EPI CELLS URNS QL MICRO: NORMAL /HPF
PH UR STRIP.AUTO: 6.5 [PH]
PROT UR STRIP-MCNC: NEGATIVE MG/DL
RBC #/AREA URNS AUTO: NORMAL /HPF
SP GR UR STRIP.AUTO: 1.01 (ref 1–1.04)
UROBILINOGEN UA: NEGATIVE MG/DL
WBC #/AREA URNS AUTO: NORMAL /HPF

## 2022-01-01 PROCEDURE — 99283 EMERGENCY DEPT VISIT LOW MDM: CPT

## 2022-01-01 PROCEDURE — 81025 URINE PREGNANCY TEST: CPT

## 2022-01-01 PROCEDURE — 81003 URINALYSIS AUTO W/O SCOPE: CPT

## 2022-01-01 PROCEDURE — 81001 URINALYSIS AUTO W/SCOPE: CPT

## 2022-01-01 RX ORDER — IBUPROFEN 400 MG/1
400 TABLET ORAL EVERY 6 HOURS PRN
Qty: 20 TABLET | Refills: 0 | Status: SHIPPED | OUTPATIENT
Start: 2022-01-01 | End: 2022-05-03 | Stop reason: ALTCHOICE

## 2022-01-01 RX ORDER — ACETAMINOPHEN 500 MG
500 TABLET ORAL EVERY 6 HOURS PRN
Qty: 30 TABLET | Refills: 0 | Status: SHIPPED | OUTPATIENT
Start: 2022-01-01

## 2022-01-01 NOTE — ED PROVIDER NOTES
History  Chief Complaint   Patient presents with    Possible UTI     dysuria and frequency, started on abx 12/28 with no relief  pt denies d/c and denies unprotected sex     49-year-old female with past medical history of Crohn's disease, recurrent UTIs, overactive bladder presents to Emergency Department complaining of worsening dysuria and urinary frequency  She was seen at an urgent care on 12/29 and prescribed Keflex and Pyridium  She has taken in 3 days plus a dose this morning of Keflex  She reports she did not take Pyridium today  She states she was most recently treated for UTI at the beginning of December  And had a prior history of UTIs  She reports that she began menstruating yesterday and that this was expected with her cycle  She denies menorrhagia  She also reports that yesterday she was feeling nauseous, but it has since resolved  She denies pelvic pain, abdominal pain, hematuria, vaginal pain, vaginal irritation/redness or swelling, vaginal discharge, vaginal lesions, rash, flank pain, fever, chills, vomiting  History provided by:  Patient and medical records   used: No        Prior to Admission Medications   Prescriptions Last Dose Informant Patient Reported? Taking?    Multiple Vitamins-Minerals (HAIR SKIN AND NAILS FORMULA PO)  Self Yes No   Sig: Take by mouth   STELARA 90 MG/ML subcutaneous injection  Self Yes No   cephalexin (KEFLEX) 500 mg capsule   No No   Sig: Take 1 capsule (500 mg total) by mouth every 12 (twelve) hours for 7 days   oxybutynin (DITROPAN-XL) 10 MG 24 hr tablet   No No   Sig: Take 1 tablet (10 mg total) by mouth daily at bedtime   phenazopyridine (PYRIDIUM) 200 mg tablet   No No   Sig: Take 1 tablet (200 mg total) by mouth 3 (three) times a day with meals   phentermine (ADIPEX-P) 37 5 MG tablet  Self Yes No   Sig: Take 37 5 mg by mouth every morning before breakfast Patient said she is taking 1/2 tablet right now   topiramate (TOPAMAX) 25 mg tablet  Self Yes No      Facility-Administered Medications: None       Past Medical History:   Diagnosis Date    [facility]'s disease ([facility] [address] )     Hidradenitis suppurativa     thighs    Obesity, Class II, BMI 35-39 9     TMJ (temporomandibular joint disorder)     last assessed 02/24/2016    Urinary tract infection     Varicella     vaccine       Past Surgical History:   Procedure Laterality Date    COLONOSCOPY      x several; last one 2/2020    WISDOM TOOTH EXTRACTION         Family History   Problem Relation Age of Onset    Diabetes Mother         Due to underlying condition with foot ulcer  Type 2 DM without complication   Diabetes Father     Polycystic ovary syndrome Sister     Stroke Neg Hx     Heart attack Neg Hx     Hypertension Neg Hx     Breast cancer Neg Hx     Colon cancer Neg Hx     Ovarian cancer Neg Hx     Uterine cancer Neg Hx      I have reviewed and agree with the history as documented  E-Cigarette/Vaping    E-Cigarette Use Never User      E-Cigarette/Vaping Substances    Nicotine No     THC No     CBD No     Flavoring No     Other No     Unknown No      Social History     Tobacco Use    Smoking status: Former Smoker    Smokeless tobacco: Never Used    Tobacco comment: hooka smoke   Vaping Use    Vaping Use: Never used   Substance Use Topics    Alcohol use: No    Drug use: No       Review of Systems   Constitutional: Negative for chills and fever  HENT: Negative for ear pain and sore throat  Eyes: Negative for pain and visual disturbance  Respiratory: Negative for cough and shortness of breath  Cardiovascular: Negative for chest pain and palpitations  Gastrointestinal: Negative for abdominal pain and vomiting  Genitourinary: Positive for dysuria, frequency and urgency  Negative for decreased urine volume, difficulty urinating, flank pain, genital sores, hematuria, menstrual problem, pelvic pain, vaginal bleeding, vaginal discharge and vaginal pain  Musculoskeletal: Negative for arthralgias and back pain  Skin: Negative for color change and rash  Neurological: Negative for seizures, syncope, weakness and numbness  Psychiatric/Behavioral: Negative for confusion  All other systems reviewed and are negative  Physical Exam  Physical Exam  Vitals and nursing note reviewed  Constitutional:       General: She is awake  She is not in acute distress  Appearance: Normal appearance  She is well-developed and well-groomed  She is not toxic-appearing or diaphoretic  HENT:      Head: Normocephalic and atraumatic  Jaw: No swelling  Right Ear: External ear normal       Left Ear: External ear normal       Mouth/Throat:      Lips: Pink  Mouth: Mucous membranes are moist    Eyes:      General: Lids are normal  Vision grossly intact  Right eye: No discharge  Left eye: No discharge  Conjunctiva/sclera: Conjunctivae normal    Cardiovascular:      Rate and Rhythm: Normal rate and regular rhythm  Heart sounds: Normal heart sounds  No murmur heard  Pulmonary:      Effort: Pulmonary effort is normal  No respiratory distress  Breath sounds: Normal breath sounds  Abdominal:      General: Abdomen is flat  There is no distension  Tenderness: There is no abdominal tenderness  There is no right CVA tenderness, left CVA tenderness, guarding or rebound  Musculoskeletal:      Cervical back: Neck supple  Right lower leg: No edema  Left lower leg: No edema  Skin:     General: Skin is warm and dry  Capillary Refill: Capillary refill takes less than 2 seconds  Coloration: Skin is not jaundiced or pale  Findings: No rash  Neurological:      Mental Status: She is alert  Gait: Gait normal    Psychiatric:         Mood and Affect: Mood normal          Behavior: Behavior normal  Behavior is cooperative  Thought Content:  Thought content normal          Vital Signs  ED Triage Vitals [01/01/22 1033]   Temperature Pulse Respirations Blood Pressure SpO2   98 2 °F (36 8 °C) 90 18 145/80 98 %      Temp Source Heart Rate Source Patient Position - Orthostatic VS BP Location FiO2 (%)   Oral Monitor Sitting Left arm --      Pain Score       --           Vitals:    01/01/22 1033   BP: 145/80   Pulse: 90   Patient Position - Orthostatic VS: Sitting         Visual Acuity      ED Medications  Medications - No data to display    Diagnostic Studies  Results Reviewed     Procedure Component Value Units Date/Time    Urine Microscopic [[id number]]  (Normal) Collected: 01/01/22 1100    Lab Status: Final result Specimen: Urine, Other Updated: 01/01/22 1132     RBC, UA 0-1 /hpf      WBC, UA 0-1 /hpf      Epithelial Cells Occasional /hpf      Bacteria, UA Occasional /hpf     UA w Reflex to Microscopic w Reflex to Culture [[id number]]  (Abnormal) Collected: 01/01/22 1100    Lab Status: Final result Specimen: Urine, Other Updated: 01/01/22 1109     Color, UA Yellow     Clarity, UA Clear     Specific Gravity, UA 1 010     pH, UA 6 5     Leukocytes, UA Negative     Nitrite, UA Negative     Protein, UA Negative mg/dl      Glucose, UA Negative mg/dl      Ketones, UA Negative mg/dl      Bilirubin, UA Negative     Blood,  0     UROBILINOGEN UA Negative mg/dL     POCT pregnancy, urine [[id number]]  (Normal) Resulted: 01/01/22 1101    Lab Status: Final result Updated: 01/01/22 1101     EXT PREG TEST UR (Ref: Negative) negative     Control valid                 No orders to display              Procedures  Procedures         ED Course  ED Course as of 01/03/22 1446   Sat Jan 01, 2022   1056 7 days of Ceftin on 12/13 for UTI  Was seen on 12/29 for 3 days of dysuria, urgency, prescribed 7 days of kelfex which she has been taking without relief of symptoms and now increased pain with urination  Did not take phenazopyridine today  Also takes oxybutynin but is currently out, has follow up with Urology on Thursday      [facility] Awaiting microscopic     1158 Epithelial Cells: Occasional   1158 Bacteria, UA: Occasional   1158 WBC, UA: 0-1   1201 Discussed with attending will await culture    1204 Oxybutynin was refilled by urology  Discussed with attending will not give pyridium since she already did 3 days  Will prescribe motrin, tylenol  MDM  Number of Diagnoses or Management Options  Dysuria  Urinary frequency  Diagnosis management comments: 49-year-old female with past medical history of overactive bladder, recurrent UTIs presents to emergency department complaining of continued dysuria, urinary frequency after being placed on Keflex for UTI 3 days prior  She had also taken 3 days of Pyridium  She also takes oxybutynin for the overactive bladder and that was recently refilled by her urologist   Chart review also revealed that she is treated with Ceftin at the beginning of the month on 12/13 for UTI  She has appointment with them on Thursday  Patient denies any sexual activity or risks of sexually transmitted diseases through shared decision-making will not test today  Previous culture revealed mixed contaminants  No sensitivities were susceptibilities found in chart review  UA reflex to culture repeated  No acute findings of UTI noted on UA  Discussed with attending, will not change antibiotics at this time due to lack of white blood cells or a nitrites or bacteria on UA, will wait culture and sensitivities  Because she really did 3 days of Pyridium will not be prescribed at this time  Will have her follow up with urologist, return to emergency department for new or worsening symptoms,  Discussed signs and symptoms of pyelonephritis  Will prescribe Motrin Tylenol for pain control  Work note given  All imaging and/or lab testing discussed with patient, strict return to ED precautions discussed  Patient recommended to follow up promptly with appropriate outpatient provider  Patient and/or family members verbalizes understanding and agrees with plan  Patient and/or family members were given opportunity to ask questions, all questions were answered at this time  Patient is stable for discharge      Portions of the record may have been created with voice recognition software  Occasional wrong word or "sound a like" substitutions may have occurred due to the inherent limitations of voice recognition software  Read the chart carefully and recognize, using context, where substitutions have occurred  Amount and/or Complexity of Data Reviewed  Clinical lab tests: ordered and reviewed  Decide to obtain previous medical records or to obtain history from someone other than the patient: yes  Review and summarize past medical records: yes  Discuss the patient with other providers: yes (Dr Shu Stringer )        Disposition  Final diagnoses:   Dysuria   Urinary frequency     Time reflects when diagnosis was documented in both MDM as applicable and the Disposition within this note     Time User Action Codes Description Comment    1/1/2022 12:05 PM Lakeisha Lara Add [R30 0] Dysuria     1/1/2022 12:05 PM Lakeisha Lara Add [R35 0] Urinary frequency       ED Disposition     ED Disposition Condition Date/Time Comment    Discharge Stable Sat Jan 1, 2022 12:01 PM Romi Mejia discharge to home/self care              Follow-up Information     Follow up With Specialties Details Why Contact Info Additional [address] [facility] Schedule an appointment as soon as possible for a visit  For follow up regarding your symptoms [phone] [place] [phone]  [phone]  [facility], [address], [address] [place] [address] [address]          Discharge Medication List as of 1/1/2022 12:07 PM      START taking these medications    Details   acetaminophen (TYLENOL) 500 mg tablet Take 1 tablet (500 mg total) by mouth every 6 (six) hours as needed (pain), Starting Sat 1/1/2022, Normal      ibuprofen (MOTRIN) 400 mg tablet Take 1 tablet (400 mg total) by mouth every 6 (six) hours as needed for moderate pain, Starting Sat 1/1/2022, Normal         CONTINUE these medications which have NOT CHANGED    Details   cephalexin (KEFLEX) 500 mg capsule Take 1 capsule (500 mg total) by mouth every 12 (twelve) hours for 7 days, Starting Wed 12/29/2021, Until Wed 1/5/2022, Normal      Multiple Vitamins-Minerals (HAIR SKIN AND NAILS FORMULA PO) Take by mouth, Historical Med      oxybutynin (DITROPAN-XL) 10 MG 24 hr tablet Take 1 tablet (10 mg total) by mouth daily at bedtime, Starting Wed 12/29/2021, Normal      phenazopyridine (PYRIDIUM) 200 mg tablet Take 1 tablet (200 mg total) by mouth 3 (three) times a day with meals, Starting Wed 12/29/2021, Normal      phentermine (ADIPEX-P) 37 5 MG tablet Take 37 5 mg by mouth every morning before breakfast Patient said she is taking 1/2 tablet right now, Historical Med      STELARA 90 MG/ML subcutaneous injection Starting Thu 5/7/2020, Historical Med      topiramate (TOPAMAX) 25 mg tablet Starting Mon 11/15/2021, Historical Med             No discharge procedures on file      PDMP Review     None          ED Provider  Electronically Signed by           Anders Huff PA-C  01/03/22 1383

## 2022-01-01 NOTE — Clinical Note
Kimberly Cage was seen and treated in our emergency department on 1/1/2022  Diagnosis:     Mark Rincon    She may return on this date: 01/04/2022         If you have any questions or concerns, please don't hesitate to call        Jacobo Mays PA-C    ______________________________           _______________          _______________  Hospital Representative                              Date                                Time

## 2022-01-01 NOTE — TELEPHONE ENCOUNTER
Pt called, requested a call back from office at their best convenience to schedule a sooner appointment then existing appointment

## 2022-01-01 NOTE — DISCHARGE INSTRUCTIONS
Follow-up with Urology regarding her symptoms  If any significant findings from urine culture we will call you back when the results come back  Take Motrin, Tylenol as needed for pain  Return to emergency department for new or worsening symptoms as discussed

## 2022-01-03 NOTE — TELEPHONE ENCOUNTER
Called pt, pt now has to work this Thursday also still in a lot of pain  She has been to the Urgent care twice now since 12/28 and they recommend she gets in asap  I was able to schedule her tomorrow, 1/4/22, karen/ Camron Villavicencio for 2:45 in [facility]

## 2022-01-04 ENCOUNTER — OFFICE VISIT (OUTPATIENT)
Dept: UROLOGY | Facility: AMBULATORY SURGERY CENTER | Age: 22
End: 2022-01-04
Payer: MEDICARE

## 2022-01-04 VITALS
HEIGHT: 64 IN | HEART RATE: 80 BPM | BODY MASS INDEX: 38.58 KG/M2 | WEIGHT: 226 LBS | DIASTOLIC BLOOD PRESSURE: 82 MMHG | SYSTOLIC BLOOD PRESSURE: 122 MMHG

## 2022-01-04 DIAGNOSIS — N32.81 OAB (OVERACTIVE BLADDER): Primary | ICD-10-CM

## 2022-01-04 DIAGNOSIS — R10.2 SUPRAPUBIC PAIN: ICD-10-CM

## 2022-01-04 LAB
POST-VOID RESIDUAL VOLUME, ML POC: 54 ML
SL AMB  POCT GLUCOSE, UA: NORMAL
SL AMB LEUKOCYTE ESTERASE,UA: NORMAL
SL AMB POCT BILIRUBIN,UA: NORMAL
SL AMB POCT BLOOD,UA: NORMAL
SL AMB POCT CLARITY,UA: CLEAR
SL AMB POCT COLOR,UA: YELLOW
SL AMB POCT KETONES,UA: NORMAL
SL AMB POCT NITRITE,UA: NORMAL
SL AMB POCT PH,UA: 7
SL AMB POCT SPECIFIC GRAVITY,UA: 1
SL AMB POCT URINE PROTEIN: NORMAL
SL AMB POCT UROBILINOGEN: 0.2

## 2022-01-04 PROCEDURE — 51798 US URINE CAPACITY MEASURE: CPT | Performed by: NURSE PRACTITIONER

## 2022-01-04 PROCEDURE — 99214 OFFICE O/P EST MOD 30 MIN: CPT | Performed by: NURSE PRACTITIONER

## 2022-01-04 PROCEDURE — 3008F BODY MASS INDEX DOCD: CPT | Performed by: NURSE PRACTITIONER

## 2022-01-04 PROCEDURE — 81002 URINALYSIS NONAUTO W/O SCOPE: CPT | Performed by: NURSE PRACTITIONER

## 2022-01-04 NOTE — PATIENT INSTRUCTIONS
Continue Oxybutynin  Start Myrbetriq  Schedule Urodynamics     Kegel Exercises for Women   AMBULATORY CARE:   Kegel exercises  help strengthen your pelvic muscles  Pelvic muscles hold your pelvic organs, such as your bladder and uterus, in place  Kegel exercises help prevent or control problems with urine incontinence (leakage)  Incontinence may be caused by pregnancy, childbirth, or menopause  Contact your healthcare provider if:   · You cannot feel your muscles tighten or relax  · You continue to leak urine  · You have questions or concerns about your condition or care  Use the correct muscles:  Pelvic muscles are the muscles you use to control urine flow  To target these muscles, stop and start the flow of urine several times  This will help you become familiar with how it feels to tighten and relax these muscles  How to do Kegel exercises:   · Empty your bladder  You may lie down, stand up, or sit down to do these exercises  When you first try to do these exercises, it may be easier if you lie down  Tighten or squeeze your pelvic muscles slowly  It may feel like you are trying to hold back urine or gas  Hold this position for 3 seconds  Relax for 3 seconds  Repeat this cycle 10 times  · Do 10 sets of Kegel exercises, at least 3 times a day  Do not hold your breath when you do Kegel exercises  Keep your stomach, back, and leg muscles relaxed  · As your muscles get stronger, you will be able to hold the squeeze longer  Your healthcare provider may ask that you increase your pelvic muscle squeeze to 10 seconds  After you squeeze for 10 seconds, relax for 10 seconds  What else you should know:   · Once you know how to do Kegel exercises, use different positions  You can do these exercises while you lie on the floor, sit at your desk or watch TV, and while you stand  · You may notice improved bladder control within about 6 weeks       · Tighten your pelvic muscles before you sneeze, cough, or lift to prevent urine leakage  Follow up with your doctor as directed:  Write down your questions so you remember to ask them during your visits  © Copyright [employer] 2021 Information is for End User's use only and may not be sold, redistributed or otherwise used for commercial purposes  All illustrations and images included in CareNotes® are the copyrighted property of [employer]  or Lorena Castro   The above information is an  only  It is not intended as medical advice for individual conditions or treatments  Talk to your doctor, nurse or pharmacist before following any medical regimen to see if it is safe and effective for you

## 2022-01-04 NOTE — PROGRESS NOTES
1/4/2022      Chief Complaint   Patient presents with    overactive bladder     Assessment and Plan    19 y o  female managed by Dr Cole Linda    1  Overactive bladder symptoms  · PVR- 54 ml  · Urine dip in office unremarkable  · Continue Oxybutynin  · Will add Myrbetriq-prescription provided  · Continue to maintain adequate hydration upwards to 40 oz of water intake per day while avoiding bladder irritating foods and beverages  · Ensure complete bladder emptying with urination  · Timed voiding  · Continue with pelvic floor exercises  · Schedule urodynamics  · Patient follow-up in the office with physician after completion of urodynamics for evaluation of medications and review of urodynamic study for development of plan of care    History of Present Illness  Vania Bacon is a 19 y o  female here for follow up evaluation of overactive bladder  Patient previously seen 07/2021 for the complaint of overactive bladder symptoms of frequency, urgency and nocturia  She reports getting up 4-5 times at night to urinate  At her prior office evaluation patient was started on oxybutynin  Patient admits to performing Kegel exercises with no significant change in her urinary symptoms  Although, she reports not having the time to perform Kegel exercises  Patient reports going to urgent care 12/28/2021 for the complaint of urinary frequency and urgency  She states that they started her on p o  Keflex for urinary tract infection  Upon receipt of urine culture, patient is noted to not have a urinary tract infection  Urine culture negative for growth  Patient then reports proceeding back to the emergency department on 01/01/2021 for complaints of continued frequency and urgency  Since her prior office evaluation, patient initially stated that the oxybutynin was quite effective with her urinary symptoms    But in her next statement, patient reports worsening urinary symptoms with the inability to leave her house or go to work because of continued urinary frequency urgency and urinary incontinence  Patient not very forthcoming with information regarding HPI  Review of Systems   Constitutional: Negative for chills and fever  Respiratory: Negative for cough and shortness of breath  Cardiovascular: Negative for chest pain  Gastrointestinal: Negative for abdominal distention, abdominal pain, blood in stool, nausea and vomiting  Genitourinary: Positive for frequency and urgency  Negative for difficulty urinating, dysuria, enuresis, flank pain and hematuria  Skin: Negative for rash  Past Medical History  Past Medical History:   Diagnosis Date    [facility]     Hidradenitis suppurativa     thighs    Obesity, Class II, BMI 35-39 9     TMJ (temporomandibular joint disorder)     last assessed 02/24/2016    Urinary tract infection     Varicella     vaccine       Past Social History  Past Surgical History:   Procedure Laterality Date    COLONOSCOPY      x several; last one 2/2020    WISDOM TOOTH EXTRACTION       Social History     Tobacco Use   Smoking Status Former Smoker   Smokeless Tobacco Never Used   Tobacco Comment    hooka smoke       Past Family History  Family History   Problem Relation Age of Onset    Diabetes Mother         Due to underlying condition with foot ulcer  Type 2 DM without complication      Diabetes Father     Polycystic ovary syndrome Sister     Stroke Neg Hx     Heart attack Neg Hx     Hypertension Neg Hx     Breast cancer Neg Hx     Colon cancer Neg Hx     Ovarian cancer Neg Hx     Uterine cancer Neg Hx        Past Social history  Social History     Socioeconomic History    Marital status: Single     Spouse name: Not on file    Number of children: Not on file    Years of education: Not on file    Highest education level: Not on file   Occupational History    Not on file   Tobacco Use    Smoking status: Former Smoker    Smokeless tobacco: Never Used    Tobacco comment: hooka smoke   Vaping Use    Vaping Use: Never used   Substance and Sexual Activity    Alcohol use: No    Drug use: No    Sexual activity: Never   Other Topics Concern    Not on file   Social History Narrative    Not on file     Social Determinants of Health     Financial Resource Strain: Not on file   Food Insecurity: Not on file   Transportation Needs: Not on file   Physical Activity: Not on file   Stress: Not on file   Social Connections: Not on file   Intimate Partner Violence: Not on file   Housing Stability: Not on file       Current Medications  Current Outpatient Medications   Medication Sig Dispense Refill    acetaminophen (TYLENOL) 500 mg tablet Take 1 tablet (500 mg total) by mouth every 6 (six) hours as needed (pain) 30 tablet 0    cephalexin (KEFLEX) 500 mg capsule Take 1 capsule (500 mg total) by mouth every 12 (twelve) hours for 7 days 14 capsule 0    ibuprofen (MOTRIN) 400 mg tablet Take 1 tablet (400 mg total) by mouth every 6 (six) hours as needed for moderate pain 20 tablet 0    Multiple Vitamins-Minerals (HAIR SKIN AND NAILS FORMULA PO) Take by mouth      oxybutynin (DITROPAN-XL) 10 MG 24 hr tablet Take 1 tablet (10 mg total) by mouth daily at bedtime 30 tablet 3    phenazopyridine (PYRIDIUM) 200 mg tablet Take 1 tablet (200 mg total) by mouth 3 (three) times a day with meals 10 tablet 0    phentermine (ADIPEX-P) 37 5 MG tablet Take 37 5 mg by mouth every morning before breakfast Patient said she is taking 1/2 tablet right now      STELARA 90 MG/ML subcutaneous injection       topiramate (TOPAMAX) 25 mg tablet       Mirabegron ER 50 MG TB24 Take 1 tablet (50 mg total) by mouth in the morning 30 tablet 11     No current facility-administered medications for this visit         Allergies  Allergies   Allergen Reactions    Infliximab Hives     Difficulty breathing as well          The following portions of the patient's history were reviewed and updated as appropriate: allergies, current medications, past medical history, past social history, past surgical history and problem list       Vitals  Vitals:    01/04/22 1432   BP: 122/82   Pulse: 80   Weight: 103 kg (226 lb)   Height: 5' 4" (1 626 m)     Physical Exam  Physical Exam  Vitals reviewed  Constitutional:       General: She is not in acute distress  Appearance: Normal appearance  Cardiovascular:      Rate and Rhythm: Regular rhythm  Heart sounds: Normal heart sounds  Pulmonary:      Effort: Pulmonary effort is normal  No respiratory distress  Breath sounds: Normal breath sounds  Musculoskeletal:         General: Normal range of motion  Skin:     General: Skin is warm and dry  Neurological:      General: No focal deficit present  Mental Status: She is alert  Psychiatric:         Mood and Affect: Mood normal          Behavior: Behavior normal            Results  No results found for this or any previous visit (from the past 1 hour(s))  ]  No results found for: PSA  Lab Results   Component Value Date    CALCIUM 8 9 12/13/2021    K 3 5 (L) 12/13/2021    CO2 27 12/13/2021     12/13/2021    BUN 8 12/13/2021    CREATININE 0 80 12/13/2021     Lab Results   Component Value Date    WBC 4 20 (L) 12/13/2021    HGB 14 8 12/13/2021    HCT 45 0 12/13/2021    MCV 86 12/13/2021     12/13/2021           Orders  Orders Placed This Encounter   Procedures    US kidney and bladder     Standing Status:   Future     Standing Expiration Date:   1/4/2026     Scheduling Instructions:      "Prep required if being scheduled in conjunction with other studies, refer to those examination's Preps first before scheduling  All patients for US Kidney and Bladder they must drink 24 oz of water 60 minutes before your scheduled appointment time  This test requires you to have a FULL bladder   Please do not urinate before your test             Please bring your physician order, insurance cards, a form of photo ID and a list of your medications with you  Arrive 15 minutes prior to your appointment time in order to register  If you need to have lab work or a urinalysis, please do this AFTER your ultrasound  "     Order Specific Question:   Reason for Exam:     Answer:   OAB, right suprapubic pain     Order Specific Question:   Is the patient pregnant?      Answer:   No    POCT urine dip    POCT Measure PVR    Urodynamics     Standing Status:   Future     Standing Expiration Date:   1/4/2023       SPENCER Pandya

## 2022-01-06 ENCOUNTER — TELEPHONE (OUTPATIENT)
Dept: OTHER | Facility: OTHER | Age: 22
End: 2022-01-06

## 2022-01-06 NOTE — TELEPHONE ENCOUNTER
Patient started taking Mirabegron a few days ago and reports abdominal cramping and loose stools and would like to know if this is a side effect of the meds  Please call

## 2022-01-06 NOTE — TELEPHONE ENCOUNTER
Called and told patient this can be a side effect to the medication and to take probiotics as well as trying to eat binding foods  She asked if she could take tylenol for cramping which I confirmed she could

## 2022-01-17 ENCOUNTER — TELEPHONE (OUTPATIENT)
Dept: FAMILY MEDICINE CLINIC | Facility: CLINIC | Age: 22
End: 2022-01-17

## 2022-01-17 NOTE — TELEPHONE ENCOUNTER
Spoke with patient advised of instructions  She is aware this is the side effect and urology advised her to contact us regarding the constipation   Advised her to contact them to see if there is something else that can be prescribed,

## 2022-01-17 NOTE — TELEPHONE ENCOUNTER
pc from pt stating she started a new medication from her Urologist and its causing constipation, would like to speak with Tete Alcantar regarding this

## 2022-01-18 ENCOUNTER — NURSE TRIAGE (OUTPATIENT)
Dept: OTHER | Facility: OTHER | Age: 22
End: 2022-01-18

## 2022-01-18 DIAGNOSIS — N32.81 OAB (OVERACTIVE BLADDER): Primary | ICD-10-CM

## 2022-01-25 RX ORDER — TOLTERODINE TARTRATE 1 MG/1
1 TABLET, EXTENDED RELEASE ORAL 2 TIMES DAILY
Qty: 60 TABLET | Refills: 3 | Status: SHIPPED | OUTPATIENT
Start: 2022-01-25 | End: 2022-01-27 | Stop reason: SDUPTHER

## 2022-01-27 RX ORDER — TOLTERODINE TARTRATE 1 MG/1
1 TABLET, EXTENDED RELEASE ORAL 2 TIMES DAILY
Qty: 60 TABLET | Refills: 3 | Status: SHIPPED | OUTPATIENT
Start: 2022-01-27 | End: 2022-03-22 | Stop reason: SINTOL

## 2022-01-27 NOTE — TELEPHONE ENCOUNTER
All anticholinergics can cause side effect of constipation  Can trial a different medication at lower dose, but patient may experience constipation 
Call left on voice message   Patient questioning about whether to continue taking the oxybutynin or discontinue  Returned call to patient advised patient to discontinue the oxybutynin as advised per provider and start the detrol   Patient verbalized understanding and had no further questions at this time 
Called onelia back and notified her of new medication  We discussed that she should try to take medication at the same time every day and with her constipation since all medication can have that side effect should incorporate fruit like applesauce and hydrate with water    She states she understands
Okay for patient to discontinue Myrbetriq  Unfortunately, other anticholinergics can cause were side effects of constipation and dry mouth  If amenable, will send a new prescription to pharmacy 
Patient called to say medication Tolterodine is not at pharmacy  Would like I resent 
Prescription re-sent to her [facility] pharmacy on file 
Reason for Disposition   Caller has NON-URGENT medicine question about med that PCP or specialist prescribed and triager unable to answer question    Answer Assessment - Initial Assessment Questions  1  NAME of MEDICATION: "What medicine are you calling about?"      Myrbetriq  2  QUESTION: "What is your question?" (e g , medication refill, side effect)      Concerned about constipation as side effect  3  PRESCRIBING HCP: "Who prescribed it?" Reason: if prescribed by specialist, call should be referred to that group  Urology  4  SYMPTOMS: "Do you have any symptoms?"      Abd cramping, feeling bloated; mild-moderate constipation  5  SEVERITY: If symptoms are present, ask "Are they mild, moderate or severe?"      Moderate  6  PREGNANCY:  "Is there any chance that you are pregnant?" "When was your last menstrual period?"      Denies  Had BM this morning, but not as much  Has tried probiotics and tried prune juice  Stated the medication has been helping the bladder issues, but she is bloated and has abdominal cramping  Patient stated she has been constipated for about a week and a half; patient stated that she used to frequently go due to her GI issues, but when she started the medication, she slowly became more constipated  Patient wanted to know if she should try a stool softener  Patient did send a message to her PCP regarding her constipation; they advised if it continued or worsened, to follow up with urology office      Protocols used: MEDICATION QUESTION CALL-ADULT-OH
Regarding: Constipation/ Urology  ----- Message from Cornelio Piedra sent at 1/18/2022  9:35 AM EST -----  "I have a lot of constipation from my medication "
Returned call to patient  Has had constipation for week an half  Patient states drinking prune juice and probiotics   Patient has bloating and cramps last all day  and right side chest pain( intermittent)  Just had once yesterday  Reviewed common side effects with use of Mybretriq   Advised adding colace  To help with constipation  Patient states she does not want to take any more medication in addition to what she is already taking  Patient is requesting to have different medication called into her pharmacy    Does not like the side effects from the Mybretriq 25g     Pharmacy confirmed as :  [id number], [address] [address]   [address], [place]  [address] [id number]   Phone:  [phone]  Fax:  [phone]
Spoke to patient to advise script was resent to her pharmacy 
Spoke with patient and relayed message  She states she did have BM yesterday and would like to remain taking Myrbetriq since it is helping with OAB  She will call back if anything changes 
pts care is managed by the [place] office  Pt was last seen 1/4/22  Pt reporting she did discontinue taking the Mirabegron due to constipation  Pt is requesting another medication to treat her OAB      Please be advised   Thank you
1 pair of sneaker/with patient

## 2022-02-02 ENCOUNTER — APPOINTMENT (OUTPATIENT)
Dept: LAB | Facility: HOSPITAL | Age: 22
End: 2022-02-02
Payer: MEDICARE

## 2022-02-02 DIAGNOSIS — R11.0 NAUSEA: ICD-10-CM

## 2022-02-02 DIAGNOSIS — Z00.01 ENCOUNTER FOR GENERAL ADULT MEDICAL EXAMINATION WITH ABNORMAL FINDINGS: ICD-10-CM

## 2022-02-02 DIAGNOSIS — R79.82 ELEVATED C-REACTIVE PROTEIN (CRP): ICD-10-CM

## 2022-02-02 DIAGNOSIS — R10.84 ABDOMINAL PAIN, GENERALIZED: ICD-10-CM

## 2022-02-02 DIAGNOSIS — E66.01 MORBID OBESITY (HCC): ICD-10-CM

## 2022-02-02 DIAGNOSIS — R19.7 DIARRHEA, UNSPECIFIED TYPE: ICD-10-CM

## 2022-02-02 DIAGNOSIS — R50.9 FEVER, UNSPECIFIED FEVER CAUSE: ICD-10-CM

## 2022-02-02 LAB
25(OH)D3 SERPL-MCNC: 41.3 NG/ML (ref 30–100)
ALBUMIN SERPL BCP-MCNC: 4.5 G/DL (ref 3–5.2)
ALP SERPL-CCNC: 68 U/L (ref 43–122)
ALT SERPL W P-5'-P-CCNC: 16 U/L
ANION GAP SERPL CALCULATED.3IONS-SCNC: 8 MMOL/L (ref 5–14)
AST SERPL W P-5'-P-CCNC: 24 U/L (ref 14–36)
BASOPHILS # BLD AUTO: 0.1 THOUSANDS/ΜL (ref 0–0.1)
BASOPHILS NFR BLD AUTO: 1 % (ref 0–1)
BILIRUB SERPL-MCNC: 0.71 MG/DL
BUN SERPL-MCNC: 12 MG/DL (ref 5–25)
CALCIUM SERPL-MCNC: 9.3 MG/DL (ref 8.4–10.2)
CHLORIDE SERPL-SCNC: 104 MMOL/L (ref 97–108)
CHOLEST SERPL-MCNC: 146 MG/DL
CO2 SERPL-SCNC: 27 MMOL/L (ref 22–30)
CREAT SERPL-MCNC: 0.88 MG/DL (ref 0.6–1.2)
CRP SERPL QL: 8 MG/L
EOSINOPHIL # BLD AUTO: 0.2 THOUSAND/ΜL (ref 0–0.4)
EOSINOPHIL NFR BLD AUTO: 2 % (ref 0–6)
ERYTHROCYTE [DISTWIDTH] IN BLOOD BY AUTOMATED COUNT: 14.7 %
GFR SERPL CREATININE-BSD FRML MDRD: 92 ML/MIN/1.73SQ M
GLUCOSE P FAST SERPL-MCNC: 103 MG/DL (ref 70–99)
HCT VFR BLD AUTO: 42.6 % (ref 36–46)
HDLC SERPL-MCNC: 41 MG/DL
HGB BLD-MCNC: 14.4 G/DL (ref 12–16)
INSULIN SERPL-ACNC: 10.7 MU/L (ref 3–25)
LDLC SERPL CALC-MCNC: 91 MG/DL
LYMPHOCYTES # BLD AUTO: 1.4 THOUSANDS/ΜL (ref 0.5–4)
LYMPHOCYTES NFR BLD AUTO: 19 % (ref 25–45)
MCH RBC QN AUTO: 29.4 PG (ref 26–34)
MCHC RBC AUTO-ENTMCNC: 33.9 G/DL (ref 31–36)
MCV RBC AUTO: 87 FL (ref 80–100)
MONOCYTES # BLD AUTO: 0.5 THOUSAND/ΜL (ref 0.2–0.9)
MONOCYTES NFR BLD AUTO: 7 % (ref 1–10)
NEUTROPHILS # BLD AUTO: 5.1 THOUSANDS/ΜL (ref 1.8–7.8)
NEUTS SEG NFR BLD AUTO: 70 % (ref 45–65)
NONHDLC SERPL-MCNC: 105 MG/DL
PLATELET # BLD AUTO: 343 THOUSANDS/UL (ref 150–450)
PMV BLD AUTO: 8.6 FL (ref 8.9–12.7)
POTASSIUM SERPL-SCNC: 4.3 MMOL/L (ref 3.6–5)
PROT SERPL-MCNC: 8.6 G/DL (ref 5.9–8.4)
RBC # BLD AUTO: 4.91 MILLION/UL (ref 4–5.2)
SODIUM SERPL-SCNC: 139 MMOL/L (ref 137–147)
TRIGL SERPL-MCNC: 71 MG/DL
TSH SERPL DL<=0.05 MIU/L-ACNC: 0.6 UIU/ML (ref 0.47–4.68)
WBC # BLD AUTO: 7.3 THOUSAND/UL (ref 4.5–11)

## 2022-02-02 PROCEDURE — 82306 VITAMIN D 25 HYDROXY: CPT

## 2022-02-02 PROCEDURE — 86140 C-REACTIVE PROTEIN: CPT

## 2022-02-02 PROCEDURE — 80061 LIPID PANEL: CPT

## 2022-02-02 PROCEDURE — 85025 COMPLETE CBC W/AUTO DIFF WBC: CPT

## 2022-02-02 PROCEDURE — 84443 ASSAY THYROID STIM HORMONE: CPT

## 2022-02-02 PROCEDURE — 36415 COLL VENOUS BLD VENIPUNCTURE: CPT

## 2022-02-02 PROCEDURE — 83525 ASSAY OF INSULIN: CPT

## 2022-02-02 PROCEDURE — 80053 COMPREHEN METABOLIC PANEL: CPT

## 2022-02-04 ENCOUNTER — OFFICE VISIT (OUTPATIENT)
Dept: FAMILY MEDICINE CLINIC | Facility: CLINIC | Age: 22
End: 2022-02-04
Payer: MEDICARE

## 2022-02-04 VITALS
HEIGHT: 64 IN | TEMPERATURE: 98.1 F | RESPIRATION RATE: 16 BRPM | DIASTOLIC BLOOD PRESSURE: 68 MMHG | BODY MASS INDEX: 36.88 KG/M2 | OXYGEN SATURATION: 99 % | SYSTOLIC BLOOD PRESSURE: 118 MMHG | HEART RATE: 96 BPM | WEIGHT: 216 LBS

## 2022-02-04 DIAGNOSIS — J02.9 SORE THROAT: ICD-10-CM

## 2022-02-04 DIAGNOSIS — E66.01 MORBID OBESITY (HCC): ICD-10-CM

## 2022-02-04 DIAGNOSIS — R73.01 IFG (IMPAIRED FASTING GLUCOSE): ICD-10-CM

## 2022-02-04 DIAGNOSIS — E55.9 VITAMIN D DEFICIENCY: ICD-10-CM

## 2022-02-04 DIAGNOSIS — K59.03 DRUG-INDUCED CONSTIPATION: Primary | ICD-10-CM

## 2022-02-04 DIAGNOSIS — K50.10 CROHN'S DISEASE OF LARGE INTESTINE WITHOUT COMPLICATION (HCC): ICD-10-CM

## 2022-02-04 PROCEDURE — 87070 CULTURE OTHR SPECIMN AEROBIC: CPT | Performed by: FAMILY MEDICINE

## 2022-02-04 PROCEDURE — 99214 OFFICE O/P EST MOD 30 MIN: CPT | Performed by: FAMILY MEDICINE

## 2022-02-04 RX ORDER — METFORMIN HYDROCHLORIDE 500 MG/1
500 TABLET, EXTENDED RELEASE ORAL
Qty: 30 TABLET | Refills: 2 | Status: SHIPPED | OUTPATIENT
Start: 2022-02-04 | End: 2022-05-02

## 2022-02-04 RX ORDER — ERGOCALCIFEROL 1.25 MG/1
CAPSULE ORAL
COMMUNITY
Start: 2022-01-13 | End: 2022-03-10 | Stop reason: ALTCHOICE

## 2022-02-04 RX ORDER — USTEKINUMAB 45 MG/.5ML
INJECTION, SOLUTION SUBCUTANEOUS
COMMUNITY
Start: 2022-01-12

## 2022-02-04 RX ORDER — DOCUSATE SODIUM 100 MG/1
100 CAPSULE, LIQUID FILLED ORAL DAILY
Qty: 30 CAPSULE | Refills: 2 | Status: SHIPPED | OUTPATIENT
Start: 2022-02-04

## 2022-02-04 NOTE — ASSESSMENT & PLAN NOTE
New diagnosis symptomatic since patient started med for overactive bladder   A discussed with the patient of the side effect for this medication and his anticholinergic can cause constipation patient already contact Urology change it to Detrol LA 1 mg recommend to the patient to take it every other day start Colace 100 mg 1-2 tablet a day increase fiber increased fluid intake increased physical activity

## 2022-02-04 NOTE — PROGRESS NOTES
BMI Counseling: Body mass index is 37 08 kg/m²  The BMI is above normal  Nutrition recommendations include decreasing portion sizes, decreasing fast food intake and limiting drinks that contain sugar  Exercise recommendations include exercising 3-5 times per week  No pharmacotherapy was ordered  Rationale for BMI follow-up plan is due to patient being overweight or obese  Depression Screening and Follow-up Plan: Patient was screened for depression during today's encounter  They screened negative with a PHQ-2 score of 0  Subjective:   Chief Complaint   Patient presents with    Follow-up     chronic conditions        Patient ID: Yuniel Jarquin is a 19 y o  female  Patient here follow-up with a chronic condition she is concerned about sore throat it has been going on for 1 week difficulty swelling no cough no fever no upper respiratory infection patient tested positive for COVID in beginning of December and symptom improved patient also concerned about constipation since she start medication for overactive bladder her bowel movement become hard and she does not go to the bathroom every day and no abdomen pain no blood in the stool recent blood work discussed with the patient      The following portions of the patient's history were reviewed and updated as appropriate: allergies, current medications, past family history, past medical history, past social history, past surgical history and problem list     Review of Systems   Constitutional: Negative for activity change, appetite change, fatigue and fever  HENT: Positive for sore throat  Negative for congestion, ear pain, sinus pressure and sinus pain  Eyes: Negative for pain, discharge, redness and itching  Respiratory: Negative for cough, chest tightness, shortness of breath and stridor  Cardiovascular: Negative for chest pain, palpitations and leg swelling  Gastrointestinal: Positive for constipation   Negative for abdominal pain, blood in stool, diarrhea and nausea  Genitourinary: Negative for dysuria, flank pain, frequency and hematuria  Musculoskeletal: Negative for back pain, joint swelling and neck pain  Skin: Negative for pallor and rash  Neurological: Negative for dizziness, tremors, weakness, numbness and headaches  Hematological: Does not bruise/bleed easily  Objective:  Vitals:    02/04/22 0732   BP: 118/68   BP Location: Left arm   Patient Position: Sitting   Cuff Size: Large   Pulse: 96   Resp: 16   Temp: 98 1 °F (36 7 °C)   TempSrc: Tympanic   SpO2: 99%   Weight: 98 kg (216 lb)   Height: 5' 4" (1 626 m)      Physical Exam  Vitals and nursing note reviewed  Constitutional:       General: She is not in acute distress  Appearance: She is well-developed  She is not diaphoretic  HENT:      Head: Normocephalic  Right Ear: Tympanic membrane, ear canal and external ear normal       Left Ear: Tympanic membrane, ear canal and external ear normal       Nose: Nose normal  No congestion or rhinorrhea  Mouth/Throat:      Mouth: Mucous membranes are moist       Pharynx: Oropharynx is clear  Posterior oropharyngeal erythema present  No oropharyngeal exudate  Eyes:      General:         Right eye: No discharge  Left eye: No discharge  Conjunctiva/sclera: Conjunctivae normal       Pupils: Pupils are equal, round, and reactive to light  Neck:      Vascular: No JVD  Cardiovascular:      Rate and Rhythm: Normal rate and regular rhythm  Heart sounds: Normal heart sounds  No murmur heard  No gallop  Pulmonary:      Effort: Pulmonary effort is normal  No respiratory distress  Breath sounds: Normal breath sounds  No stridor  No wheezing or rales  Chest:      Chest wall: No tenderness  Abdominal:      General: There is no distension  Palpations: Abdomen is soft  There is no mass  Tenderness: There is no abdominal tenderness  There is no rebound     Musculoskeletal: General: No tenderness  Cervical back: Normal range of motion and neck supple  Lymphadenopathy:      Cervical: No cervical adenopathy  Skin:     General: Skin is warm  Findings: No erythema or rash  Neurological:      Mental Status: She is alert and oriented to person, place, and time  Motor: No weakness  Gait: Gait normal            Assessment/Plan:    Drug-induced constipation  New diagnosis symptomatic since patient started med for overactive bladder   A discussed with the patient of the side effect for this medication and his anticholinergic can cause constipation patient already contact Urology change it to Detrol LA 1 mg recommend to the patient to take it every other day start Colace 100 mg 1-2 tablet a day increase fiber increased fluid intake increased physical activity    Crohn's disease of large intestine without complication (HCC)  Chronic stable follow-up with GI tolerated medication without side effect    IFG (impaired fasting glucose)  A chronic an control patient with the positive family history of diabetic post of the parent and abnormal BMI 37 0 recommend to start the metformin 500 mg extended release once a day proper use and possible side effect discussed the patient low carb diet important lose weight review with the patient    Morbid obesity ([address] )  The BMI is above average  BMI counseling and education was provided to the patient  Nutrition recommendations include reducing portion sizes, decreasing overall calorie intake, 3-5 servings of fruits/vegetables daily, reducing fast food intake, consuming healthier snacks, decreasing soda and/or juice intake, moderation in carbohydrate intake and reducing intake of saturated fat and trans fat  Exercise recommendations include moderate aerobic physical activity for 150 minutes/week, exercising 3-5 times per week and joining a gym      Sore throat  Acute symptomatic it has been going on for 1 week physical exam positive for a erythema and swelling the patient tested positive for COVID on December 9 she feel symptomatic was otherwise is better plan to do a throat culture wait for the result of come back positive for will give antibiotic discussed the patient she agree    Vitamin D deficiency  A new diagnosis symptomatic patient already started the by a weight management provider on vitamin-D 50,000 weekly discussed with the patient was her vitamin-D level recommend the a to stop the 50,000 intake 2000 daily       Diagnoses and all orders for this visit:    Drug-induced constipation  -     docusate sodium (COLACE) 100 mg capsule; Take 1 capsule (100 mg total) by mouth in the morning  -     CBC and differential; Future  -     Basic metabolic panel; Future  -     Lipid Panel with Direct LDL reflex; Future    Vitamin D deficiency  -     CBC and differential; Future  -     Basic metabolic panel; Future  -     Lipid Panel with Direct LDL reflex; Future    IFG (impaired fasting glucose)  -     metFORMIN (GLUCOPHAGE-XR) 500 mg 24 hr tablet; Take 1 tablet (500 mg total) by mouth daily with dinner  -     CBC and differential; Future  -     Basic metabolic panel; Future  -     Lipid Panel with Direct LDL reflex; Future    Sore throat  -     CBC and differential; Future  -     Basic metabolic panel; Future  -     Lipid Panel with Direct LDL reflex; Future  -     Throat culture;  Future  -     Throat culture    Crohn's disease of large intestine without complication ([facility] )    Morbid obesity ([facility] )    Other orders  -     Stelara 45 MG/0 5ML injection  -     ergocalciferol (VITAMIN D2) 50,000 units; [address] ORAL EVERY WEEK

## 2022-02-06 PROBLEM — R50.9 FEVER: Status: RESOLVED | Noted: 2021-12-08 | Resolved: 2022-02-06

## 2022-02-06 LAB — BACTERIA THROAT CULT: NORMAL

## 2022-02-06 NOTE — ASSESSMENT & PLAN NOTE
A new diagnosis symptomatic patient already started the by a weight management provider on vitamin-D 50,000 weekly discussed with the patient was her vitamin-D level recommend the a to stop the 50,000 intake 2000 daily

## 2022-02-06 NOTE — ASSESSMENT & PLAN NOTE
Acute symptomatic it has been going on for 1 week physical exam positive for a erythema and swelling the patient tested positive for COVID on December 9 she feel symptomatic was otherwise is better plan to do a throat culture wait for the result of come back positive for will give antibiotic discussed the patient she agree

## 2022-02-06 NOTE — ASSESSMENT & PLAN NOTE
A chronic an control patient with the positive family history of diabetic post of the parent and abnormal BMI 37 0 recommend to start the metformin 500 mg extended release once a day proper use and possible side effect discussed the patient low carb diet important lose weight review with the patient

## 2022-03-10 ENCOUNTER — OFFICE VISIT (OUTPATIENT)
Dept: FAMILY MEDICINE CLINIC | Facility: CLINIC | Age: 22
End: 2022-03-10
Payer: MEDICARE

## 2022-03-10 VITALS
OXYGEN SATURATION: 100 % | WEIGHT: 206 LBS | DIASTOLIC BLOOD PRESSURE: 64 MMHG | BODY MASS INDEX: 35.17 KG/M2 | HEIGHT: 64 IN | RESPIRATION RATE: 16 BRPM | TEMPERATURE: 98.4 F | SYSTOLIC BLOOD PRESSURE: 132 MMHG | HEART RATE: 97 BPM

## 2022-03-10 DIAGNOSIS — R30.0 DYSURIA: Primary | ICD-10-CM

## 2022-03-10 PROBLEM — U07.1 COVID-19 VIRUS INFECTION: Status: RESOLVED | Noted: 2021-12-09 | Resolved: 2022-03-10

## 2022-03-10 PROBLEM — J02.9 SORE THROAT: Status: RESOLVED | Noted: 2022-02-04 | Resolved: 2022-03-10

## 2022-03-10 LAB
BACTERIA UR QL AUTO: NORMAL /HPF
BILIRUB UR QL STRIP: NEGATIVE
CLARITY UR: CLEAR
COLOR UR: COLORLESS
GLUCOSE UR STRIP-MCNC: NEGATIVE MG/DL
HGB UR QL STRIP.AUTO: NEGATIVE
KETONES UR STRIP-MCNC: NEGATIVE MG/DL
LEUKOCYTE ESTERASE UR QL STRIP: NEGATIVE
NITRITE UR QL STRIP: NEGATIVE
NON-SQ EPI CELLS URNS QL MICRO: NORMAL /HPF
PH UR STRIP.AUTO: 7 [PH]
PROT UR STRIP-MCNC: NEGATIVE MG/DL
RBC #/AREA URNS AUTO: NORMAL /HPF
SL AMB  POCT GLUCOSE, UA: NEGATIVE
SL AMB LEUKOCYTE ESTERASE,UA: ABNORMAL
SL AMB POCT BILIRUBIN,UA: NEGATIVE
SL AMB POCT BLOOD,UA: ABNORMAL
SL AMB POCT CLARITY,UA: CLEAR
SL AMB POCT COLOR,UA: YELLOW
SL AMB POCT KETONES,UA: ABNORMAL
SL AMB POCT NITRITE,UA: NEGATIVE
SL AMB POCT PH,UA: 7.5
SL AMB POCT SPECIFIC GRAVITY,UA: 1
SL AMB POCT URINE PROTEIN: NEGATIVE
SL AMB POCT UROBILINOGEN: NEGATIVE
SP GR UR STRIP.AUTO: 1.01 (ref 1–1.03)
UROBILINOGEN UR STRIP-ACNC: <2 MG/DL
WBC #/AREA URNS AUTO: NORMAL /HPF

## 2022-03-10 PROCEDURE — 81001 URINALYSIS AUTO W/SCOPE: CPT | Performed by: NURSE PRACTITIONER

## 2022-03-10 PROCEDURE — 87086 URINE CULTURE/COLONY COUNT: CPT | Performed by: NURSE PRACTITIONER

## 2022-03-10 PROCEDURE — 81002 URINALYSIS NONAUTO W/O SCOPE: CPT | Performed by: NURSE PRACTITIONER

## 2022-03-10 PROCEDURE — 99214 OFFICE O/P EST MOD 30 MIN: CPT | Performed by: NURSE PRACTITIONER

## 2022-03-10 RX ORDER — SULFAMETHOXAZOLE AND TRIMETHOPRIM 800; 160 MG/1; MG/1
1 TABLET ORAL 2 TIMES DAILY
Qty: 6 TABLET | Refills: 0 | Status: SHIPPED | OUTPATIENT
Start: 2022-03-10 | End: 2022-03-13

## 2022-03-10 NOTE — PROGRESS NOTES
Assessment/Plan:    Dysuria  Acute symptomatic with urgency, dysuria, suprapubic pressure, and frequency with urination  Denies fever, nausea, vomiting, back pain, hematuria  Denies PMH kidney infection  Denies being sexually active  Neg  Bilateral CVA tenderness  POCT in office dip abnormal  Does see urology for overactive bladder and has U/S kidney and bladder scheduled for 3/28/2022  Reports wears cotton underwear, well hydrated, denies wearing tight pants  Will recommend increase fluids, start bactrim BID x 3 days, UA C&S sent to lab, f/u with urology  Patient to call with worsening of symptoms  Diagnoses and all orders for this visit:    Dysuria  -     sulfamethoxazole-trimethoprim (BACTRIM DS) 800-160 mg per tablet; Take 1 tablet by mouth 2 (two) times a day for 3 days  -     Urinalysis with microscopic  -     Urine culture  -     POCT urine dip          Subjective:      Patient ID: Rosales Drew is a 19 y o  female  Urinary Tract Infection   This is a new problem  The current episode started in the past 7 days  The problem occurs every urination  The problem has been unchanged  The quality of the pain is described as burning  The pain is moderate  There has been no fever  She is not sexually active  There is no history of pyelonephritis  Associated symptoms include frequency and urgency  Pertinent negatives include no chills, discharge, flank pain, hematuria, nausea, possible pregnancy or vomiting  She has tried increased fluids for the symptoms  The treatment provided no relief  The following portions of the patient's history were reviewed and updated as appropriate: allergies, current medications, past family history, past medical history, past social history, past surgical history and problem list     Review of Systems   Constitutional: Negative for activity change, chills, fatigue and fever  HENT: Negative for congestion and sore throat      Respiratory: Negative for cough and shortness of breath  Cardiovascular: Negative for chest pain  Gastrointestinal: Negative for diarrhea, nausea and vomiting  Genitourinary: Positive for dysuria, frequency and urgency  Negative for flank pain and hematuria  Hematological: Negative for adenopathy  Psychiatric/Behavioral: Negative for agitation and confusion  Objective:      /64 (BP Location: Left arm, Patient Position: Sitting, Cuff Size: Large)   Pulse 97   Temp 98 4 °F (36 9 °C) (Tympanic)   Resp 16   Ht 5' 4" (1 626 m)   Wt 93 4 kg (206 lb)   SpO2 100%   BMI 35 36 kg/m²          Physical Exam  Vitals and nursing note reviewed  Constitutional:       General: She is not in acute distress  Appearance: Normal appearance  She is not ill-appearing, toxic-appearing or diaphoretic  HENT:      Head: Normocephalic and atraumatic  Nose: No congestion or rhinorrhea  Eyes:      General: No scleral icterus  Right eye: No discharge  Left eye: No discharge  Conjunctiva/sclera: Conjunctivae normal    Pulmonary:      Effort: Pulmonary effort is normal  No respiratory distress  Abdominal:      General: Abdomen is flat  Palpations: Abdomen is soft  There is no mass  Tenderness: There is abdominal tenderness (suprapubic)  There is no right CVA tenderness, left CVA tenderness or guarding  Musculoskeletal:         General: Normal range of motion  Cervical back: Normal range of motion  Right lower leg: No edema  Left lower leg: No edema  Skin:     Coloration: Skin is not jaundiced or pale  Findings: No bruising, erythema or rash  Neurological:      General: No focal deficit present  Mental Status: She is alert and oriented to person, place, and time  Psychiatric:         Mood and Affect: Mood normal          Behavior: Behavior normal          Thought Content:  Thought content normal          Judgment: Judgment normal

## 2022-03-11 PROBLEM — R30.0 DYSURIA: Status: ACTIVE | Noted: 2022-03-11

## 2022-03-13 ENCOUNTER — HOSPITAL ENCOUNTER (EMERGENCY)
Facility: HOSPITAL | Age: 22
Discharge: HOME | End: 2022-03-13
Attending: EMERGENCY MEDICINE
Payer: MEDICARE

## 2022-03-13 ENCOUNTER — APPOINTMENT (EMERGENCY)
Dept: CT IMAGING | Facility: HOSPITAL | Age: 22
End: 2022-03-13
Payer: MEDICARE

## 2022-03-13 VITALS
SYSTOLIC BLOOD PRESSURE: 124 MMHG | WEIGHT: 205.8 LBS | DIASTOLIC BLOOD PRESSURE: 77 MMHG | HEIGHT: 64 IN | OXYGEN SATURATION: 100 % | TEMPERATURE: 98.3 F | BODY MASS INDEX: 35.13 KG/M2 | HEART RATE: 99 BPM | RESPIRATION RATE: 18 BRPM

## 2022-03-13 DIAGNOSIS — K59.00 CONSTIPATION, UNSPECIFIED CONSTIPATION TYPE: Primary | ICD-10-CM

## 2022-03-13 DIAGNOSIS — R10.2 PAIN IN PELVIS: ICD-10-CM

## 2022-03-13 LAB
ALBUMIN SERPL BCP-MCNC: 4.7 G/DL (ref 3–5.2)
ALP SERPL-CCNC: 81 U/L (ref 43–122)
ALT SERPL W P-5'-P-CCNC: 36 U/L
ANION GAP SERPL CALCULATED.3IONS-SCNC: 7 MMOL/L (ref 5–14)
AST SERPL W P-5'-P-CCNC: 24 U/L (ref 14–36)
BACTERIA UR CULT: ABNORMAL
BACTERIA UR QL AUTO: NORMAL /HPF
BASOPHILS # BLD AUTO: 0.1 THOUSANDS/ΜL (ref 0–0.1)
BASOPHILS NFR BLD AUTO: 1 % (ref 0–1)
BILIRUB SERPL-MCNC: 1.06 MG/DL
BILIRUB UR QL STRIP: NEGATIVE
BUN SERPL-MCNC: 15 MG/DL (ref 5–25)
CALCIUM SERPL-MCNC: 9.4 MG/DL (ref 8.4–10.2)
CHLORIDE SERPL-SCNC: 103 MMOL/L (ref 97–108)
CLARITY UR: CLEAR
CO2 SERPL-SCNC: 28 MMOL/L (ref 22–30)
COLOR UR: ABNORMAL
CREAT SERPL-MCNC: 1.06 MG/DL (ref 0.6–1.2)
EOSINOPHIL # BLD AUTO: 0.2 THOUSAND/ΜL (ref 0–0.4)
EOSINOPHIL NFR BLD AUTO: 2 % (ref 0–6)
ERYTHROCYTE [DISTWIDTH] IN BLOOD BY AUTOMATED COUNT: 13.3 %
EXT PREG TEST URINE: NEGATIVE
EXT. CONTROL ED NAV: NORMAL
GFR SERPL CREATININE-BSD FRML MDRD: 74 ML/MIN/1.73SQ M
GLUCOSE SERPL-MCNC: 112 MG/DL (ref 70–99)
GLUCOSE UR STRIP-MCNC: NEGATIVE MG/DL
HCT VFR BLD AUTO: 42.7 % (ref 36–46)
HGB BLD-MCNC: 14.7 G/DL (ref 12–16)
HGB UR QL STRIP.AUTO: 10
KETONES UR STRIP-MCNC: NEGATIVE MG/DL
LEUKOCYTE ESTERASE UR QL STRIP: 25
LIPASE SERPL-CCNC: 84 U/L (ref 23–300)
LYMPHOCYTES # BLD AUTO: 1.8 THOUSANDS/ΜL (ref 0.5–4)
LYMPHOCYTES NFR BLD AUTO: 17 % (ref 25–45)
MCH RBC QN AUTO: 29.9 PG (ref 26–34)
MCHC RBC AUTO-ENTMCNC: 34.5 G/DL (ref 31–36)
MCV RBC AUTO: 87 FL (ref 80–100)
MONOCYTES # BLD AUTO: 0.8 THOUSAND/ΜL (ref 0.2–0.9)
MONOCYTES NFR BLD AUTO: 7 % (ref 1–10)
NEUTROPHILS # BLD AUTO: 7.6 THOUSANDS/ΜL (ref 1.8–7.8)
NEUTS SEG NFR BLD AUTO: 73 % (ref 45–65)
NITRITE UR QL STRIP: NEGATIVE
NON-SQ EPI CELLS URNS QL MICRO: NORMAL /HPF
PH UR STRIP.AUTO: 7 [PH]
PLATELET # BLD AUTO: 325 THOUSANDS/UL (ref 150–450)
PMV BLD AUTO: 8.4 FL (ref 8.9–12.7)
POTASSIUM SERPL-SCNC: 4.2 MMOL/L (ref 3.6–5)
PROT SERPL-MCNC: 8.9 G/DL (ref 5.9–8.4)
PROT UR STRIP-MCNC: NEGATIVE MG/DL
RBC # BLD AUTO: 4.92 MILLION/UL (ref 4–5.2)
RBC #/AREA URNS AUTO: NORMAL /HPF
SODIUM SERPL-SCNC: 138 MMOL/L (ref 137–147)
SP GR UR STRIP.AUTO: 1.01 (ref 1–1.04)
UROBILINOGEN UA: NEGATIVE MG/DL
WBC # BLD AUTO: 10.4 THOUSAND/UL (ref 4.5–11)
WBC #/AREA URNS AUTO: NORMAL /HPF

## 2022-03-13 PROCEDURE — 99284 EMERGENCY DEPT VISIT MOD MDM: CPT

## 2022-03-13 PROCEDURE — 81025 URINE PREGNANCY TEST: CPT | Performed by: PHYSICIAN ASSISTANT

## 2022-03-13 PROCEDURE — 99282 EMERGENCY DEPT VISIT SF MDM: CPT | Performed by: PHYSICIAN ASSISTANT

## 2022-03-13 PROCEDURE — 85025 COMPLETE CBC W/AUTO DIFF WBC: CPT | Performed by: PHYSICIAN ASSISTANT

## 2022-03-13 PROCEDURE — 36415 COLL VENOUS BLD VENIPUNCTURE: CPT | Performed by: PHYSICIAN ASSISTANT

## 2022-03-13 PROCEDURE — 80053 COMPREHEN METABOLIC PANEL: CPT | Performed by: PHYSICIAN ASSISTANT

## 2022-03-13 PROCEDURE — 81001 URINALYSIS AUTO W/SCOPE: CPT | Performed by: PHYSICIAN ASSISTANT

## 2022-03-13 PROCEDURE — 83690 ASSAY OF LIPASE: CPT | Performed by: PHYSICIAN ASSISTANT

## 2022-03-13 PROCEDURE — 81003 URINALYSIS AUTO W/O SCOPE: CPT | Performed by: PHYSICIAN ASSISTANT

## 2022-03-13 PROCEDURE — 74177 CT ABD & PELVIS W/CONTRAST: CPT

## 2022-03-13 RX ORDER — POLYETHYLENE GLYCOL 3350 17 G/17G
17 POWDER, FOR SOLUTION ORAL DAILY
Qty: 20 EACH | Refills: 0 | Status: SHIPPED | OUTPATIENT
Start: 2022-03-13

## 2022-03-13 RX ORDER — GLYCERIN PEDIATRIC
1 SUPPOSITORY, RECTAL RECTAL DAILY
Qty: 10 SUPPOSITORY | Refills: 0 | Status: SHIPPED | OUTPATIENT
Start: 2022-03-13 | End: 2022-07-11 | Stop reason: ALTCHOICE

## 2022-03-13 RX ADMIN — IOHEXOL 100 ML: 350 INJECTION, SOLUTION INTRAVENOUS at 14:11

## 2022-03-13 RX ADMIN — IOHEXOL 50 ML: 240 INJECTION, SOLUTION INTRATHECAL; INTRAVASCULAR; INTRAVENOUS; ORAL at 12:15

## 2022-03-13 RX ADMIN — GLYCERIN 1 SUPPOSITORY: 2 SUPPOSITORY RECTAL at 14:39

## 2022-03-13 NOTE — Clinical Note
Martine Lopez was seen and treated in our emergency department on 3/13/2022  Diagnosis:     Praveen Rogers  may return to work on return date  She may return on this date: 03/16/2022         If you have any questions or concerns, please don't hesitate to call        Lucille Harmon RN    ______________________________           _______________          _______________  Hospital Representative                              Date                                Time

## 2022-03-14 ENCOUNTER — TELEPHONE (OUTPATIENT)
Dept: FAMILY MEDICINE CLINIC | Facility: CLINIC | Age: 22
End: 2022-03-14

## 2022-03-14 NOTE — TELEPHONE ENCOUNTER
----- Message from Margaret Maki sent at 3/14/2022  8:09 AM EDT -----  Urine culture was positive for UTI, complete antibiotics

## 2022-03-14 NOTE — ED ATTENDING ATTESTATION
I was the attending physician on duty at the time the patient visited the emergency department  The patient was evaluated and dispositioned by the APC  I was personally available for consultation  I am administratively signing the chart after the fact      Yonas Dickens MD

## 2022-03-14 NOTE — TELEPHONE ENCOUNTER
Spoke with patient she ended up going to the ER yesterday due to severe constipation thinks may have been due to the antibiotics so she stopped them, she is still having symptoms and now with constipation and painful to pass a bowel movement,

## 2022-03-15 NOTE — TELEPHONE ENCOUNTER
Spoke with patient today to check in how she is feeling states she bought milk of magnesia and was able to use the bathroom  She is still having burning and frequency but thinks may be a mental thing

## 2022-03-22 ENCOUNTER — APPOINTMENT (OUTPATIENT)
Dept: LAB | Facility: HOSPITAL | Age: 22
End: 2022-03-22
Payer: MEDICARE

## 2022-03-22 ENCOUNTER — TELEPHONE (OUTPATIENT)
Dept: OTHER | Facility: OTHER | Age: 22
End: 2022-03-22

## 2022-03-22 ENCOUNTER — TELEPHONE (OUTPATIENT)
Dept: FAMILY MEDICINE CLINIC | Facility: CLINIC | Age: 22
End: 2022-03-22

## 2022-03-22 DIAGNOSIS — K59.03 DRUG-INDUCED CONSTIPATION: ICD-10-CM

## 2022-03-22 DIAGNOSIS — E55.9 VITAMIN D DEFICIENCY: ICD-10-CM

## 2022-03-22 DIAGNOSIS — J02.9 SORE THROAT: ICD-10-CM

## 2022-03-22 DIAGNOSIS — R73.01 IFG (IMPAIRED FASTING GLUCOSE): ICD-10-CM

## 2022-03-22 LAB
ANION GAP SERPL CALCULATED.3IONS-SCNC: 6 MMOL/L (ref 5–14)
BASOPHILS # BLD AUTO: 0.1 THOUSANDS/ΜL (ref 0–0.1)
BASOPHILS NFR BLD AUTO: 1 % (ref 0–1)
BUN SERPL-MCNC: 16 MG/DL (ref 5–25)
CALCIUM SERPL-MCNC: 9.1 MG/DL (ref 8.4–10.2)
CHLORIDE SERPL-SCNC: 102 MMOL/L (ref 97–108)
CHOLEST SERPL-MCNC: 129 MG/DL
CO2 SERPL-SCNC: 28 MMOL/L (ref 22–30)
CREAT SERPL-MCNC: 0.73 MG/DL (ref 0.6–1.2)
EOSINOPHIL # BLD AUTO: 0.2 THOUSAND/ΜL (ref 0–0.4)
EOSINOPHIL NFR BLD AUTO: 3 % (ref 0–6)
ERYTHROCYTE [DISTWIDTH] IN BLOOD BY AUTOMATED COUNT: 13.2 %
GFR SERPL CREATININE-BSD FRML MDRD: 116 ML/MIN/1.73SQ M
GLUCOSE P FAST SERPL-MCNC: 102 MG/DL (ref 70–99)
HCT VFR BLD AUTO: 42.1 % (ref 36–46)
HDLC SERPL-MCNC: 51 MG/DL
HGB BLD-MCNC: 14.1 G/DL (ref 12–16)
LDLC SERPL CALC-MCNC: 66 MG/DL
LYMPHOCYTES # BLD AUTO: 1.8 THOUSANDS/ΜL (ref 0.5–4)
LYMPHOCYTES NFR BLD AUTO: 22 % (ref 25–45)
MCH RBC QN AUTO: 29.4 PG (ref 26–34)
MCHC RBC AUTO-ENTMCNC: 33.5 G/DL (ref 31–36)
MCV RBC AUTO: 88 FL (ref 80–100)
MONOCYTES # BLD AUTO: 0.6 THOUSAND/ΜL (ref 0.2–0.9)
MONOCYTES NFR BLD AUTO: 7 % (ref 1–10)
NEUTROPHILS # BLD AUTO: 5.4 THOUSANDS/ΜL (ref 1.8–7.8)
NEUTS SEG NFR BLD AUTO: 67 % (ref 45–65)
PLATELET # BLD AUTO: 343 THOUSANDS/UL (ref 150–450)
PMV BLD AUTO: 7.9 FL (ref 8.9–12.7)
POTASSIUM SERPL-SCNC: 4.4 MMOL/L (ref 3.6–5)
RBC # BLD AUTO: 4.8 MILLION/UL (ref 4–5.2)
SODIUM SERPL-SCNC: 136 MMOL/L (ref 137–147)
TRIGL SERPL-MCNC: 58 MG/DL
WBC # BLD AUTO: 8.2 THOUSAND/UL (ref 4.5–11)

## 2022-03-22 PROCEDURE — 80048 BASIC METABOLIC PNL TOTAL CA: CPT

## 2022-03-22 PROCEDURE — 36415 COLL VENOUS BLD VENIPUNCTURE: CPT

## 2022-03-22 PROCEDURE — 85025 COMPLETE CBC W/AUTO DIFF WBC: CPT

## 2022-03-22 PROCEDURE — 80061 LIPID PANEL: CPT

## 2022-03-22 NOTE — TELEPHONE ENCOUNTER
----- Message from [id number] [place] sent at 3/22/2022  2:01 PM EDT -----  Labwork unremarkable  Was patient fasting for labs, slightly elevated blood glucose 102 should be below 100   Increase exercise and watch carbs and sugars in diet

## 2022-03-22 NOTE — TELEPHONE ENCOUNTER
Patient stated she would like to stop taking tolterodine (DETROL) 1 mg tablet, due to constipation  Patient would like to discuss this with a nurse

## 2022-03-22 NOTE — TELEPHONE ENCOUNTER
Patient previously reported side effects with oxybutynin and Myrbetriq  As previously mentioned, all anticholinergics can have similar side effects, and can cause constipation

## 2022-04-01 ENCOUNTER — HOSPITAL ENCOUNTER (OUTPATIENT)
Dept: ULTRASOUND IMAGING | Facility: HOSPITAL | Age: 22
Discharge: HOME | End: 2022-04-01
Payer: MEDICARE

## 2022-04-01 DIAGNOSIS — N32.81 OAB (OVERACTIVE BLADDER): ICD-10-CM

## 2022-04-01 DIAGNOSIS — R10.2 SUPRAPUBIC PAIN: ICD-10-CM

## 2022-04-01 PROCEDURE — 76770 US EXAM ABDO BACK WALL COMP: CPT

## 2022-04-30 DIAGNOSIS — R73.01 IFG (IMPAIRED FASTING GLUCOSE): ICD-10-CM

## 2022-05-02 ENCOUNTER — TELEPHONE (OUTPATIENT)
Dept: FAMILY MEDICINE CLINIC | Facility: CLINIC | Age: 22
End: 2022-05-02

## 2022-05-02 DIAGNOSIS — R30.0 DYSURIA: ICD-10-CM

## 2022-05-02 RX ORDER — METFORMIN HYDROCHLORIDE 500 MG/1
TABLET, EXTENDED RELEASE ORAL
Qty: 30 TABLET | Refills: 2 | Status: SHIPPED | OUTPATIENT
Start: 2022-05-02 | End: 2022-07-28

## 2022-05-02 NOTE — TELEPHONE ENCOUNTER
Patient stated Rx: Topamax & Phentermine are ordered by Ashley Dalton at [facility]

## 2022-05-02 NOTE — TELEPHONE ENCOUNTER
Patient calling into office to inform Dr Sohail Berg, she restarted the following medications today 5/2/22:  Rx: Topamax 25 mg  Rx: Phentermine 37 5mg

## 2022-05-03 ENCOUNTER — OFFICE VISIT (OUTPATIENT)
Dept: FAMILY MEDICINE CLINIC | Facility: CLINIC | Age: 22
End: 2022-05-03
Payer: MEDICARE

## 2022-05-03 VITALS
HEART RATE: 87 BPM | DIASTOLIC BLOOD PRESSURE: 70 MMHG | HEIGHT: 65 IN | OXYGEN SATURATION: 97 % | TEMPERATURE: 98.4 F | SYSTOLIC BLOOD PRESSURE: 100 MMHG | BODY MASS INDEX: 33.66 KG/M2 | WEIGHT: 202 LBS

## 2022-05-03 DIAGNOSIS — R30.0 DYSURIA: Primary | ICD-10-CM

## 2022-05-03 DIAGNOSIS — E66.09 CLASS 1 OBESITY DUE TO EXCESS CALORIES WITHOUT SERIOUS COMORBIDITY WITH BODY MASS INDEX (BMI) OF 33.0 TO 33.9 IN ADULT: ICD-10-CM

## 2022-05-03 PROBLEM — E66.811 CLASS 1 OBESITY DUE TO EXCESS CALORIES WITHOUT SERIOUS COMORBIDITY IN ADULT: Status: ACTIVE | Noted: 2020-07-16

## 2022-05-03 LAB
BILIRUB UR QL STRIP: NEGATIVE
CLARITY UR: CLEAR
COLOR UR: COLORLESS
GLUCOSE UR STRIP-MCNC: NEGATIVE MG/DL
HGB UR QL STRIP.AUTO: NEGATIVE
KETONES UR STRIP-MCNC: NEGATIVE MG/DL
LEUKOCYTE ESTERASE UR QL STRIP: NEGATIVE
NITRITE UR QL STRIP: NEGATIVE
PH UR STRIP.AUTO: 7 [PH]
PROT UR STRIP-MCNC: NEGATIVE MG/DL
SL AMB  POCT GLUCOSE, UA: NORMAL
SL AMB LEUKOCYTE ESTERASE,UA: NORMAL
SL AMB POCT BILIRUBIN,UA: NORMAL
SL AMB POCT BLOOD,UA: NORMAL
SL AMB POCT CLARITY,UA: CLEAR
SL AMB POCT COLOR,UA: NORMAL
SL AMB POCT KETONES,UA: NORMAL
SL AMB POCT NITRITE,UA: NORMAL
SL AMB POCT PH,UA: 7.5
SL AMB POCT SPECIFIC GRAVITY,UA: 1.02
SL AMB POCT URINE PROTEIN: NORMAL
SL AMB POCT UROBILINOGEN: 0.2
SP GR UR STRIP.AUTO: 1 (ref 1–1.03)
UROBILINOGEN UR STRIP-ACNC: <2 MG/DL

## 2022-05-03 PROCEDURE — 99214 OFFICE O/P EST MOD 30 MIN: CPT | Performed by: FAMILY MEDICINE

## 2022-05-03 PROCEDURE — 87086 URINE CULTURE/COLONY COUNT: CPT | Performed by: FAMILY MEDICINE

## 2022-05-03 PROCEDURE — 81002 URINALYSIS NONAUTO W/O SCOPE: CPT | Performed by: FAMILY MEDICINE

## 2022-05-03 PROCEDURE — 81003 URINALYSIS AUTO W/O SCOPE: CPT | Performed by: FAMILY MEDICINE

## 2022-05-03 NOTE — PROGRESS NOTES
Subjective:   Chief Complaint   Patient presents with    Difficulty Urinating        Patient ID: Maritza Peacock is a 19 y o  female  Patient here concerned about the burning sensation in urination increased frequency it has been going on for the last couple days no abdomen pain no flank pain no blood in the urine no vaginal discharge patient deny sexual activity      The following portions of the patient's history were reviewed and updated as appropriate: allergies, current medications, past family history, past medical history, past social history, past surgical history and problem list     Review of Systems   Constitutional: Negative for activity change, appetite change, fatigue and fever  HENT: Negative for congestion, ear pain, sinus pressure, sinus pain and sore throat  Eyes: Negative for pain, discharge, redness and itching  Respiratory: Negative for cough, chest tightness, shortness of breath and stridor  Cardiovascular: Negative for chest pain, palpitations and leg swelling  Gastrointestinal: Negative for abdominal pain, blood in stool, constipation, diarrhea and nausea  Genitourinary: Positive for dysuria and frequency  Negative for flank pain and hematuria  Musculoskeletal: Negative for back pain, joint swelling and neck pain  Skin: Negative for pallor and rash  Neurological: Negative for dizziness, tremors, weakness, numbness and headaches  Hematological: Does not bruise/bleed easily  Objective:  Vitals:    05/03/22 1025   BP: 100/70   Pulse: 87   Temp: 98 4 °F (36 9 °C)   TempSrc: Tympanic   SpO2: 97%   Weight: 91 6 kg (202 lb)   Height: 5' 5" (1 651 m)      Physical Exam  Vitals and nursing note reviewed  Constitutional:       General: She is not in acute distress  Appearance: She is well-developed  She is not diaphoretic  HENT:      Head: Normocephalic        Right Ear: Tympanic membrane, ear canal and external ear normal       Left Ear: Tympanic membrane, ear canal and external ear normal       Nose: Nose normal  No congestion or rhinorrhea  Mouth/Throat:      Mouth: Mucous membranes are moist       Pharynx: Oropharynx is clear  No oropharyngeal exudate or posterior oropharyngeal erythema  Eyes:      General:         Right eye: No discharge  Left eye: No discharge  Conjunctiva/sclera: Conjunctivae normal       Pupils: Pupils are equal, round, and reactive to light  Neck:      Vascular: No JVD  Cardiovascular:      Rate and Rhythm: Normal rate and regular rhythm  Heart sounds: Normal heart sounds  No murmur heard  No gallop  Pulmonary:      Effort: Pulmonary effort is normal  No respiratory distress  Breath sounds: Normal breath sounds  No stridor  No wheezing or rales  Chest:      Chest wall: No tenderness  Abdominal:      General: There is no distension  Palpations: Abdomen is soft  There is no mass  Tenderness: There is no abdominal tenderness  There is no rebound  Musculoskeletal:         General: No tenderness  Cervical back: Normal range of motion and neck supple  Lymphadenopathy:      Cervical: No cervical adenopathy  Skin:     General: Skin is warm  Findings: No erythema or rash  Neurological:      Mental Status: She is alert and oriented to person, place, and time  Motor: No weakness        Gait: Gait normal            Assessment/Plan:    Dysuria  Acute symptomatic associated with frequency urination no abdomen pain no flank pain no blood in the urine the last menstruation April 25th no vaginal discharge urine dip in the office is negative the discussed the patient with send for culture came back positive will start antibiotic    Class 1 obesity due to excess calories without serious comorbidity in adult  Chronic improve in the hemoglobin A1c compared with before a since patient start metformin her weight down from 216 to 202 lB  Recommend to continue current management low carb low-fat diet and increased physical activity       Diagnoses and all orders for this visit:    Dysuria  -     Urine culture  -     UA (URINE) with reflex to Scope  -     POCT urine dip    Class 1 obesity due to excess calories without serious comorbidity with body mass index (BMI) of 33 0 to 33 9 in adult

## 2022-05-03 NOTE — ASSESSMENT & PLAN NOTE
Acute symptomatic associated with frequency urination no abdomen pain no flank pain no blood in the urine the last menstruation April 25th no vaginal discharge urine dip in the office is negative the discussed the patient with send for culture came back positive will start antibiotic

## 2022-05-03 NOTE — ASSESSMENT & PLAN NOTE
Chronic improve in the hemoglobin A1c compared with before a since patient start metformin her weight down from 216 to 202 lB  Recommend to continue current management low carb low-fat diet and increased physical activity

## 2022-05-05 ENCOUNTER — OFFICE VISIT (OUTPATIENT)
Dept: UROLOGY | Facility: AMBULATORY SURGERY CENTER | Age: 22
End: 2022-05-05
Payer: MEDICARE

## 2022-05-05 VITALS
DIASTOLIC BLOOD PRESSURE: 78 MMHG | WEIGHT: 207 LBS | BODY MASS INDEX: 34.49 KG/M2 | HEART RATE: 88 BPM | SYSTOLIC BLOOD PRESSURE: 110 MMHG | HEIGHT: 65 IN

## 2022-05-05 DIAGNOSIS — R30.0 DYSURIA: ICD-10-CM

## 2022-05-05 DIAGNOSIS — N32.81 OVERACTIVE BLADDER: ICD-10-CM

## 2022-05-05 DIAGNOSIS — N32.81 OAB (OVERACTIVE BLADDER): Primary | ICD-10-CM

## 2022-05-05 LAB — POST-VOID RESIDUAL VOLUME, ML POC: 34 ML

## 2022-05-05 PROCEDURE — 51798 US URINE CAPACITY MEASURE: CPT | Performed by: UROLOGY

## 2022-05-05 PROCEDURE — 99214 OFFICE O/P EST MOD 30 MIN: CPT | Performed by: UROLOGY

## 2022-05-05 NOTE — PATIENT INSTRUCTIONS
Interstitial Cystitis   AMBULATORY CARE:   Interstitial cystitis  (IC) is also called painful bladder syndrome  IC is a condition that causes pain in your bladder and pelvic area  You may also have ulcers in your bladder  The cause of IC is not known  Common signs and symptoms include the following:   · Pressure in your bladder and pelvic area    · Urgent need to urinate    · Urinating more often or waking to urinate    · Increased pain during menstruation in women    · Pain in the penis or scrotum in men    · Pain during sex    Call your doctor if:   · Your symptoms get worse, or you develop new symptoms  · You have questions or concerns about your condition or care  Treatment:  The goal of treatment is to control your symptoms  Your healthcare provider may recommend any of the following:  · Nutrition changes  may be needed  Foods such as oranges, seun, tomatoes, chocolate, alcohol, spicy foods, soft drinks, and coffee may worsen your symptoms  · Medicines  may be given to decrease symptoms such as pain or the need to urinate urgently or often  These medicines may be taken by mouth or placed directly into your bladder  Antibiotics may be given for 1 to 5 days to treat a bacterial infection  · Bladder distension  is a procedure to stretch the walls of your bladder using gas or fluid  · Electrical stimulation  helps increase blood flow to your bladder and strengthens the muscles that control your bladder  Mild electrical pulses are sent to the nerves in your bladder  The impulses may also help release hormones that block pain  · Surgery  may be done to remove ulcers in your bladder or make your bladder larger  Damaged areas of your bladder may be removed and replaced with tissue from your large intestine  Manage your symptoms:   · Do Kegel exercises as directed  Kegel exercises will help strengthen the muscles that control bowel movements and urination   Ask your healthcare provider for more information on Kegel exercises  Tighten your pelvic muscles slowly  It may feel like you are trying to hold back urine or gas  Hold these muscles and count to 3  Relax, tighten them quickly, and release  Repeat the cycle 10 times  Do 10 sets of Kegel exercises, 5 times a day  Do not hold your breath when you do Kegel exercises  Keep your stomach, back, and leg muscles relaxed  · Do not smoke  Smoking may worsen your symptoms  Nicotine and other chemicals in cigarettes and cigars can also cause lung damage  Ask your healthcare provider for information if you currently smoke and need help to quit  E-cigarettes or smokeless tobacco still contain nicotine  Talk to your healthcare provider before you use these products  · Train your bladder to urinate less often  This can be done by going to the bathroom at scheduled times  Try to hold your urine when you feel the urge to go  For example, hold your urine for 5 minutes when you feel the urge to go  As that becomes easier, hold your urine for 10 minutes  · Manage stress  Stress may worsen symptoms  Your healthcare provider may recommend you find ways to manage stress, such as relaxation techniques  Follow up with your doctor as directed:  Write down your questions so you remember to ask them during your visits  © Copyright [employer] 2022 Information is for End User's use only and may not be sold, redistributed or otherwise used for commercial purposes  All illustrations and images included in CareNotes® are the copyrighted property of [employer]  or Lorena Castro   The above information is an  only  It is not intended as medical advice for individual conditions or treatments  Talk to your doctor, nurse or pharmacist before following any medical regimen to see if it is safe and effective for you

## 2022-05-05 NOTE — ASSESSMENT & PLAN NOTE
Multiple urinalyses and cultures have not shown infection (has had 1 possible benign alpha hemolytic strep)  Will assess further with cystoscopy    Suspect diagnosis of interstitial cystitis

## 2022-05-05 NOTE — PROGRESS NOTES
Assessment/Plan:    Dysuria  Multiple urinalyses and cultures have not shown infection (has had 1 possible benign alpha hemolytic strep)  Will assess further with cystoscopy  Suspect diagnosis of interstitial cystitis    Overactive bladder  She is bothered by urgency and frequency issues nocturia although these seem to come and go which would not be typical of overactive bladder  This is more consistent with interstitial cystitis issues but she is not having bladder pain  I asked her to do some reading on IC and let us know if she thinks this is consistent with her symptoms  We discussed cystoscopy to rule out other causes and urodynamics   She is comfortable moving forward with cystoscopy  Subjective:      Patient ID: Tona Fox is a 19 y o  female  HPI    Tona Fox is a 19 y o  female here for follow up evaluation of  urgency frequency issues  She was seen in July 2021 reporting flares where she has increased urgency and frequency  and nocturia x5  These are not consistent every day issues but seemed to come and go and often associated with mild dysuria  She was put on Ditropan  She was recommended Kegel's but did not do them (although she later says she does)  Patient went to urgent care 12/28/2021 for the complaint of urinary frequency and urgency where she was put on empiric antibiotics but urine culture was later negative  she again visit emergency room in January 2022 for similar issues  When seen in follow-up in January 2022 in Urology Clinic she said Ditropan had may be been helping, she was changed Mirabegron  PVR 54 cc  She was scheduled to have urodynamics  She reported constipation issues for Mirabegron and was changed to detrol and not sure if this is helping and had constipation issues so stopped it  She did have an alpha hemolytic strep UTI in March April 2022 RBUS was normal with PVR of 60cc      Today she reports she has had a recent flare of urgency and frequency issues  She was seen by her PCP in urinalysis performed in was again unremarkable  Denies constipation  PVR normal today  Past Surgical History:   Procedure Laterality Date    COLONOSCOPY      x several; last one 2/2020    WISDOM TOOTH EXTRACTION          Past Medical History:   Diagnosis Date    Crohn's disease ([facility] [address] )     Fever 12/8/2021    Hidradenitis suppurativa     thighs    Obesity, Class II, BMI 35-39 9     TMJ (temporomandibular joint disorder)     last assessed 02/24/2016    Urinary tract infection     Varicella     vaccine             Review of Systems   Constitutional: Negative for chills and fever  HENT: Negative for ear pain and sore throat  Eyes: Negative for pain and visual disturbance  Respiratory: Negative for cough and shortness of breath  Cardiovascular: Negative for chest pain and palpitations  Gastrointestinal: Negative for abdominal pain and vomiting  Genitourinary: Negative for dysuria and hematuria  Musculoskeletal: Negative for arthralgias and back pain  Skin: Negative for color change and rash  Neurological: Negative for seizures and syncope  All other systems reviewed and are negative  Objective:      /78   Pulse 88   Ht 5' 5" (1 651 m)   Wt 93 9 kg (207 lb)   LMP 04/25/2022 (Exact Date)   BMI 34 45 kg/m²     No results found for: PSA       Physical Exam  Vitals reviewed  Constitutional:       General: She is not in acute distress  Appearance: Normal appearance  She is not ill-appearing, toxic-appearing or diaphoretic  HENT:      Head: Normocephalic and atraumatic  Eyes:      Extraocular Movements: Extraocular movements intact  Pupils: Pupils are equal, round, and reactive to light  Pulmonary:      Effort: Pulmonary effort is normal    Abdominal:      General: Abdomen is flat  There is no distension  Palpations: Abdomen is soft  There is no mass  Tenderness:  There is no abdominal tenderness  There is no guarding or rebound  Hernia: No hernia is present  Skin:     General: Skin is warm  Neurological:      General: No focal deficit present  Mental Status: She is alert and oriented to person, place, and time  Mental status is at baseline  Psychiatric:         Mood and Affect: Mood normal          Behavior: Behavior normal          Thought Content:  Thought content normal            Orders  Orders Placed This Encounter   Procedures    POCT Measure PVR

## 2022-05-05 NOTE — ASSESSMENT & PLAN NOTE
She is bothered by urgency and frequency issues nocturia although these seem to come and go which would not be typical of overactive bladder  This is more consistent with interstitial cystitis issues but she is not having bladder pain  I asked her to do some reading on IC and let us know if she thinks this is consistent with her symptoms  We discussed cystoscopy to rule out other causes and urodynamics   She is comfortable moving forward with cystoscopy

## 2022-05-06 LAB — BACTERIA UR CULT: ABNORMAL

## 2022-05-13 ENCOUNTER — APPOINTMENT (OUTPATIENT)
Dept: LAB | Facility: HOSPITAL | Age: 22
End: 2022-05-13
Payer: MEDICARE

## 2022-05-13 DIAGNOSIS — K51.919 CHRONIC ULCERATIVE COLITIS WITH COMPLICATION, UNSPECIFIED LOCATION (HCC): ICD-10-CM

## 2022-05-13 LAB — HBV SURFACE AG SER QL: NORMAL

## 2022-05-13 PROCEDURE — 84445 ASSAY OF TSI GLOBULIN: CPT | Performed by: INTERNAL MEDICINE

## 2022-05-13 PROCEDURE — 36415 COLL VENOUS BLD VENIPUNCTURE: CPT | Performed by: INTERNAL MEDICINE

## 2022-05-13 PROCEDURE — 87340 HEPATITIS B SURFACE AG IA: CPT

## 2022-05-13 PROCEDURE — 86480 TB TEST CELL IMMUN MEASURE: CPT

## 2022-05-16 LAB
GAMMA INTERFERON BACKGROUND BLD IA-ACNC: 0.02 IU/ML
M TB IFN-G BLD-IMP: NEGATIVE
M TB IFN-G CD4+ BCKGRND COR BLD-ACNC: 0 IU/ML
M TB IFN-G CD4+ BCKGRND COR BLD-ACNC: 0 IU/ML
MITOGEN IGNF BCKGRD COR BLD-ACNC: >10 IU/ML

## 2022-05-17 LAB — TSI SER-ACNC: <0.1 IU/L (ref 0–0.55)

## 2022-06-01 ENCOUNTER — OFFICE VISIT (OUTPATIENT)
Dept: FAMILY MEDICINE CLINIC | Facility: CLINIC | Age: 22
End: 2022-06-01
Payer: MEDICARE

## 2022-06-01 VITALS
HEART RATE: 85 BPM | SYSTOLIC BLOOD PRESSURE: 110 MMHG | BODY MASS INDEX: 34.62 KG/M2 | DIASTOLIC BLOOD PRESSURE: 62 MMHG | HEIGHT: 65 IN | WEIGHT: 207.8 LBS | TEMPERATURE: 98.7 F | OXYGEN SATURATION: 98 %

## 2022-06-01 DIAGNOSIS — J02.9 SORE THROAT: Primary | ICD-10-CM

## 2022-06-01 DIAGNOSIS — R09.81 NASAL CONGESTION: ICD-10-CM

## 2022-06-01 DIAGNOSIS — L70.0 ACNE VULGARIS: ICD-10-CM

## 2022-06-01 PROCEDURE — 99214 OFFICE O/P EST MOD 30 MIN: CPT | Performed by: FAMILY MEDICINE

## 2022-06-01 PROCEDURE — 87070 CULTURE OTHR SPECIMN AEROBIC: CPT | Performed by: FAMILY MEDICINE

## 2022-06-01 PROCEDURE — 87147 CULTURE TYPE IMMUNOLOGIC: CPT | Performed by: FAMILY MEDICINE

## 2022-06-01 RX ORDER — ADAPALENE 0.1 G/100G
CREAM TOPICAL
Qty: 45 G | Refills: 0 | Status: SHIPPED | OUTPATIENT
Start: 2022-06-01 | End: 2022-06-01

## 2022-06-01 RX ORDER — CLINDAMYCIN PHOSPHATE 10 MG/ML
SOLUTION TOPICAL
COMMUNITY
Start: 2022-05-27 | End: 2022-06-01

## 2022-06-01 RX ORDER — CLINDAMYCIN PHOSPHATE 10 MG/G
GEL TOPICAL 2 TIMES DAILY
Qty: 30 G | Refills: 0 | Status: SHIPPED | OUTPATIENT
Start: 2022-06-01

## 2022-06-01 RX ORDER — FLUTICASONE PROPIONATE 50 MCG
2 SPRAY, SUSPENSION (ML) NASAL DAILY
Qty: 16 G | Refills: 0 | Status: SHIPPED | OUTPATIENT
Start: 2022-06-01 | End: 2022-06-28

## 2022-06-01 NOTE — ASSESSMENT & PLAN NOTE
Symptomatic not controlled patient already seen by Dermatology put her clindamycin wipes and feel it is not working a radiate her the skin more recommend start the clindamycin wipes we start her on clindamycin gel 1 percent apply twice a day also recommend the and Differin cream to apply it at nighttime proper use and possible side effect discussed the patient will follow-up with the patient in 3 months

## 2022-06-01 NOTE — ASSESSMENT & PLAN NOTE
New diagnosis acute symptomatic recommend patient start Flonase nasal spray 2 spray each nostril once a day for 2 weeks proper use and possible side effect discussed the patient

## 2022-06-01 NOTE — PROGRESS NOTES
Subjective:   Chief Complaint   Patient presents with    Cold Like Symptoms     Sore throat , Dry Cough , Stuffy Nose , Mucus appox 3 days          Patient ID: Jose Cruz Kee is a 19 y o  female  Patient here concerned about the sore throat it has been going on for 3 days no fever no body ache the positive for nasal congestion stuffy nose postnasal drip and difficulty with swallowing no rash no joint pain the patient test the for COVID at home 2 times negative  Also patient concerned about acne on her face she seen Dermatology before the giver clindamycin wipes and she does not feel it is work it is the make it worse      The following portions of the patient's history were reviewed and updated as appropriate: allergies, current medications, past family history, past medical history, past social history, past surgical history and problem list     Review of Systems   Constitutional: Negative for activity change, appetite change, fatigue and fever  HENT: Positive for congestion, postnasal drip, sore throat and trouble swallowing  Negative for ear pain, sinus pressure and sinus pain  Eyes: Negative for pain, discharge, redness and itching  Respiratory: Negative for cough, chest tightness, shortness of breath and stridor  Cardiovascular: Negative for chest pain, palpitations and leg swelling  Gastrointestinal: Negative for abdominal pain, blood in stool, constipation, diarrhea and nausea  Genitourinary: Negative for dysuria, flank pain, frequency and hematuria  Musculoskeletal: Negative for back pain, joint swelling and neck pain  Skin:        Acne on face   Neurological: Negative for dizziness, tremors, weakness, numbness and headaches  Hematological: Does not bruise/bleed easily               Objective:  Vitals:    06/01/22 0939   BP: 110/62   BP Location: Left arm   Patient Position: Sitting   Cuff Size: Standard   Pulse: 85   Temp: 98 7 °F (37 1 °C)   TempSrc: Tympanic   SpO2: 98% Weight: 94 3 kg (207 lb 12 8 oz)   Height: 5' 5" (1 651 m)      Physical Exam  Vitals and nursing note reviewed  Constitutional:       General: She is not in acute distress  Appearance: She is well-developed  She is not diaphoretic  HENT:      Head: Normocephalic  Right Ear: Tympanic membrane, ear canal and external ear normal       Left Ear: Tympanic membrane, ear canal and external ear normal       Nose: Nose normal  No congestion or rhinorrhea  Mouth/Throat:      Mouth: Mucous membranes are moist       Pharynx: Oropharynx is clear  Posterior oropharyngeal erythema present  No oropharyngeal exudate  Eyes:      General:         Right eye: No discharge  Left eye: No discharge  Conjunctiva/sclera: Conjunctivae normal       Pupils: Pupils are equal, round, and reactive to light  Neck:      Vascular: No JVD  Cardiovascular:      Rate and Rhythm: Normal rate and regular rhythm  Heart sounds: Normal heart sounds  No murmur heard  No gallop  Pulmonary:      Effort: Pulmonary effort is normal  No respiratory distress  Breath sounds: Normal breath sounds  No stridor  No wheezing or rales  Chest:      Chest wall: No tenderness  Abdominal:      General: There is no distension  Palpations: Abdomen is soft  There is no mass  Tenderness: There is no abdominal tenderness  There is no rebound  Musculoskeletal:         General: No tenderness  Cervical back: Normal range of motion and neck supple  Lymphadenopathy:      Cervical: No cervical adenopathy  Skin:     General: Skin is warm  Comments: Acne on B/L cheeks   Neurological:      Mental Status: She is alert and oriented to person, place, and time  Motor: No weakness        Gait: Gait normal            Assessment/Plan:    Sore throat  New diagnosis symptomatic has been going on for years 3 days affecting her swallowing couple COVID test at home was negative on the physical exam erythema and swelling recommend throat culture done in the office today and send out if it is positive will start her on antibiotic discussed with the patient agree recommend supportive treatment    Nasal congestion  New diagnosis acute symptomatic recommend patient start Flonase nasal spray 2 spray each nostril once a day for 2 weeks proper use and possible side effect discussed the patient    Acne vulgaris  Symptomatic not controlled patient already seen by Dermatology put her clindamycin wipes and feel it is not working a radiate her the skin more recommend start the clindamycin wipes we start her on clindamycin gel 1 percent apply twice a day also recommend the and Differin cream to apply it at nighttime proper use and possible side effect discussed the patient will follow-up with the patient in 3 months       Diagnoses and all orders for this visit:    Sore throat  -     Throat culture; Future  -     Throat culture    Nasal congestion  -     fluticasone (FLONASE) 50 mcg/act nasal spray; 2 sprays into each nostril daily  -     Throat culture; Future  -     Throat culture    Acne vulgaris  -     clindamycin (CLINDAGEL) 1 % gel;  Apply topically 2 (two) times a day  -     adapalene (DIFFERIN) 0 1 % cream; Apply topically daily at bedtime    Other orders  -     Discontinue: clindamycin (CLEOCIN T) 1 %

## 2022-06-01 NOTE — ASSESSMENT & PLAN NOTE
New diagnosis symptomatic has been going on for years 3 days affecting her swallowing couple COVID test at home was negative on the physical exam erythema and swelling recommend throat culture done in the office today and send out if it is positive will start her on antibiotic discussed with the patient agree recommend supportive treatment

## 2022-06-03 DIAGNOSIS — J02.0 STREP THROAT: Primary | ICD-10-CM

## 2022-06-03 LAB — BACTERIA THROAT CULT: ABNORMAL

## 2022-06-03 RX ORDER — AMOXICILLIN 500 MG/1
500 TABLET, FILM COATED ORAL 2 TIMES DAILY
Qty: 14 TABLET | Refills: 0 | Status: SHIPPED | OUTPATIENT
Start: 2022-06-03 | End: 2022-06-10

## 2022-06-03 NOTE — TELEPHONE ENCOUNTER
----- Message from [facility], [address]  sent at 6/3/2022  8:54 AM EDT -----  Positive for strep will recommend start amox 500mg BID x 7 days to be called to pharmacy

## 2022-06-17 ENCOUNTER — TELEPHONE (OUTPATIENT)
Dept: UROLOGY | Facility: AMBULATORY SURGERY CENTER | Age: 22
End: 2022-06-17

## 2022-06-28 DIAGNOSIS — R09.81 NASAL CONGESTION: ICD-10-CM

## 2022-06-28 RX ORDER — FLUTICASONE PROPIONATE 50 MCG
SPRAY, SUSPENSION (ML) NASAL
Qty: 16 ML | Refills: 3 | Status: SHIPPED | OUTPATIENT
Start: 2022-06-28

## 2022-07-11 ENCOUNTER — OFFICE VISIT (OUTPATIENT)
Dept: FAMILY MEDICINE CLINIC | Facility: CLINIC | Age: 22
End: 2022-07-11
Payer: MEDICARE

## 2022-07-11 VITALS
DIASTOLIC BLOOD PRESSURE: 70 MMHG | TEMPERATURE: 98.4 F | SYSTOLIC BLOOD PRESSURE: 110 MMHG | BODY MASS INDEX: 34.49 KG/M2 | OXYGEN SATURATION: 99 % | WEIGHT: 207 LBS | HEART RATE: 80 BPM | HEIGHT: 65 IN

## 2022-07-11 DIAGNOSIS — R35.0 FREQUENCY OF URINATION: ICD-10-CM

## 2022-07-11 DIAGNOSIS — B96.89 BACTERIAL VAGINOSIS: Primary | ICD-10-CM

## 2022-07-11 DIAGNOSIS — N76.0 BACTERIAL VAGINOSIS: Primary | ICD-10-CM

## 2022-07-11 LAB
BILIRUB UR QL STRIP: NEGATIVE
CLARITY UR: CLEAR
COLOR UR: COLORLESS
GLUCOSE UR STRIP-MCNC: NEGATIVE MG/DL
HGB UR QL STRIP.AUTO: NEGATIVE
KETONES UR STRIP-MCNC: NEGATIVE MG/DL
LEUKOCYTE ESTERASE UR QL STRIP: NEGATIVE
NITRITE UR QL STRIP: NEGATIVE
PH UR STRIP.AUTO: 7 [PH]
PROT UR STRIP-MCNC: NEGATIVE MG/DL
SL AMB  POCT GLUCOSE, UA: NORMAL
SL AMB LEUKOCYTE ESTERASE,UA: NORMAL
SL AMB POCT BILIRUBIN,UA: NORMAL
SL AMB POCT BLOOD,UA: NORMAL
SL AMB POCT CLARITY,UA: CLEAR
SL AMB POCT COLOR,UA: YELLOW
SL AMB POCT KETONES,UA: NORMAL
SL AMB POCT NITRITE,UA: NORMAL
SL AMB POCT PH,UA: 7
SL AMB POCT SPECIFIC GRAVITY,UA: 1
SL AMB POCT URINE PROTEIN: NORMAL
SL AMB POCT UROBILINOGEN: 0.2
SP GR UR STRIP.AUTO: 1.01 (ref 1–1.03)
UROBILINOGEN UR STRIP-ACNC: <2 MG/DL

## 2022-07-11 PROCEDURE — 87086 URINE CULTURE/COLONY COUNT: CPT | Performed by: FAMILY MEDICINE

## 2022-07-11 PROCEDURE — 99214 OFFICE O/P EST MOD 30 MIN: CPT | Performed by: FAMILY MEDICINE

## 2022-07-11 PROCEDURE — 87210 SMEAR WET MOUNT SALINE/INK: CPT | Performed by: FAMILY MEDICINE

## 2022-07-11 PROCEDURE — 81002 URINALYSIS NONAUTO W/O SCOPE: CPT | Performed by: FAMILY MEDICINE

## 2022-07-11 PROCEDURE — 81003 URINALYSIS AUTO W/O SCOPE: CPT | Performed by: FAMILY MEDICINE

## 2022-07-11 RX ORDER — METRONIDAZOLE 500 MG/1
500 TABLET ORAL EVERY 12 HOURS SCHEDULED
Qty: 14 TABLET | Refills: 0 | Status: SHIPPED | OUTPATIENT
Start: 2022-07-11 | End: 2022-07-18

## 2022-07-11 RX ORDER — TOPIRAMATE 25 MG/1
25 TABLET ORAL 2 TIMES DAILY
COMMUNITY
Start: 2022-07-05 | End: 2022-08-05 | Stop reason: ALTCHOICE

## 2022-07-11 NOTE — PROGRESS NOTES
Subjective:   Chief Complaint   Patient presents with    Urinary Frequency     With odor        Patient ID: Lucille Christiansen is a 19 y o  female  The patient here concerned about the bad odor of the urine started after her menstruation and the a deny any vaginal she there is a vaginal discharge patient deny using any scented the pad no new kind of bad no abdomen pain no blood in the urine no flank pain no fever she is not sexually active      The following portions of the patient's history were reviewed and updated as appropriate: allergies, current medications, past family history, past medical history, past social history, past surgical history and problem list     Review of Systems   Constitutional: Negative for activity change, appetite change, fatigue and fever  HENT: Negative for congestion, ear pain, sinus pressure, sinus pain and sore throat  Eyes: Negative for pain, discharge, redness and itching  Respiratory: Negative for cough, chest tightness, shortness of breath and stridor  Cardiovascular: Negative for chest pain, palpitations and leg swelling  Gastrointestinal: Negative for abdominal pain, blood in stool, constipation, diarrhea and nausea  Genitourinary: Positive for frequency and vaginal discharge  Negative for dysuria, flank pain and hematuria  Musculoskeletal: Negative for back pain, joint swelling and neck pain  Skin: Negative for pallor and rash  Neurological: Negative for dizziness, tremors, weakness, numbness and headaches  Hematological: Does not bruise/bleed easily  Objective:  Vitals:    07/11/22 0930   BP: 110/70   Pulse: 80   Temp: 98 4 °F (36 9 °C)   TempSrc: Tympanic   SpO2: 99%   Weight: 93 9 kg (207 lb)   Height: 5' 4 75" (1 645 m)      Physical Exam  Vitals and nursing note reviewed  Constitutional:       General: She is not in acute distress  Appearance: She is well-developed  HENT:      Head: Normocephalic and atraumatic  Mouth/Throat:      Pharynx: No oropharyngeal exudate  Eyes:      Conjunctiva/sclera: Conjunctivae normal    Neck:      Vascular: No JVD  Cardiovascular:      Rate and Rhythm: Normal rate and regular rhythm  Heart sounds: Normal heart sounds  No murmur heard  No friction rub  No gallop  Pulmonary:      Effort: Pulmonary effort is normal       Breath sounds: Normal breath sounds  Abdominal:      General: Bowel sounds are normal       Palpations: Abdomen is soft  Tenderness: There is no abdominal tenderness  Genitourinary:     Vagina: Vaginal discharge present  Neurological:      Mental Status: She is oriented to person, place, and time  Assessment/Plan:    Bacterial vaginosis  New diagnosis symptomatic wet mount positive for clue cells recommend Flagyl 500 twice a day for 7 days proper use and possible side effect discussed the patient    Frequency of urination  Symptomatic with bad order of the urine urine dip in the office negative will send for UA and CS       Diagnoses and all orders for this visit:    Bacterial vaginosis  -     metroNIDAZOLE (FLAGYL) 500 mg tablet; Take 1 tablet (500 mg total) by mouth every 12 (twelve) hours for 7 days  -     POCT wet mount    Frequency of urination  -     Urine culture  -     UA (URINE) with reflex to Scope  -     POCT urine dip    Other orders  -     topiramate (TOPAMAX) 25 mg tablet;  Take 25 mg by mouth 2 (two) times a day

## 2022-07-12 ENCOUNTER — HOSPITAL ENCOUNTER (EMERGENCY)
Facility: HOSPITAL | Age: 22
Discharge: HOME | End: 2022-07-12
Attending: EMERGENCY MEDICINE | Admitting: EMERGENCY MEDICINE
Payer: MEDICARE

## 2022-07-12 ENCOUNTER — APPOINTMENT (EMERGENCY)
Dept: RADIOLOGY | Facility: HOSPITAL | Age: 22
End: 2022-07-12
Payer: MEDICARE

## 2022-07-12 VITALS
SYSTOLIC BLOOD PRESSURE: 135 MMHG | RESPIRATION RATE: 15 BRPM | HEART RATE: 85 BPM | WEIGHT: 208 LBS | DIASTOLIC BLOOD PRESSURE: 76 MMHG | BODY MASS INDEX: 34.88 KG/M2 | OXYGEN SATURATION: 100 % | TEMPERATURE: 97.8 F

## 2022-07-12 DIAGNOSIS — S43.401A SPRAIN OF RIGHT SHOULDER: Primary | ICD-10-CM

## 2022-07-12 PROBLEM — B96.89 BACTERIAL VAGINOSIS: Status: ACTIVE | Noted: 2022-07-12

## 2022-07-12 PROBLEM — J02.9 SORE THROAT: Status: RESOLVED | Noted: 2022-06-01 | Resolved: 2022-07-12

## 2022-07-12 PROBLEM — R35.0 FREQUENCY OF URINATION: Status: ACTIVE | Noted: 2022-07-11

## 2022-07-12 PROBLEM — B96.89 BACTERIAL VAGINOSIS: Status: ACTIVE | Noted: 2022-07-11

## 2022-07-12 PROBLEM — N76.0 BACTERIAL VAGINOSIS: Status: ACTIVE | Noted: 2022-07-12

## 2022-07-12 PROBLEM — N76.0 BACTERIAL VAGINOSIS: Status: ACTIVE | Noted: 2022-07-11

## 2022-07-12 PROBLEM — R35.0 FREQUENCY OF URINATION: Status: ACTIVE | Noted: 2022-07-12

## 2022-07-12 LAB
BV WHIFF TEST VAG QL: ABNORMAL
CLUE CELLS SPEC QL WET PREP: ABNORMAL
PH SMN: ABNORMAL [PH]
SL AMB POCT WET MOUNT: ABNORMAL
T VAGINALIS VAG QL WET PREP: ABNORMAL
YEAST VAG QL WET PREP: ABNORMAL

## 2022-07-12 PROCEDURE — 99284 EMERGENCY DEPT VISIT MOD MDM: CPT | Performed by: EMERGENCY MEDICINE

## 2022-07-12 PROCEDURE — 99283 EMERGENCY DEPT VISIT LOW MDM: CPT

## 2022-07-12 PROCEDURE — 73030 X-RAY EXAM OF SHOULDER: CPT

## 2022-07-12 NOTE — Clinical Note
Debora Sagarca was seen and treated in our emergency department on 7/12/2022  Diagnosis:     Isauro Ramirez  may return to work on return date  She may return on this date: 07/14/2022         If you have any questions or concerns, please don't hesitate to call        Clare Guevara MD    ______________________________           _______________          _______________  Hospital Representative                              Date                                Time

## 2022-07-12 NOTE — ASSESSMENT & PLAN NOTE
New diagnosis symptomatic wet mount positive for clue cells recommend Flagyl 500 twice a day for 7 days proper use and possible side effect discussed the patient

## 2022-07-12 NOTE — ED PROVIDER NOTES
History  Chief Complaint   Patient presents with    Shoulder Pain     Reports hearing a crack and feeling pain to right shoulder yesterday when she bent over to pick something up     Patient is a 29-year-old female  Patient reports that early this morning she was reaching down to  something off the floor when she felt a crack in her right shoulder  She has been complaining of pain back near her right trapezius  She is having pain with movement of the right shoulder  No associated motor or sensory complaints  No chest pain or shortness of breath  Prior to Admission Medications   Prescriptions Last Dose Informant Patient Reported? Taking?    Multiple Vitamins-Minerals (HAIR SKIN AND NAILS FORMULA PO)  Self Yes No   Sig: Take by mouth   Stelara 45 MG/0 5ML injection  Self Yes No   acetaminophen (TYLENOL) 500 mg tablet  Self No No   Sig: Take 1 tablet (500 mg total) by mouth every 6 (six) hours as needed (pain)   clindamycin (CLINDAGEL) 1 % gel Past Month at Unknown time Self No Yes   Sig: Apply topically 2 (two) times a day   docusate sodium (COLACE) 100 mg capsule Not Taking at Unknown time Self No No   Sig: Take 1 capsule (100 mg total) by mouth in the morning   Patient not taking: Reported on 7/12/2022   fluticasone (FLONASE) 50 mcg/act nasal spray Past Month at Unknown time Self No Yes   Sig: SPRAY 2 SPRAYS INTO EACH NOSTRIL EVERY DAY   metFORMIN (GLUCOPHAGE-XR) 500 mg 24 hr tablet  Self No No   Sig: TAKE 1 TABLET BY MOUTH EVERY DAY WITH DINNER   metroNIDAZOLE (FLAGYL) 500 mg tablet   No No   Sig: Take 1 tablet (500 mg total) by mouth every 12 (twelve) hours for 7 days   polyethylene glycol (MIRALAX) 17 g packet Not Taking at Unknown time Self No No   Sig: Take 17 g by mouth daily   Patient not taking: Reported on 7/12/2022   topiramate (TOPAMAX) 25 mg tablet  Self Yes No   Sig: Take 25 mg by mouth 2 (two) times a day      Facility-Administered Medications: None       Past Medical History: Diagnosis Date    Crohn's disease ([facility] [address] )     Fever 12/8/2021    Hidradenitis suppurativa     thighs    Obesity, Class II, BMI 35-39 9     TMJ (temporomandibular joint disorder)     last assessed 02/24/2016    Urinary tract infection     Varicella     vaccine       Past Surgical History:   Procedure Laterality Date    COLONOSCOPY      x several; last one 2/2020    WISDOM TOOTH EXTRACTION         Family History   Problem Relation Age of Onset    Diabetes Mother         Due to underlying condition with foot ulcer  Type 2 DM without complication   Diabetes Father     Polycystic ovary syndrome Sister     Stroke Neg Hx     Heart attack Neg Hx     Hypertension Neg Hx     Breast cancer Neg Hx     Colon cancer Neg Hx     Ovarian cancer Neg Hx     Uterine cancer Neg Hx      I have reviewed and agree with the history as documented  E-Cigarette/Vaping    E-Cigarette Use Never User      E-Cigarette/Vaping Substances    Nicotine No     THC No     CBD No     Flavoring No     Other No     Unknown No      Social History     Tobacco Use    Smoking status: Never Smoker    Smokeless tobacco: Never Used    Tobacco comment: hooka smoke   Vaping Use    Vaping Use: Never used   Substance Use Topics    Alcohol use: No    Drug use: No       Review of Systems   Constitutional: Negative for chills and fever  HENT: Negative for rhinorrhea and sore throat  Eyes: Negative for pain, redness and visual disturbance  Respiratory: Negative for cough and shortness of breath  Cardiovascular: Negative for chest pain and leg swelling  Gastrointestinal: Negative for abdominal pain, diarrhea and vomiting  Endocrine: Negative for polydipsia and polyuria  Genitourinary: Negative for dysuria, frequency, hematuria, vaginal bleeding and vaginal discharge  Musculoskeletal: Negative for back pain and neck pain  Skin: Negative for rash and wound  Allergic/Immunologic: Negative for immunocompromised state  Neurological: Negative for weakness, numbness and headaches  Hematological: Does not bruise/bleed easily  Psychiatric/Behavioral: Negative for hallucinations and suicidal ideas  All other systems reviewed and are negative  Physical Exam  Physical Exam  Vitals reviewed  Constitutional:       General: She is not in acute distress  Appearance: She is obese  HENT:      Head: Normocephalic and atraumatic  Nose: Nose normal       Mouth/Throat:      Mouth: Mucous membranes are moist    Eyes:      General:         Right eye: No discharge  Left eye: No discharge  Conjunctiva/sclera: Conjunctivae normal    Cardiovascular:      Rate and Rhythm: Normal rate and regular rhythm  Pulses: Normal pulses  Pulmonary:      Effort: Pulmonary effort is normal  No respiratory distress  Musculoskeletal:         General: Tenderness present  No deformity or signs of injury  Cervical back: Normal range of motion and neck supple  Comments: There is tenderness at the right trapezius  There is discomfort with range of motion of the right shoulder  Patient is unable to fully abduct to the right shoulder  Neurovascular exam is normal    Skin:     General: Skin is warm and dry  Findings: No rash  Neurological:      General: No focal deficit present  Mental Status: She is alert and oriented to person, place, and time     Psychiatric:         Mood and Affect: Mood normal          Behavior: Behavior normal          Vital Signs  ED Triage Vitals [07/12/22 0853]   Temperature Pulse Respirations Blood Pressure SpO2   97 8 °F (36 6 °C) 85 15 135/76 100 %      Temp Source Heart Rate Source Patient Position - Orthostatic VS BP Location FiO2 (%)   Oral Monitor Sitting Left arm --      Pain Score       7           Vitals:    07/12/22 0853   BP: 135/76   Pulse: 85   Patient Position - Orthostatic VS: Sitting         Visual Acuity      ED Medications  Medications - No data to display    Diagnostic Studies  Results Reviewed     None                 XR shoulder 2+ views RIGHT   ED Interpretation by Isidra Singh MD (07/12 1021)   No fracture or dislocation                 Procedures  Procedures         ED Course                               SBIRT 20yo+    Flowsheet Row Most Recent Value   SBIRT (23 yo +)    In order to provide better care to our patients, we are screening all of our patients for alcohol and drug use  Would it be okay to ask you these screening questions? Yes Filed at: 07/12/2022 9370   Initial Alcohol Screen: US AUDIT-C     1  How often do you have a drink containing alcohol? 0 Filed at: 07/12/2022 0903   2  How many drinks containing alcohol do you have on a typical day you are drinking? 0 Filed at: 07/12/2022 0903   3a  Male UNDER 65: How often do you have five or more drinks on one occasion? 0 Filed at: 07/12/2022 0903   3b  FEMALE Any Age, or MALE 65+: How often do you have 4 or more drinks on one occassion? 0 Filed at: 07/12/2022 0903   Audit-C Score 0 Filed at: 07/12/2022 8969   MENDEZ: How many times in the past year have you    Used an illegal drug or used a prescription medication for non-medical reasons? Never Filed at: 07/12/2022 6390                    MDM  Number of Diagnoses or Management Options  Diagnosis management comments: Imaging negative for fracture or dislocation  This could be a sprain  Could be a rotator cuff injury  Will treat with NSAIDs and refer to Orthopedics  Appropriate for discharge and outpatient management         Amount and/or Complexity of Data Reviewed  Tests in the radiology section of CPT®: ordered and reviewed        Disposition  Final diagnoses:   Sprain of right shoulder     Time reflects when diagnosis was documented in both MDM as applicable and the Disposition within this note     Time User Action Codes Description Comment    7/12/2022 10:24 AM Mariluz Brand Add [S47 [id number]] Sprain of right shoulder       ED Disposition     ED Disposition   Discharge    Condition   Stable    Date/Time   Tue Jul 12, 2022 10:24 AM    Comment   Miguel Angel Sarabia discharge to home/self care  Follow-up Information     Follow up With Specialties Details Why Contact Info Additional Harika Maykamila [facility] Orthopedic Surgery In 1 week As needed [address]  [place] [address] [facility] [address]  [address] [place] [address], [address], [address] [facility], [facility], [place], [address]   [phone]          Patient's Medications   Discharge Prescriptions    No medications on file       No discharge procedures on file      PDMP Review     None          ED Provider  Electronically Signed by           Isidra Singh MD  07/12/22 2000

## 2022-07-13 LAB — BACTERIA UR CULT: NORMAL

## 2022-07-22 ENCOUNTER — APPOINTMENT (OUTPATIENT)
Dept: LAB | Facility: HOSPITAL | Age: 22
End: 2022-07-22
Payer: MEDICARE

## 2022-07-22 DIAGNOSIS — K51.919 MILD CHRONIC ULCERATIVE COLITIS WITH COMPLICATION (HCC): ICD-10-CM

## 2022-07-22 LAB — HBV SURFACE AG SER QL: NORMAL

## 2022-07-22 PROCEDURE — 36415 COLL VENOUS BLD VENIPUNCTURE: CPT

## 2022-07-22 PROCEDURE — 86480 TB TEST CELL IMMUN MEASURE: CPT

## 2022-07-22 PROCEDURE — 87340 HEPATITIS B SURFACE AG IA: CPT

## 2022-07-25 LAB
GAMMA INTERFERON BACKGROUND BLD IA-ACNC: 0.03 IU/ML
M TB IFN-G BLD-IMP: NEGATIVE
M TB IFN-G CD4+ BCKGRND COR BLD-ACNC: 0 IU/ML
M TB IFN-G CD4+ BCKGRND COR BLD-ACNC: 0 IU/ML
MITOGEN IGNF BCKGRD COR BLD-ACNC: >10 IU/ML

## 2022-07-27 DIAGNOSIS — R73.01 IFG (IMPAIRED FASTING GLUCOSE): ICD-10-CM

## 2022-07-28 RX ORDER — METFORMIN HYDROCHLORIDE 500 MG/1
TABLET, EXTENDED RELEASE ORAL
Qty: 30 TABLET | Refills: 2 | Status: SHIPPED | OUTPATIENT
Start: 2022-07-28 | End: 2022-10-27

## 2022-08-05 ENCOUNTER — OFFICE VISIT (OUTPATIENT)
Dept: FAMILY MEDICINE CLINIC | Facility: CLINIC | Age: 22
End: 2022-08-05
Payer: MEDICARE

## 2022-08-05 VITALS
HEIGHT: 65 IN | OXYGEN SATURATION: 98 % | HEART RATE: 75 BPM | BODY MASS INDEX: 34.99 KG/M2 | WEIGHT: 210 LBS | DIASTOLIC BLOOD PRESSURE: 80 MMHG | TEMPERATURE: 97.8 F | SYSTOLIC BLOOD PRESSURE: 120 MMHG

## 2022-08-05 DIAGNOSIS — L70.0 ACNE VULGARIS: Primary | ICD-10-CM

## 2022-08-05 DIAGNOSIS — E66.09 CLASS 1 OBESITY DUE TO EXCESS CALORIES WITHOUT SERIOUS COMORBIDITY WITH BODY MASS INDEX (BMI) OF 34.0 TO 34.9 IN ADULT: ICD-10-CM

## 2022-08-05 PROCEDURE — 99214 OFFICE O/P EST MOD 30 MIN: CPT | Performed by: FAMILY MEDICINE

## 2022-08-05 NOTE — ASSESSMENT & PLAN NOTE
Chronic patient tolerated clindamycin and Differin cream without any side effect notice improvement happy with the result encouraged patient to continue

## 2022-08-05 NOTE — PROGRESS NOTES
BMI Counseling: Body mass index is 34 95 kg/m²  The BMI is above normal  Nutrition recommendations include decreasing portion sizes, decreasing fast food intake and limiting drinks that contain sugar  Exercise recommendations include exercising 3-5 times per week  No pharmacotherapy was ordered  Rationale for BMI follow-up plan is due to patient being overweight or obese  Depression Screening and Follow-up Plan: Patient was screened for depression during today's encounter  They screened negative with a PHQ-2 score of 0  Subjective:   Chief Complaint   Patient presents with    Acne    Other     Dont feel full when she eats  Cant loose weight        Patient ID: Jose L Kerns is a 19 y o  female  Patient here concerned about her weight she is frustrated with her weight she been trying different kind diet and she is watching what she eat and has not been losing any weight and she is gaining weight patient had positive family history of diabetic and we did start her on metformin extended we she been taking it for while and feel is not helping also patient history of acne compliant with the medication tolerated well      The following portions of the patient's history were reviewed and updated as appropriate: allergies, current medications, past family history, past medical history, past social history, past surgical history and problem list     Review of Systems   Constitutional: Negative for activity change, appetite change, fatigue and fever  HENT: Negative for congestion, ear pain, sinus pressure, sinus pain and sore throat  Eyes: Negative for pain, discharge, redness and itching  Respiratory: Negative for cough, chest tightness, shortness of breath and stridor  Cardiovascular: Negative for chest pain, palpitations and leg swelling  Gastrointestinal: Negative for abdominal pain, blood in stool, constipation, diarrhea and nausea     Genitourinary: Negative for dysuria, flank pain, frequency and hematuria  Musculoskeletal: Negative for back pain, joint swelling and neck pain  Neurological: Negative for dizziness, tremors, weakness, numbness and headaches  Hematological: Does not bruise/bleed easily  Objective:  Vitals:    08/05/22 0920   BP: 120/80   Pulse: 75   Temp: 97 8 °F (36 6 °C)   TempSrc: Tympanic   SpO2: 98%   Weight: 95 3 kg (210 lb)   Height: 5' 5" (1 651 m)      Physical Exam  Vitals and nursing note reviewed  Constitutional:       General: She is not in acute distress  Appearance: She is well-developed  HENT:      Head: Normocephalic and atraumatic  Nose: No congestion  Mouth/Throat:      Pharynx: No oropharyngeal exudate  Eyes:      General:         Right eye: No discharge  Left eye: No discharge  Conjunctiva/sclera: Conjunctivae normal    Neck:      Vascular: No JVD  Cardiovascular:      Rate and Rhythm: Normal rate and regular rhythm  Heart sounds: Normal heart sounds  No murmur heard  No friction rub  No gallop  Pulmonary:      Effort: Pulmonary effort is normal       Breath sounds: Normal breath sounds  Abdominal:      General: Bowel sounds are normal       Palpations: Abdomen is soft  Tenderness: There is no abdominal tenderness  Musculoskeletal:      Right lower leg: No edema  Left lower leg: No edema  Neurological:      Mental Status: She is oriented to person, place, and time             Assessment/Plan:    Class 1 obesity due to excess calories without serious comorbidity in adult  Chronic uncontrolled BMI 34 95 the patient frustrated with her weight she been trying to a do diet and she been taking the metformin extended release for while and nothing helped  Continue portion control low carb low-fat diet recommend the to be evaluated by weight management will refer the patient today    Acne vulgaris  Chronic patient tolerated clindamycin and Differin cream without any side effect notice improvement happy with the result encouraged patient to continue         Diagnoses and all orders for this visit:    Acne vulgaris    Class 1 obesity due to excess calories without serious comorbidity with body mass index (BMI) of 34 0 to 34 9 in adult  -     Ambulatory Referral to Weight Management;  Future

## 2022-08-05 NOTE — ASSESSMENT & PLAN NOTE
Chronic uncontrolled BMI 34 95 the patient frustrated with her weight she been trying to a do diet and she been taking the metformin extended release for while and nothing helped  Continue portion control low carb low-fat diet recommend the to be evaluated by weight management will refer the patient today

## 2022-08-09 ENCOUNTER — OFFICE VISIT (OUTPATIENT)
Dept: BARIATRICS | Facility: CLINIC | Age: 22
End: 2022-08-09
Payer: MEDICARE

## 2022-08-09 VITALS
RESPIRATION RATE: 16 BRPM | DIASTOLIC BLOOD PRESSURE: 70 MMHG | BODY MASS INDEX: 35.04 KG/M2 | HEART RATE: 88 BPM | SYSTOLIC BLOOD PRESSURE: 110 MMHG | HEIGHT: 65 IN | WEIGHT: 210.3 LBS

## 2022-08-09 DIAGNOSIS — K50.10 CROHN'S DISEASE OF LARGE INTESTINE WITHOUT COMPLICATION (HCC): ICD-10-CM

## 2022-08-09 DIAGNOSIS — E66.09 CLASS 1 OBESITY DUE TO EXCESS CALORIES WITHOUT SERIOUS COMORBIDITY WITH BODY MASS INDEX (BMI) OF 34.0 TO 34.9 IN ADULT: ICD-10-CM

## 2022-08-09 DIAGNOSIS — E66.9 OBESITY, CLASS II, BMI 35-39.9: Primary | ICD-10-CM

## 2022-08-09 PROBLEM — E66.812 OBESITY, CLASS II, BMI 35-39.9: Status: ACTIVE | Noted: 2022-08-09

## 2022-08-09 PROCEDURE — 99213 OFFICE O/P EST LOW 20 MIN: CPT | Performed by: NURSE PRACTITIONER

## 2022-08-09 NOTE — PROGRESS NOTES
Assessment/Plan:    Obesity, Class II, BMI 35-39 9  - Discussed options of HealthyCORE-Intensive Lifestyle Intervention Program, Very Low Calorie Diet-VLCD and Conservative Program and the role of weight loss medications  - Explained the importance of making lifestyle changes first before starting any anti-obesity medications  Patient should demonstrate lifestyle changes first before anti-obesity medication can be initiated  - Patient is interested in pursuing Conservative Program  - Initial weight loss goal of 5-10% weight loss for improved health  - Weight loss can improve patient's co-morbid conditions and/or prevent weight-related complications  - Avoid Topamax and Phentermine, as she had worsening of urinary urgency on Topamax and Phentermine in the past    - Already on Metformin  mg daily - tolerating well  Can consider increasing further if needed for weight loss in the future  - Stop Bang 0/8  - Labs reviewed: lipid panel 3/22/2022 was within normal limits  CMP 3/13/2022, glucose elevated and ALT slightly elevated, which may improve with weight loss and lifestyle modifications  Otherwise, CMP within acceptable range  TSH 2/2/2022 was within normal limits  - Check A1C and fasting insulin  Goals:  Do not skip meals  Food log (ie ) www myfitnesspal com,sparkpeople  com,loseit com,calorieking  com,etc  baritastic (use skinnytaste  com, dietdoctor  com or smartphone lauren [employer] for recipes)  No sugary beverages  Keep up the great work with hydration at least 64oz of water daily  Increase physical activity by 10 minutes daily  Gradually increase physical activity to a goal of 5 days per week for 30 minutes of MODERATE intensity PLUS 2 days per week of FULL BODY resistance training (use smartphone apps [employer], Home Workout, etc )  - Start food logging, weighing and measuring food and honey  - around 1200 calories per day  Sample menu given  - Keep up the great work with exercise  Crohn's disease of large intestine without complication ([place] [address] )  - Taking Stelara  Continue to follow with GI                Marcial Wilde was seen today for consult  Diagnoses and all orders for this visit:    Obesity, Class II, BMI 35-39 9  -     HEMOGLOBIN A1C W/ EAG ESTIMATION; Future  -     Insulin, fasting; Future    Class 1 obesity due to excess calories without serious comorbidity with body mass index (BMI) of 34 0 to 34 9 in adult  -     Ambulatory Referral to Weight Management    Crohn's disease of large intestine without complication ([place] [address] )          Follow up in approximately 2 months with Non-Surgical Physician/Advanced Practitioner  Subjective:   Chief Complaint   Patient presents with    Consult     MWM consult; waist 35in; goal wt 170; stop bang 0-8       Patient ID: Audelia Alves  is a 19 y o  female with excess weight/obesity here to pursue weight management  Previous notes and records have been reviewed      Past Medical History:   Diagnosis Date    Crohn's disease ([place] [address] )     Fever 12/8/2021    Hidradenitis suppurativa     thighs    Obesity, Class II, BMI 35-39 9     Sore throat 6/1/2022    TMJ (temporomandibular joint disorder)     last assessed 02/24/2016    Urinary tract infection     Varicella     vaccine     Past Surgical History:   Procedure Laterality Date    COLONOSCOPY      x several; last one 2/2020    WISDOM TOOTH EXTRACTION         HPI:  Wt Readings from Last 20 Encounters:   08/09/22 95 4 kg (210 lb 4 8 oz)   08/05/22 95 3 kg (210 lb)   07/12/22 94 3 kg (208 lb)   07/11/22 93 9 kg (207 lb)   06/01/22 94 3 kg (207 lb 12 8 oz)   05/05/22 93 9 kg (207 lb)   05/03/22 91 6 kg (202 lb)   03/13/22 93 4 kg (205 lb 12 8 oz)   03/10/22 93 4 kg (206 lb)   02/04/22 98 kg (216 lb)   01/04/22 103 kg (226 lb)   01/01/22 104 kg (230 lb 4 8 oz)   12/29/21 106 kg (233 lb)   12/13/21 106 kg (233 lb)   12/08/21 106 kg (233 lb)   11/24/21 109 kg (241 lb)   07/28/21 112 kg (246 lb)   07/15/21 112 kg (246 lb 3 2 oz)   21 105 kg (231 lb 11 3 oz)   21 106 kg (234 lb)     Obesity/Excess Weight:  Severity: Moderate  Onset:  Whole life, but worse past 1-1 5 years    Modifiers: Diet and Exercise, Physician Supervised Weight Loss Program, Prescription Weight Loss Medications, Over the Counter Weight Loss Supplements and saw Dr Anna Ivy and was on Topamax and phentermine - had increased urination  Contributing factors: Medications and steroids in the past for crohn's   Associated symptoms: decreased self esteem     Topamax and phentermine prescribed through Dr Anna Ivy in the past - worsened urinary urgency  Hydration: 4-18 oz water, 4 oz decaf hot tea with natural honey 1-2 times per week, oatmilk twice a week with protein cereal, zero calorie seltzer 1 can every few weeks   Alcohol: 1 drink every other month if that  Smoking: denies  Exercise: gym 4 times per week - 45 minutes treadmill or elliptical and 30 minutes of weight training   Occupation:  for alzheimer's patients    Sleep: 6 hours  STOP ban/8    Highest weight: 250 lbs 2021  Current weight: 210 3 lbs  Goal weight: 170-180 lbs    Colonoscopy: UTD, follows with GI for Crohn's   Mammogram: N/A    The following portions of the patient's history were reviewed and updated as appropriate: allergies, current medications, past family history, past medical history, past social history, past surgical history, and problem list     Review of Systems   HENT: Negative for sore throat  Respiratory: Negative for cough and shortness of breath  Cardiovascular: Negative for chest pain and palpitations  Gastrointestinal: Negative for constipation, diarrhea, nausea and vomiting  Denies GERD   Endocrine: Negative for cold intolerance and heat intolerance  Genitourinary: Negative for dysuria  Musculoskeletal: Negative for arthralgias and back pain  Skin: Negative for rash  Neurological: Negative for headaches  Psychiatric/Behavioral: Negative for suicidal ideas (or HI)  Denies depression and anxiety       Objective:  /70   Pulse 88   Resp 16   Ht 5' 4 75" (1 645 m)   Wt 95 4 kg (210 lb 4 8 oz)   LMP 07/30/2022 (Exact Date)   Breastfeeding No   BMI 35 27 kg/m²     Physical Exam  Vitals and nursing note reviewed  Constitutional   General appearance: Abnormal   well developed and obese  Eyes No conjunctival injection  Ears, Nose, Mouth, and Throat Oral mucosa moist    Pulmonary   Respiratory effort: No increased work of breathing or signs of respiratory distress  Cardiovascular     Examination of extremities for edema and/or varicosities: Normal   no edema  Abdomen   Abdomen: Abnormal   The abdomen was obese      Musculoskeletal   Gait and station: Normal     Psychiatric   Orientation to person, place and time: Normal     Affect: appropriate

## 2022-08-09 NOTE — ASSESSMENT & PLAN NOTE
- Discussed options of HealthyCORE-Intensive Lifestyle Intervention Program, Very Low Calorie Diet-VLCD and Conservative Program and the role of weight loss medications  - Explained the importance of making lifestyle changes first before starting any anti-obesity medications  Patient should demonstrate lifestyle changes first before anti-obesity medication can be initiated  - Patient is interested in pursuing Conservative Program  - Initial weight loss goal of 5-10% weight loss for improved health  - Weight loss can improve patient's co-morbid conditions and/or prevent weight-related complications  - Avoid Topamax and Phentermine, as she had worsening of urinary urgency on Topamax and Phentermine in the past    - Already on Metformin  mg daily - tolerating well  Can consider increasing further if needed for weight loss in the future  - Stop Bang 0/8  - Labs reviewed: lipid panel 3/22/2022 was within normal limits  CMP 3/13/2022, glucose elevated and ALT slightly elevated, which may improve with weight loss and lifestyle modifications  Otherwise, CMP within acceptable range  TSH 2/2/2022 was within normal limits  - Check A1C and fasting insulin  Goals:  Do not skip meals  Food log (ie ) www myfitnesspal com,sparkpeople  com,loseit com,calorieking  com,etc  baritastic (use skinnytaste  com, dietdoctor  com or smartphone lauren [employer] for recipes)  No sugary beverages  Keep up the great work with hydration at least 64oz of water daily  Increase physical activity by 10 minutes daily  Gradually increase physical activity to a goal of 5 days per week for 30 minutes of MODERATE intensity PLUS 2 days per week of FULL BODY resistance training (use smartphone apps [employer], Home Workout, etc )  - Start food logging, weighing and measuring food and honey  - around 1200 calories per day  Sample menu given  - Keep up the great work with exercise

## 2022-09-02 ENCOUNTER — ANNUAL EXAM (OUTPATIENT)
Dept: FAMILY MEDICINE CLINIC | Facility: CLINIC | Age: 22
End: 2022-09-02
Payer: MEDICARE

## 2022-09-02 VITALS
HEART RATE: 74 BPM | WEIGHT: 216.2 LBS | BODY MASS INDEX: 36.02 KG/M2 | HEIGHT: 65 IN | OXYGEN SATURATION: 98 % | TEMPERATURE: 97.1 F | DIASTOLIC BLOOD PRESSURE: 72 MMHG | SYSTOLIC BLOOD PRESSURE: 120 MMHG

## 2022-09-02 DIAGNOSIS — N76.0 BACTERIAL VAGINOSIS: Primary | ICD-10-CM

## 2022-09-02 DIAGNOSIS — R30.0 DYSURIA: ICD-10-CM

## 2022-09-02 DIAGNOSIS — L73.2 HIDRADENITIS SUPPURATIVA: ICD-10-CM

## 2022-09-02 DIAGNOSIS — B96.89 BACTERIAL VAGINOSIS: Primary | ICD-10-CM

## 2022-09-02 PROBLEM — R09.81 NASAL CONGESTION: Status: RESOLVED | Noted: 2022-06-01 | Resolved: 2022-09-02

## 2022-09-02 LAB
BACTERIA UR QL AUTO: NORMAL /HPF
BILIRUB UR QL STRIP: NEGATIVE
BV WHIFF TEST VAG QL: ABNORMAL
CLARITY UR: CLEAR
CLUE CELLS SPEC QL WET PREP: POSITIVE
COLOR UR: NORMAL
GLUCOSE UR STRIP-MCNC: NEGATIVE MG/DL
HGB UR QL STRIP.AUTO: NEGATIVE
KETONES UR STRIP-MCNC: NEGATIVE MG/DL
LEUKOCYTE ESTERASE UR QL STRIP: NEGATIVE
NITRITE UR QL STRIP: NEGATIVE
NON-SQ EPI CELLS URNS QL MICRO: NORMAL /HPF
PH SMN: ABNORMAL [PH]
PH UR STRIP.AUTO: 6.5 [PH]
PROT UR STRIP-MCNC: NEGATIVE MG/DL
RBC #/AREA URNS AUTO: NORMAL /HPF
SL AMB POCT WET MOUNT: ABNORMAL
SP GR UR STRIP.AUTO: 1.02 (ref 1–1.03)
T VAGINALIS VAG QL WET PREP: ABNORMAL
UROBILINOGEN UR STRIP-ACNC: <2 MG/DL
WBC #/AREA URNS AUTO: NORMAL /HPF
YEAST VAG QL WET PREP: ABNORMAL

## 2022-09-02 PROCEDURE — 81001 URINALYSIS AUTO W/SCOPE: CPT | Performed by: FAMILY MEDICINE

## 2022-09-02 PROCEDURE — 87086 URINE CULTURE/COLONY COUNT: CPT | Performed by: FAMILY MEDICINE

## 2022-09-02 PROCEDURE — 87210 SMEAR WET MOUNT SALINE/INK: CPT | Performed by: FAMILY MEDICINE

## 2022-09-02 PROCEDURE — 99214 OFFICE O/P EST MOD 30 MIN: CPT | Performed by: FAMILY MEDICINE

## 2022-09-02 RX ORDER — METRONIDAZOLE 500 MG/1
500 TABLET ORAL EVERY 12 HOURS SCHEDULED
Qty: 14 TABLET | Refills: 0 | Status: SHIPPED | OUTPATIENT
Start: 2022-09-02 | End: 2022-09-09

## 2022-09-04 LAB — BACTERIA UR CULT: NORMAL

## 2022-09-05 NOTE — PROGRESS NOTES
Subjective:   Chief Complaint   Patient presents with    Painful Urination     Vaginal area slight itchy   Patient ID: Daniella Harper is a 19 y o  female  Patient here concerned about the vaginal discharge no abdomen pain no flank pain she had burning sensation urination no blood in the urine no fever no weight change patient not sexually active patient had history of bacteria vaginosis she just finished her menstruation a she does not use any and scented pad also patient concerned about skin lesion and bilateral upper thigh and it is red and inflamed      The following portions of the patient's history were reviewed and updated as appropriate: allergies, current medications, past family history, past medical history, past social history, past surgical history and problem list     Review of Systems   Constitutional: Negative for chills and fever  HENT: Negative for ear pain and sore throat  Eyes: Negative for pain and visual disturbance  Respiratory: Negative for cough and shortness of breath  Cardiovascular: Negative for chest pain and palpitations  Gastrointestinal: Negative for abdominal pain and vomiting  Genitourinary: Positive for dysuria and vaginal discharge  Negative for hematuria  Musculoskeletal: Negative for arthralgias and back pain  Skin: Negative for color change  red bumps in bilateral upper thigh   Neurological: Negative for seizures and syncope  All other systems reviewed and are negative  Objective:  Vitals:    09/02/22 0919   BP: 120/72   BP Location: Right arm   Patient Position: Standing   Cuff Size: Large   Pulse: 74   Temp: (!) 97 1 °F (36 2 °C)   TempSrc: Tympanic   SpO2: 98%   Weight: 98 1 kg (216 lb 3 2 oz)   Height: 5' 4 75" (1 645 m)      Physical Exam  Vitals and nursing note reviewed  Constitutional:       General: She is not in acute distress  Appearance: She is well-developed  HENT:      Head: Normocephalic and atraumatic  Nose: Nose normal       Mouth/Throat:      Pharynx: Oropharynx is clear  Eyes:      Conjunctiva/sclera: Conjunctivae normal    Neck:      Vascular: No JVD  Cardiovascular:      Rate and Rhythm: Normal rate and regular rhythm  Heart sounds: Normal heart sounds  No murmur heard  No friction rub  No gallop  Pulmonary:      Effort: Pulmonary effort is normal       Breath sounds: Normal breath sounds  Abdominal:      General: Bowel sounds are normal       Palpations: Abdomen is soft  Tenderness: There is no abdominal tenderness  Genitourinary:     Vagina: Vaginal discharge present  Skin:     Findings: Erythema present  Comments: A erythematous macular in bilateral upper thigh with the old scars   Neurological:      Mental Status: She is oriented to person, place, and time  Gait: Gait normal            Assessment/Plan:    Bacterial vaginosis  Acute symptomatic recurrent wet mount is positive for clue cells recommend the to use Flagyl 500 twice a day for 7 days proper use and possible side effect discussed the patient recommend patient follow OBGYN for recurrent bacterial vaginitis    Hidradenitis suppurativa  Chronic symptomatic not responding to the current management the a new bilateral upper thigh and recommend to be evaluated by Dermatology    Dysuria  Symptomatic burning sensation urination no abdomen pain no flank pain with send the urine for UA and CS if positive will call antibiotic       Diagnoses and all orders for this visit:    Bacterial vaginosis  -     metroNIDAZOLE (Flagyl) 500 mg tablet; Take 1 tablet (500 mg total) by mouth every 12 (twelve) hours for 7 days  -     POCT wet mount    Dysuria  -     Urinalysis with microscopic  -     Urine culture    Hidradenitis suppurativa  -     Ambulatory Referral to Dermatology;  Future

## 2022-09-05 NOTE — ASSESSMENT & PLAN NOTE
Chronic symptomatic not responding to the current management the a new bilateral upper thigh and recommend to be evaluated by Dermatology

## 2022-09-05 NOTE — ASSESSMENT & PLAN NOTE
Acute symptomatic recurrent wet mount is positive for clue cells recommend the to use Flagyl 500 twice a day for 7 days proper use and possible side effect discussed the patient recommend patient follow OBGYN for recurrent bacterial vaginitis

## 2022-09-05 NOTE — ASSESSMENT & PLAN NOTE
Symptomatic burning sensation urination no abdomen pain no flank pain with send the urine for UA and CS if positive will call antibiotic

## 2022-10-19 ENCOUNTER — HOSPITAL ENCOUNTER (EMERGENCY)
Facility: HOSPITAL | Age: 22
Discharge: HOME | End: 2022-10-19
Attending: EMERGENCY MEDICINE
Payer: MEDICARE

## 2022-10-19 ENCOUNTER — APPOINTMENT (EMERGENCY)
Dept: CT IMAGING | Facility: HOSPITAL | Age: 22
End: 2022-10-19
Payer: MEDICARE

## 2022-10-19 VITALS
DIASTOLIC BLOOD PRESSURE: 69 MMHG | BODY MASS INDEX: 35.71 KG/M2 | WEIGHT: 212.96 LBS | SYSTOLIC BLOOD PRESSURE: 121 MMHG | TEMPERATURE: 98.1 F | RESPIRATION RATE: 16 BRPM | HEART RATE: 79 BPM | OXYGEN SATURATION: 100 %

## 2022-10-19 DIAGNOSIS — R07.89 ATYPICAL CHEST PAIN: Primary | ICD-10-CM

## 2022-10-19 DIAGNOSIS — R07.9 CHEST PAIN, UNSPECIFIED TYPE: ICD-10-CM

## 2022-10-19 LAB
ALBUMIN SERPL BCP-MCNC: 4.3 G/DL (ref 3.5–5)
ALP SERPL-CCNC: 50 U/L (ref 43–122)
ALT SERPL W P-5'-P-CCNC: 44 U/L
ANION GAP SERPL CALCULATED.3IONS-SCNC: 9 MMOL/L (ref 5–14)
AST SERPL W P-5'-P-CCNC: 28 U/L (ref 14–36)
ATRIAL RATE: 75 BPM
BASOPHILS # BLD AUTO: 0.08 THOUSANDS/ÂΜL (ref 0–0.1)
BASOPHILS NFR BLD AUTO: 1 % (ref 0–1)
BILIRUB SERPL-MCNC: 0.91 MG/DL (ref 0.2–1)
BUN SERPL-MCNC: 14 MG/DL (ref 5–25)
CALCIUM SERPL-MCNC: 8.8 MG/DL (ref 8.4–10.2)
CARDIAC TROPONIN I PNL SERPL HS: 2 NG/L
CHLORIDE SERPL-SCNC: 105 MMOL/L (ref 96–108)
CO2 SERPL-SCNC: 24 MMOL/L (ref 21–32)
CREAT SERPL-MCNC: 0.81 MG/DL (ref 0.6–1.2)
D DIMER PPP FEU-MCNC: 0.67 UG/ML FEU
EOSINOPHIL # BLD AUTO: 0.34 THOUSAND/ÂΜL (ref 0–0.61)
EOSINOPHIL NFR BLD AUTO: 4 % (ref 0–6)
ERYTHROCYTE [DISTWIDTH] IN BLOOD BY AUTOMATED COUNT: 12.2 % (ref 11.6–15.1)
EXT PREG TEST URINE: NEGATIVE
EXT. CONTROL ED NAV: NORMAL
GFR SERPL CREATININE-BSD FRML MDRD: 102 ML/MIN/1.73SQ M
GLUCOSE SERPL-MCNC: 102 MG/DL (ref 70–99)
HCT VFR BLD AUTO: 42.3 % (ref 34.8–46.1)
HGB BLD-MCNC: 13.7 G/DL (ref 11.5–15.4)
IMM GRANULOCYTES # BLD AUTO: 0.01 THOUSAND/UL (ref 0–0.2)
IMM GRANULOCYTES NFR BLD AUTO: 0 % (ref 0–2)
LYMPHOCYTES # BLD AUTO: 1.53 THOUSANDS/ÂΜL (ref 0.6–4.47)
LYMPHOCYTES NFR BLD AUTO: 19 % (ref 14–44)
MCH RBC QN AUTO: 29.5 PG (ref 26.8–34.3)
MCHC RBC AUTO-ENTMCNC: 32.4 G/DL (ref 31.4–37.4)
MCV RBC AUTO: 91 FL (ref 82–98)
MONOCYTES # BLD AUTO: 0.59 THOUSAND/ÂΜL (ref 0.17–1.22)
MONOCYTES NFR BLD AUTO: 7 % (ref 4–12)
NEUTROPHILS # BLD AUTO: 5.61 THOUSANDS/ÂΜL (ref 1.85–7.62)
NEUTS SEG NFR BLD AUTO: 69 % (ref 43–75)
NRBC BLD AUTO-RTO: 0 /100 WBCS
P AXIS: -10 DEGREES
PLATELET # BLD AUTO: 269 THOUSANDS/UL (ref 149–390)
PMV BLD AUTO: 9.5 FL (ref 8.9–12.7)
POTASSIUM SERPL-SCNC: 3.9 MMOL/L (ref 3.5–5.3)
PR INTERVAL: 112 MS
PROT SERPL-MCNC: 7.7 G/DL (ref 6.4–8.4)
QRS AXIS: 65 DEGREES
QRSD INTERVAL: 82 MS
QT INTERVAL: 374 MS
QTC INTERVAL: 417 MS
RBC # BLD AUTO: 4.64 MILLION/UL (ref 3.81–5.12)
SODIUM SERPL-SCNC: 138 MMOL/L (ref 135–147)
T WAVE AXIS: 50 DEGREES
VENTRICULAR RATE: 75 BPM
WBC # BLD AUTO: 8.16 THOUSAND/UL (ref 4.31–10.16)

## 2022-10-19 PROCEDURE — 96360 HYDRATION IV INFUSION INIT: CPT

## 2022-10-19 PROCEDURE — 81025 URINE PREGNANCY TEST: CPT | Performed by: EMERGENCY MEDICINE

## 2022-10-19 PROCEDURE — G1004 CDSM NDSC: HCPCS

## 2022-10-19 PROCEDURE — 84484 ASSAY OF TROPONIN QUANT: CPT | Performed by: EMERGENCY MEDICINE

## 2022-10-19 PROCEDURE — 99285 EMERGENCY DEPT VISIT HI MDM: CPT

## 2022-10-19 PROCEDURE — 80053 COMPREHEN METABOLIC PANEL: CPT | Performed by: EMERGENCY MEDICINE

## 2022-10-19 PROCEDURE — 93005 ELECTROCARDIOGRAM TRACING: CPT

## 2022-10-19 PROCEDURE — 85025 COMPLETE CBC W/AUTO DIFF WBC: CPT | Performed by: EMERGENCY MEDICINE

## 2022-10-19 PROCEDURE — 99284 EMERGENCY DEPT VISIT MOD MDM: CPT | Performed by: EMERGENCY MEDICINE

## 2022-10-19 PROCEDURE — 71275 CT ANGIOGRAPHY CHEST: CPT

## 2022-10-19 PROCEDURE — 93010 ELECTROCARDIOGRAM REPORT: CPT | Performed by: INTERNAL MEDICINE

## 2022-10-19 PROCEDURE — 36415 COLL VENOUS BLD VENIPUNCTURE: CPT | Performed by: EMERGENCY MEDICINE

## 2022-10-19 PROCEDURE — 85379 FIBRIN DEGRADATION QUANT: CPT | Performed by: EMERGENCY MEDICINE

## 2022-10-19 RX ORDER — NAPROXEN 375 MG/1
375 TABLET ORAL 2 TIMES DAILY WITH MEALS
Qty: 20 TABLET | Refills: 0 | Status: SHIPPED | OUTPATIENT
Start: 2022-10-19

## 2022-10-19 RX ADMIN — SODIUM CHLORIDE 1000 ML: 0.9 INJECTION, SOLUTION INTRAVENOUS at 09:58

## 2022-10-19 RX ADMIN — IOHEXOL 85 ML: 350 INJECTION, SOLUTION INTRAVENOUS at 09:47

## 2022-10-19 NOTE — ED PROVIDER NOTES
History  Chief Complaint   Patient presents with   • Chest Pain     4 days, feels SOB and tired  No cough       66-year-old female presenting emergency department with upper chest pain with inspiration it has been going on for 4 days  No cough congestion rhinorrhea  No fevers chills  No nausea vomiting diarrhea  Associated fatigue  Has history of Crohn's disease, on monoclonal antibody  Nonradiating pain  History provided by:  Patient  Chest Pain  Pain location:  Substernal area  Pain quality: aching    Pain radiates to:  Does not radiate  Pain radiates to the back: no    Pain severity:  Mild  Onset quality:  Gradual  Duration:  4 days  Timing:  Constant  Progression:  Unchanged  Chronicity:  New  Context: breathing    Associated symptoms: no abdominal pain, no back pain, no cough, no fever, no nausea, no palpitations, no shortness of breath and not vomiting        Prior to Admission Medications   Prescriptions Last Dose Informant Patient Reported? Taking?    Multiple Vitamins-Minerals (HAIR SKIN AND NAILS FORMULA PO) Not Taking at Unknown time Self Yes No   Sig: Take by mouth   Patient not taking: Reported on 10/19/2022   Stelara 45 MG/0 5ML injection  Self Yes No   clindamycin (CLINDAGEL) 1 % gel  Self No No   Sig: Apply topically 2 (two) times a day   metFORMIN (GLUCOPHAGE-XR) 500 mg 24 hr tablet  Self No No   Sig: TAKE 1 TABLET BY MOUTH EVERY DAY WITH DINNER      Facility-Administered Medications: None       Past Medical History:   Diagnosis Date   • [facility]    • Fever 12/8/2021   • Hidradenitis suppurativa     thighs   • Nasal congestion 6/1/2022   • Obesity, Class II, BMI 35-39 9    • Sore throat 6/1/2022   • TMJ (temporomandibular joint disorder)     last assessed 02/24/2016   • Urinary tract infection    • Varicella     vaccine       Past Surgical History:   Procedure Laterality Date   • COLONOSCOPY      x several; last one 2/2020   • WISDOM TOOTH EXTRACTION         Family History Problem Relation Age of Onset   • Diabetes Mother         Due to underlying condition with foot ulcer  Type 2 DM without complication  • Diabetes Father    • Polycystic ovary syndrome Sister    • Lung cancer Maternal Uncle    • Stroke Neg Hx    • Heart attack Neg Hx    • Hypertension Neg Hx    • Breast cancer Neg Hx    • Colon cancer Neg Hx    • Ovarian cancer Neg Hx    • Uterine cancer Neg Hx      I have reviewed and agree with the history as documented  E-Cigarette/Vaping   • E-Cigarette Use Never User      E-Cigarette/Vaping Substances   • Nicotine No    • THC No    • CBD No    • Flavoring No    • Other No    • Unknown No      Social History     Tobacco Use   • Smoking status: Never Smoker   • Smokeless tobacco: Never Used   • Tobacco comment: hooka smoke   Vaping Use   • Vaping Use: Never used   Substance Use Topics   • Alcohol use: No   • Drug use: No       Review of Systems   Constitutional: Negative for chills and fever  HENT: Negative for congestion, ear pain, rhinorrhea and sore throat  Eyes: Negative for pain, discharge and visual disturbance  Respiratory: Negative for cough and shortness of breath  Cardiovascular: Positive for chest pain  Negative for palpitations  Gastrointestinal: Negative for abdominal pain, diarrhea, nausea and vomiting  Endocrine: Negative for polydipsia and polyphagia  Genitourinary: Negative for dysuria, flank pain and hematuria  Musculoskeletal: Negative for arthralgias, back pain and myalgias  Skin: Negative for color change, pallor and rash  Neurological: Negative for seizures and syncope  Psychiatric/Behavioral: Negative for confusion  All other systems reviewed and are negative  Physical Exam  Physical Exam  Vitals and nursing note reviewed  Constitutional:       General: She is not in acute distress  Appearance: She is well-developed  HENT:      Head: Normocephalic and atraumatic        Nose: Nose normal       Mouth/Throat: Mouth: Mucous membranes are moist    Eyes:      Conjunctiva/sclera: Conjunctivae normal    Cardiovascular:      Rate and Rhythm: Normal rate and regular rhythm  Heart sounds: Normal heart sounds  No murmur heard  No friction rub  No gallop  Pulmonary:      Effort: Pulmonary effort is normal  No respiratory distress  Breath sounds: Normal breath sounds  Abdominal:      Palpations: Abdomen is soft  Tenderness: There is no abdominal tenderness  Musculoskeletal:      Cervical back: Neck supple  Skin:     General: Skin is warm and dry  Capillary Refill: Capillary refill takes less than 2 seconds  Neurological:      Mental Status: She is alert and oriented to person, place, and time           Vital Signs  ED Triage Vitals   Temperature Pulse Respirations Blood Pressure SpO2   10/19/22 0801 10/19/22 0801 10/19/22 0801 10/19/22 0802 10/19/22 0801   98 1 °F (36 7 °C) 85 16 124/71 100 %      Temp src Heart Rate Source Patient Position - Orthostatic VS BP Location FiO2 (%)   -- 10/19/22 0959 10/19/22 0959 10/19/22 0959 --    Monitor Sitting Left arm       Pain Score       --                  Vitals:    10/19/22 0801 10/19/22 0802 10/19/22 0959   BP:  124/71 121/69   Pulse: 85  79   Patient Position - Orthostatic VS:   Sitting         Visual Acuity      ED Medications  Medications   sodium chloride 0 9 % bolus 1,000 mL (1,000 mL Intravenous New Bag 10/19/22 0958)   iohexol (OMNIPAQUE) 350 MG/ML injection (SINGLE-DOSE) 85 mL (85 mL Intravenous Given 10/19/22 0947)       Diagnostic Studies  Results Reviewed     Procedure Component Value Units Date/Time    POCT pregnancy, urine [[id number]]  (Normal) Resulted: 10/19/22 0858    Lab Status: Final result Updated: 10/19/22 0858     EXT PREG TEST UR (Ref: Negative) negative     Control valid    D-Dimer [[id number]]  (Abnormal) Collected: 10/19/22 0812    Lab Status: Final result Specimen: Blood from Arm, Right Updated: 10/19/22 0857     D-Dimer, Quant 0 67 ug/ml FEU     HS Troponin 0hr (reflex protocol) [[id number]]  (Normal) Collected: 10/19/22 0812    Lab Status: Final result Specimen: Blood from Arm, Right Updated: 10/19/22 0840     hs TnI 0hr 2 ng/L     Comprehensive metabolic panel [[id number]]  (Abnormal) Collected: 10/19/22 0812    Lab Status: Final result Specimen: Blood from Arm, Right Updated: 10/19/22 0829     Sodium 138 mmol/L      Potassium 3 9 mmol/L      Chloride 105 mmol/L      CO2 24 mmol/L      ANION GAP 9 mmol/L      BUN 14 mg/dL      Creatinine 0 81 mg/dL      Glucose 102 mg/dL      Calcium 8 8 mg/dL      AST 28 U/L      ALT 44 U/L      Alkaline Phosphatase 50 U/L      Total Protein 7 7 g/dL      Albumin 4 3 g/dL      Total Bilirubin 0 91 mg/dL      eGFR 102 ml/min/1 73sq m     Narrative:      National Kidney Disease Foundation guidelines for Chronic Kidney Disease (CKD):   •  Stage 1 with normal or high GFR (GFR > 90 mL/min/1 73 square meters)  •  Stage 2 Mild CKD (GFR = 60-89 mL/min/1 73 square meters)  •  Stage 3A Moderate CKD (GFR = 45-59 mL/min/1 73 square meters)  •  Stage 3B Moderate CKD (GFR = 30-44 mL/min/1 73 square meters)  •  Stage 4 Severe CKD (GFR = 15-29 mL/min/1 73 square meters)  •  Stage 5 End Stage CKD (GFR <15 mL/min/1 73 square meters)  Note: GFR calculation is accurate only with a steady state creatinine    CBC and differential [[id number]] Collected: 10/19/22 0812    Lab Status: Final result Specimen: Blood from Arm, Right Updated: 10/19/22 0818     WBC 8 16 Thousand/uL      RBC 4 64 Million/uL      Hemoglobin 13 7 g/dL      Hematocrit 42 3 %      MCV 91 fL      MCH 29 5 pg      MCHC 32 4 g/dL      RDW 12 2 %      MPV 9 5 fL      Platelets 822 Thousands/uL      nRBC 0 /100 WBCs      Neutrophils Relative 69 %      Immat GRANS % 0 %      Lymphocytes Relative 19 %      Monocytes Relative 7 %      Eosinophils Relative 4 %      Basophils Relative 1 %      Neutrophils Absolute 5 61 Thousands/µL      Immature Grans Absolute 0 01 Thousand/uL      Lymphocytes Absolute 1 53 Thousands/µL      Monocytes Absolute 0 59 Thousand/µL      Eosinophils Absolute 0 34 Thousand/µL      Basophils Absolute 0 08 Thousands/µL                  CTA ED chest PE Study   Final Result by Andry Akhtar MD (10/19 1005)      No pulmonary embolus  Lungs clear  Workstation performed: [id number]                    Procedures  Procedures         ED Course             HEART Risk Score    Flowsheet Row Most Recent Value   Heart Score Risk Calculator    History 1 Filed at: 10/19/2022 0809   ECG 0 Filed at: 10/19/2022 0809   Age 0 Filed at: 10/19/2022 0809   Risk Factors 1 Filed at: 10/19/2022 0809   Troponin 0 Filed at: 10/19/2022 0809   HEART Score 2 Filed at: 10/19/2022 1984                        SBIRT 22yo+    Flowsheet Row Most Recent Value   SBIRT (23 yo +)    In order to provide better care to our patients, we are screening all of our patients for alcohol and drug use  Would it be okay to ask you these screening questions? Yes Filed at: 10/19/2022 3559   Initial Alcohol Screen: US AUDIT-C     1  How often do you have a drink containing alcohol? 0 Filed at: 10/19/2022 0804   2  How many drinks containing alcohol do you have on a typical day you are drinking? 0 Filed at: 10/19/2022 0804   3b  FEMALE Any Age, or MALE 65+: How often do you have 4 or more drinks on one occassion? 0 Filed at: 10/19/2022 0804   Audit-C Score 0 Filed at: 10/19/2022 6518   MENDEZ: How many times in the past year have you    Used an illegal drug or used a prescription medication for non-medical reasons? Never Filed at: 10/19/2022 6996                    MDM  Number of Diagnoses or Management Options  Atypical chest pain  Chest pain, unspecified type  Diagnosis management comments: EKG timed 8:01 a m  shows normal sinus rhythm with a rate of 75, QRS 82 milliseconds,  milliseconds  No ST elevations or depression  No S1 Q 3 T3      Patient with pleuritic chest pain, D-dimer positive, CT angio of the chest performed to rule out PE   CT chest negative for PE  Likely costochondritis, will treat with NSAIDs  PCP follow-up  Disposition  Final diagnoses:   Atypical chest pain   Chest pain, unspecified type     Time reflects when diagnosis was documented in both MDM as applicable and the Disposition within this note     Time User Action Codes Description Comment    10/19/2022 10:23 AM Turner Montesinos Add [R07 89] Atypical chest pain     10/19/2022 10:24 AM Turner Montesinos Add [R07 9] Chest pain, unspecified type       ED Disposition     ED Disposition   Discharge    Condition   Stable    Date/Time   Wed Oct 19, 2022 10:23 AM    Comment   Martine Lopez discharge to home/self care  Follow-up Information     Follow up With Specialties Details Why [address] [facility]   [address] [facility]  [address]             Patient's Medications   Discharge Prescriptions    NAPROXEN (NAPROSYN) 375 MG TABLET    Take 1 tablet (375 mg total) by mouth 2 (two) times a day with meals       Start Date: 10/19/2022End Date: --       Order Dose: 375 mg       Quantity: 20 tablet    Refills: 0       No discharge procedures on file      PDMP Review     None          ED Provider  Electronically Signed by           Kathlynn Merlin, MD  10/19/22 6890

## 2022-10-19 NOTE — Clinical Note
Vincent Hardin was seen and treated in our emergency department on 10/19/2022  Diagnosis:     Lina Last  may return to work on return date  She may return on this date: 10/20/2022         If you have any questions or concerns, please don't hesitate to call        Brain Best MD    ______________________________           _______________          _______________  Hospital Representative                              Date                                Time

## 2022-10-19 NOTE — Clinical Note
Debora Dickens was seen and treated in our emergency department on 10/19/2022  Diagnosis:     Isauro Ramirez  may return to work on return date  She may return on this date: 10/20/2022         If you have any questions or concerns, please don't hesitate to call        Cordell Harrell MD    ______________________________           _______________          _______________  Hospital Representative                              Date                                Time

## 2022-10-19 NOTE — Clinical Note
Romi Mejia was seen and treated in our emergency department on 10/19/2022  Diagnosis:     Jose Cruz Ignacio  may return to work on return date  She may return on this date: 10/21/2022         If you have any questions or concerns, please don't hesitate to call        Le Crespo MD    ______________________________           _______________          _______________  Hospital Representative                              Date                                Time

## 2022-10-19 NOTE — Clinical Note
Rosales Drew was seen and treated in our emergency department on 10/19/2022  Diagnosis:     Shabbir Patel  may return to work on return date  She may return on this date: 10/21/2022         If you have any questions or concerns, please don't hesitate to call        Barbra Basilio MD    ______________________________           _______________          _______________  Hospital Representative                              Date                                Time

## 2022-10-26 ENCOUNTER — HOSPITAL ENCOUNTER (EMERGENCY)
Facility: HOSPITAL | Age: 22
Discharge: HOME | End: 2022-10-26
Attending: EMERGENCY MEDICINE
Payer: MEDICARE

## 2022-10-26 VITALS
HEART RATE: 95 BPM | TEMPERATURE: 98.1 F | SYSTOLIC BLOOD PRESSURE: 136 MMHG | DIASTOLIC BLOOD PRESSURE: 71 MMHG | RESPIRATION RATE: 18 BRPM | OXYGEN SATURATION: 100 %

## 2022-10-26 DIAGNOSIS — R07.89 NON-CARDIAC CHEST PAIN: Primary | ICD-10-CM

## 2022-10-26 LAB
ATRIAL RATE: 75 BPM
FLUAV RNA RESP QL NAA+PROBE: NEGATIVE
FLUBV RNA RESP QL NAA+PROBE: NEGATIVE
P AXIS: 61 DEGREES
PR INTERVAL: 112 MS
QRS AXIS: 75 DEGREES
QRSD INTERVAL: 84 MS
QT INTERVAL: 358 MS
QTC INTERVAL: 399 MS
RSV RNA RESP QL NAA+PROBE: NEGATIVE
SARS-COV-2 RNA RESP QL NAA+PROBE: NEGATIVE
T WAVE AXIS: 35 DEGREES
VENTRICULAR RATE: 75 BPM

## 2022-10-26 PROCEDURE — 93005 ELECTROCARDIOGRAM TRACING: CPT

## 2022-10-26 PROCEDURE — 0241U HB NFCT DS VIR RESP RNA 4 TRGT: CPT | Performed by: STUDENT IN AN ORGANIZED HEALTH CARE EDUCATION/TRAINING PROGRAM

## 2022-10-26 PROCEDURE — 93010 ELECTROCARDIOGRAM REPORT: CPT | Performed by: INTERNAL MEDICINE

## 2022-10-26 RX ORDER — ACETAMINOPHEN 325 MG/1
975 TABLET ORAL ONCE
Status: COMPLETED | OUTPATIENT
Start: 2022-10-26 | End: 2022-10-26

## 2022-10-26 RX ORDER — KETOROLAC TROMETHAMINE 30 MG/ML
15 INJECTION, SOLUTION INTRAMUSCULAR; INTRAVENOUS ONCE
Status: COMPLETED | OUTPATIENT
Start: 2022-10-26 | End: 2022-10-26

## 2022-10-26 RX ADMIN — KETOROLAC TROMETHAMINE 15 MG: 30 INJECTION, SOLUTION INTRAMUSCULAR at 16:10

## 2022-10-26 RX ADMIN — ACETAMINOPHEN 975 MG: 325 TABLET, FILM COATED ORAL at 16:11

## 2022-10-26 NOTE — ED PROVIDER NOTES
History  Chief Complaint   Patient presents with   • Chest Pain     Pt c/o chest pain, dizziness, and sob for the past couple of days  Pt states that she was in ER last week and had an elevated D- Dimer  Patient is a 78-year-old female, past medical history of Crohn's, and HS, who presents to the emergency department for persistent chest pain, dizziness, shortness of breath  Patient states the symptoms started several days ago  She was seen at [facility] for the symptoms  There, CT scan for pulmonary embolism was normal   She was treated with NSAIDs with subsequent improvement of her pain  She states she felt fine up until today when the same symptoms returned  She states this time it is not as severe as last time  She states that the pain and shortness of breath are worse with ambulation and somewhat improved with rest   There are no other modifying factors  Associated symptoms include lightheadedness when she walks, as well as right upper back pain  She denies any fever, chills, nausea vomiting, diarrhea, abdominal pain, or any other new concerning symptoms  Chart reviewed  Patient was seen on 10/19/2022 for identical symptoms  Symptoms had been present for 4 days at that time  She ultimately ended up undergoing a CTA secondary to a positive D-dimer  It was negative  She was diagnosed with costochondritis and discharged  History provided by:  Patient and medical records   used: No        Prior to Admission Medications   Prescriptions Last Dose Informant Patient Reported? Taking?    Multiple Vitamins-Minerals (HAIR SKIN AND NAILS FORMULA PO)  Self Yes No   Sig: Take by mouth   Patient not taking: Reported on 10/19/2022   Stelara 45 MG/0 5ML injection  Self Yes No   clindamycin (CLINDAGEL) 1 % gel  Self No No   Sig: Apply topically 2 (two) times a day   metFORMIN (GLUCOPHAGE-XR) 500 mg 24 hr tablet  Self No No   Sig: TAKE 1 TABLET BY MOUTH EVERY DAY WITH DINNER   naproxen (NAPROSYN) 375 mg tablet   No No   Sig: Take 1 tablet (375 mg total) by mouth 2 (two) times a day with meals      Facility-Administered Medications: None       Past Medical History:   Diagnosis Date   • Crohn's disease ([facility] [address] )    • Fever 12/8/2021   • Hidradenitis suppurativa     thighs   • Nasal congestion 6/1/2022   • Obesity, Class II, BMI 35-39 9    • Sore throat 6/1/2022   • TMJ (temporomandibular joint disorder)     last assessed 02/24/2016   • Urinary tract infection    • Varicella     vaccine       Past Surgical History:   Procedure Laterality Date   • COLONOSCOPY      x several; last one 2/2020   • WISDOM TOOTH EXTRACTION         Family History   Problem Relation Age of Onset   • Diabetes Mother         Due to underlying condition with foot ulcer  Type 2 DM without complication  • Diabetes Father    • Polycystic ovary syndrome Sister    • Lung cancer Maternal Uncle    • Stroke Neg Hx    • Heart attack Neg Hx    • Hypertension Neg Hx    • Breast cancer Neg Hx    • Colon cancer Neg Hx    • Ovarian cancer Neg Hx    • Uterine cancer Neg Hx      I have reviewed and agree with the history as documented  E-Cigarette/Vaping   • E-Cigarette Use Never User      E-Cigarette/Vaping Substances   • Nicotine No    • THC No    • CBD No    • Flavoring No    • Other No    • Unknown No      Social History     Tobacco Use   • Smoking status: Never Smoker   • Smokeless tobacco: Never Used   • Tobacco comment: hooka smoke   Vaping Use   • Vaping Use: Never used   Substance Use Topics   • Alcohol use: No   • Drug use: No        Review of Systems   Constitutional: Negative for chills and fever  Respiratory: Positive for shortness of breath  Negative for cough  Cardiovascular: Positive for chest pain  Negative for leg swelling  Gastrointestinal: Negative for abdominal pain, diarrhea, nausea and vomiting  Neurological: Positive for dizziness  All other systems reviewed and are negative        Physical Exam  ED Triage Vitals [10/26/22 1404]   Temperature Pulse Respirations Blood Pressure SpO2   98 1 °F (36 7 °C) 95 18 136/71 100 %      Temp Source Heart Rate Source Patient Position - Orthostatic VS BP Location FiO2 (%)   Temporal -- Sitting Right arm --      Pain Score       9             Orthostatic Vital Signs  Vitals:    10/26/22 1404   BP: 136/71   Pulse: 95   Patient Position - Orthostatic VS: Sitting       Physical Exam  Vitals and nursing note reviewed  Constitutional:       General: She is not in acute distress  Appearance: She is well-developed  She is not diaphoretic  HENT:      Head: Normocephalic and atraumatic  Right Ear: External ear normal       Left Ear: External ear normal       Nose: Nose normal    Eyes:      General: Lids are normal  No scleral icterus  Cardiovascular:      Rate and Rhythm: Normal rate and regular rhythm  Heart sounds: Normal heart sounds  No murmur heard  No friction rub  No gallop  Pulmonary:      Effort: Pulmonary effort is normal  No respiratory distress  Breath sounds: Normal breath sounds  No wheezing or rales  Abdominal:      Palpations: Abdomen is soft  Tenderness: There is no abdominal tenderness  There is no guarding or rebound  Musculoskeletal:         General: No deformity  Normal range of motion  Cervical back: Normal range of motion and neck supple  Skin:     General: Skin is warm and dry  Neurological:      General: No focal deficit present  Mental Status: She is alert     Psychiatric:         Mood and Affect: Mood normal          Behavior: Behavior normal          ED Medications  Medications   acetaminophen (TYLENOL) tablet 975 mg (975 mg Oral Given 10/26/22 1611)   ketorolac (TORADOL) injection 15 mg (15 mg Intramuscular Given 10/26/22 1610)       Diagnostic Studies  Results Reviewed     Procedure Component Value Units Date/Time    FLU/RSV/COVID - if FLU/RSV clinically relevant [[id number]]  (Normal) Collected: 10/26/22 1610 Lab Status: Final result Specimen: Nares from Nose Updated: 10/26/22 1716     SARS-CoV-2 Negative     INFLUENZA A PCR Negative     INFLUENZA B PCR Negative     RSV PCR Negative    Narrative:      FOR PEDIATRIC PATIENTS - copy/paste COVID Guidelines URL to browser: [email or web address]    SARS-CoV-2 assay is a Nucleic Acid Amplification assay intended for the  qualitative detection of nucleic acid from SARS-CoV-2 in nasopharyngeal  swabs  Results are for the presumptive identification of SARS-CoV-2 RNA  Positive results are indicative of infection with SARS-CoV-2, the virus  causing COVID-19, but do not rule out bacterial infection or co-infection  with other viruses  Laboratories within the [place] and its  territories are required to report all positive results to the appropriate  public health authorities  Negative results do not preclude SARS-CoV-2  infection and should not be used as the sole basis for treatment or other  patient management decisions  Negative results must be combined with  clinical observations, patient history, and epidemiological information  This test has not been FDA cleared or approved  This test has been authorized by FDA under an Emergency Use Authorization  (EUA)  This test is only authorized for the duration of time the  declaration that circumstances exist justifying the authorization of the  emergency use of an in vitro diagnostic tests for detection of SARS-CoV-2  virus and/or diagnosis of COVID-19 infection under section 564(b)(1) of  the Act, 21 U  S C  227ODH-2(W)(3), unless the authorization is terminated  or revoked sooner  The test has been validated but independent review by FDA  and CLIA is pending  Test performed using [employer] Gene[employer]pert: This RT-PCR assay targets N2,  a region unique to SARS-CoV-2   A conserved region in the E-gene was chosen  for pan-Sarbecovirus detection which includes SARS-CoV-2  According to CMS-2020-01-R, this platform meets the definition of high-throughput technology  No orders to display         Procedures  ECG 12 Lead Documentation Only    Date/Time: 10/27/2022 12:34 AM  Performed by: Sage Mortensen DO  Authorized by: Sage Mortensen DO     ECG reviewed by me, the ED Provider: yes    Patient location:  ED  Interpretation:     Interpretation: normal    Rate:     ECG rate:  75    ECG rate assessment: normal    Rhythm:     Rhythm: sinus rhythm    Ectopy:     Ectopy: none    QRS:     QRS axis:  Normal  Conduction:     Conduction: normal    ST segments:     ST segments:  Normal  T waves:     T waves: normal            ED Course  ED Course as of 10/27/22 0034   Wed Oct 26, 2022   1705 Patient re-evaluated  Resting comfortably  Patient states she is feeling better  Will discharge  Recommended PCP follow-up  Recommended cardiology follow-up  Recommended Tylenol or Motrin as needed for pain  Holter monitor ordered  Instructed patient to call tomorrow to schedule a fitting  Return precautions discussed  Patient verbalized understanding and agreed to plan of care  MDM  Number of Diagnoses or Management Options  Diagnosis management comments: Patient is a 19 y o  female who presents to the ED for chest pain, shortness of breath, and lightheadedness  Patient nontoxic, well appearing  Vital stable  Physical exam is unremarkable  Bedside ultrasound does not show any pericardial effusion, B lines, significant wall motion abnormalities, or gallstones  Patient's chest pain is most likely msk  Differential diagnosis includes but is not limited to anxiety, viral illness, asthma (although less likely given no history of asthma and no adventitious lung sounds)   Based on history and physical, there is low suspicion for ACS, acute PE (PERC negative, recent negative CTA), pericarditis / myocarditis, thoracic aortic dissection, pneumothorax, pneumonia or other acute infectious process  Presentation not consistent with other acute, emergent causes of chest pain at this time  No indication for cardiac enzyme testing  Plan: EKG, viral testing,  pain control, reassessment              Portions of the record may have been created with voice recognition software  Occasional wrong word or "sound a like" substitutions may have occurred due to the inherent limitations of voice recognition software  Read the chart carefully and recognize, using context, where substitutions have occurred  Amount and/or Complexity of Data Reviewed  Clinical lab tests: reviewed  Tests in the radiology section of CPT®: reviewed  Tests in the medicine section of CPT®: ordered and reviewed    Risk of Complications, Morbidity, and/or Mortality  Presenting problems: low  Diagnostic procedures: minimal  Management options: low    Patient Progress  Patient progress: stable      Disposition  Final diagnoses:   Non-cardiac chest pain     Time reflects when diagnosis was documented in both MDM as applicable and the Disposition within this note     Time User Action Codes Description Comment    10/26/2022  4:58 PM Dayron Montejo Add [R07 89] Non-cardiac chest pain       ED Disposition     ED Disposition   Discharge    Condition   Stable    Date/Time   Wed Oct 26, 2022  4:55 PM    Comment   Abiodun Mccarthy discharge to home/self care  Follow-up Information     Follow up With Specialties Details Why [address], MD Family Medicine   [address] [facility] Emergency Department Emergency Medicine  As needed [address] [address] [id number]  [address] Emergency Department, [address], Cornelio Machado   97  Scheduling Central Scheduling Schedule an appointment as soon as possible for a visit   [address] [id number]  [facility], [address], [address]   [phone]    [address] [place] [address] Cardiology Schedule an appointment as soon as possible for a visit   [address] [address] [phone] [facility] [address], [facility], [id number] [place] [address], [place], [place], [address]          Discharge Medication List as of 10/26/2022  5:06 PM      CONTINUE these medications which have NOT CHANGED    Details   clindamycin (CLINDAGEL) 1 % gel Apply topically 2 (two) times a day, Starting Wed 6/1/2022, Normal      metFORMIN (GLUCOPHAGE-XR) 500 mg 24 hr tablet TAKE 1 TABLET BY MOUTH EVERY DAY WITH DINNER, Normal      Multiple Vitamins-Minerals (HAIR SKIN AND NAILS FORMULA PO) Take by mouth, Historical Med      naproxen (NAPROSYN) 375 mg tablet Take 1 tablet (375 mg total) by mouth 2 (two) times a day with meals, Starting Wed 10/19/2022, Normal      Stelara 45 MG/0 5ML injection Starting Wed 1/12/2022, Historical Med           Outpatient Discharge Orders   Ambulatory Referral to Cardiology   Standing Status: Future Standing Exp  Date: 10/26/23      Holter monitor   Standing Status: Future Standing Exp  Date: 10/26/26       PDMP Review     None           ED Provider  Attending physically available and evaluated Clive Parks I managed the patient along with the ED Attending      Electronically Signed by         Gamal Perkins DO  10/27/22 0567

## 2022-10-26 NOTE — DISCHARGE INSTRUCTIONS
You were evaluated in the Emergency Department today for chest pain  Your evaluation has shown no signs of medical conditions requiring emergent intervention at this time, however we recommend that you follow up with your primary care physician or your cardiologist as soon as possible for further testing as an outpatient  Please schedule an appointment for follow up with your primary care physician as soon as possible  An order for a Holter monitor was placed  Please call central scheduling to schedule your fitting  Return to the Emergency Department if you experience worsening or uncontrolled chest pain, shortness of breath, light headedness, feeling faint, nausea, vomiting, or any other concerning symptoms

## 2022-10-26 NOTE — ED PROCEDURE NOTE
Procedure  POC Cardiac US    Date/Time: 10/26/2022 4:03 PM  Performed by: Joanie Ferrell MD  Authorized by: Joanie Ferrell MD     Patient location:  ED  Other Assisting Provider: Yes (comment) Luis F Berg)    Procedure details:     Exam Type:  Diagnostic and educational    Indications: chest pain      Assessment / Evaluation for: cardiac function and pericardial effusion      Exam Type: initial exam      Image quality: diagnostic      Image availability:  Images available in PACS  Patient Details:     Cardiac Rhythm:  Regular  Cardiac findings:     Echo technique: limited 2D      Views obtained: parasternal long axis and parasternal short axis      Pericardial effusion: absent      Tamponade physiology: absent      Wall motion: normal      LV systolic function: normal      RV dilation: none    Pulmonary findings:     Left Lung Findings: left lung sliding      Right lung findings: right lung sliding      B-lines: no B-lines present    POC Biliary US    Date/Time: 10/26/2022 4:04 PM  Performed by: Joanie Ferrell MD  Authorized by: Joanie Ferrell MD     Patient location:  ED  Performed by:  Resident  Other Assisting Provider: Yes (comment) Luis F Berg)    Procedure details:     Exam Type:  Diagnostic and educational    Indications: upper right quadrant abdominal pain      Assessment for:  Cholecystitis and cholelithiasis    Views obtained: gallbladder (transverse and longitudinal), liver, common bile duct and portal triad      Image quality: diagnostic      Image availability:  Images available in PACS  Findings:     Cholelithiasis: not identified      Common bile duct:  Normal    Gallbladder wall:  Normal    Pericholecystic fluid: not identified      Sonographic Zapata's sign: negative      Polyps: not identified      Mass: not identified    Interpretation:     Biliary ultrasound impressions: normal gallbladder ultrasound                       Joanie Ferrell MD  10/26/22 1609

## 2022-10-26 NOTE — ED ATTENDING ATTESTATION
10/26/2022  Faheem ORTIZ [facility], DO, saw and evaluated the patient  I have discussed the patient with the resident/non-physician practitioner and agree with the resident's/non-physician practitioner's findings, Plan of Care, and MDM as documented in the resident's/non-physician practitioner's note, except where noted  All available labs and Radiology studies were reviewed  I was present for key portions of any procedure(s) performed by the resident/non-physician practitioner and I was immediately available to provide assistance  At this point I agree with the current assessment done in the Emergency Department  I have conducted an independent evaluation of this patient a history and physical is as follows:    42-year-old female presents with chest pain  Patient was seen emergency department last week for same symptoms  Symptoms are worse at that time  Patient had labs done and CT scan that was negative for PE  Patient states today while at work started to have symptoms again  Patient states she is feeling better now the emergency department  Symptoms are not as severe as last week  No fevers or chills  On exam-no acute distress, heart regular, lungs clear  Plan-EKG, ultrasound to look gallbladder, heart and lungs    If all normal will do Holter monitor, referral to Cardiology and return precautions    ED Course         Critical Care Time  Procedures

## 2022-10-26 NOTE — Clinical Note
Nataliian Schwab was seen and treated in our emergency department on 10/26/2022  No restrictions            Diagnosis:     Zakiyagricelda Ding    She may return on this date: 10/27/2022         If you have any questions or concerns, please don't hesitate to call        Familia Argueta, DO    ______________________________           _______________          _______________  Hospital Representative                              Date                                Time

## 2022-10-26 NOTE — Clinical Note
Fatou Osorio was seen and treated in our emergency department on 10/26/2022  No restrictions            Diagnosis:     Quita aHll    She may return on this date: 10/28/2022         If you have any questions or concerns, please don't hesitate to call        Cyndi Basilio, DO    ______________________________           _______________          _______________  Hospital Representative                              Date                                Time

## 2022-10-27 ENCOUNTER — HOSPITAL ENCOUNTER (OUTPATIENT)
Dept: NON INVASIVE DIAGNOSTICS | Facility: HOSPITAL | Age: 22
Discharge: HOME | End: 2022-10-27
Attending: EMERGENCY MEDICINE
Payer: MEDICARE

## 2022-10-27 DIAGNOSIS — R73.01 IFG (IMPAIRED FASTING GLUCOSE): ICD-10-CM

## 2022-10-27 DIAGNOSIS — R07.89 NON-CARDIAC CHEST PAIN: ICD-10-CM

## 2022-10-27 PROCEDURE — 93225 XTRNL ECG REC<48 HRS REC: CPT

## 2022-10-27 PROCEDURE — 93226 XTRNL ECG REC<48 HR SCAN A/R: CPT

## 2022-10-27 RX ORDER — METFORMIN HYDROCHLORIDE 500 MG/1
TABLET, EXTENDED RELEASE ORAL
Qty: 30 TABLET | Refills: 2 | Status: SHIPPED | OUTPATIENT
Start: 2022-10-27

## 2022-10-28 ENCOUNTER — OFFICE VISIT (OUTPATIENT)
Dept: CARDIOLOGY CLINIC | Facility: CLINIC | Age: 22
End: 2022-10-28
Payer: MEDICARE

## 2022-10-28 VITALS
DIASTOLIC BLOOD PRESSURE: 78 MMHG | HEIGHT: 65 IN | OXYGEN SATURATION: 100 % | WEIGHT: 206.6 LBS | BODY MASS INDEX: 34.42 KG/M2 | SYSTOLIC BLOOD PRESSURE: 126 MMHG | HEART RATE: 86 BPM

## 2022-10-28 DIAGNOSIS — R07.9 CHEST PAIN, UNSPECIFIED TYPE: ICD-10-CM

## 2022-10-28 PROCEDURE — 99244 OFF/OP CNSLTJ NEW/EST MOD 40: CPT | Performed by: INTERNAL MEDICINE

## 2022-10-28 NOTE — PROGRESS NOTES
Cardiology Consultation     Stanislav Moura  [id number]  2000  HEART [facility] VASCULAR [facility] [address] [address]    1  Chest pain, unspecified type  Ambulatory Referral to Cardiology     Chief Complaint   Patient presents with   • New Patient Visit     No cardiac complaints     HPI: Patient is here for cardiac evaluation after ER visit for chest pain  She had 2 evaluations this past month for episodes where she she gets exertional chest pain associated with shortness of breath and dizziness  She was noted to have an elevated d-dimer at first visit, so CT was done, which rule out pulmonary embolism  No associated diaphoresis or nausea  No recent viral illnesses  2 days ago, visit happened when she was sitting in her car on a break at work and she felt suddenly lightheaded and had chest pain and felt like she was about to pass out  Patient feels well today, without complaints  No reported chest pain, shortness of breath, palpitations, lightheadedness, syncope, LE edema, orthopnea, PND, or significant weight changes  Patient remains active without any increased fatigue out of the ordinary        Patient Active Problem List   Diagnosis   • Exacerbation of Crohn's disease ([address] )   • Vitamin deficiency   • Hidradenitis suppurativa   • Class 1 obesity due to excess calories without serious comorbidity in adult   • Crohn's disease of large intestine without complication (HCC)   • Overactive bladder   • Acne vulgaris   • Vitamin D deficiency   • Iron deficiency anemia   • Encounter for general adult medical examination with abnormal findings   • Family history of diabetes mellitus   • IFG (impaired fasting glucose)   • Elevated C-reactive protein (CRP)   • Clostridium difficile infection   • Drug-induced constipation   • Dysuria   • Bacterial vaginosis   • Frequency of urination   • Obesity, Class II, BMI 35-39 9 • Chest pain     Past Medical History:   Diagnosis Date   • Crohn's disease ([facility] [address] )    • Fever 12/8/2021   • Hidradenitis suppurativa     thighs   • Nasal congestion 6/1/2022   • Obesity, Class II, BMI 35-39 9    • Sore throat 6/1/2022   • TMJ (temporomandibular joint disorder)     last assessed 02/24/2016   • Urinary tract infection    • Varicella     vaccine     Social History     Socioeconomic History   • Marital status: Single     Spouse name: Not on file   • Number of children: Not on file   • Years of education: Not on file   • Highest education level: Not on file   Occupational History   • Not on file   Tobacco Use   • Smoking status: Never Smoker   • Smokeless tobacco: Never Used   • Tobacco comment: hooka smoke   Vaping Use   • Vaping Use: Never used   Substance and Sexual Activity   • Alcohol use: No   • Drug use: No   • Sexual activity: Never   Other Topics Concern   • Not on file   Social History Narrative   • Not on file     Social Determinants of Health     Financial Resource Strain: Not on file   Food Insecurity: Not on file   Transportation Needs: Not on file   Physical Activity: Not on file   Stress: Not on file   Social Connections: Not on file   Intimate Partner Violence: Not on file   Housing Stability: Not on file      Family History   Problem Relation Age of Onset   • Diabetes Mother         Due to underlying condition with foot ulcer  Type 2 DM without complication     • Diabetes Father    • Polycystic ovary syndrome Sister    • Lung cancer Maternal Uncle    • Stroke Neg Hx    • Heart attack Neg Hx    • Hypertension Neg Hx    • Breast cancer Neg Hx    • Colon cancer Neg Hx    • Ovarian cancer Neg Hx    • Uterine cancer Neg Hx      Past Surgical History:   Procedure Laterality Date   • COLONOSCOPY      x several; last one 2/2020   • WISDOM TOOTH EXTRACTION         Current Outpatient Medications:   •  clindamycin (CLINDAGEL) 1 % gel, Apply topically 2 (two) times a day, Disp: 30 g, Rfl: 0  • metFORMIN (GLUCOPHAGE-XR) 500 mg 24 hr tablet, TAKE 1 TABLET BY MOUTH EVERY DAY WITH DINNER, Disp: 30 tablet, Rfl: 2  •  Stelara 45 MG/0 5ML injection, , Disp: , Rfl:   •  Multiple Vitamins-Minerals (HAIR SKIN AND NAILS FORMULA PO), Take by mouth (Patient not taking: No sig reported), Disp: , Rfl:   •  naproxen (NAPROSYN) 375 mg tablet, Take 1 tablet (375 mg total) by mouth 2 (two) times a day with meals (Patient not taking: Reported on 10/28/2022), Disp: 20 tablet, Rfl: 0  Allergies   Allergen Reactions   • Infliximab Hives     Difficulty breathing as well      Vitals:    10/28/22 0801   BP: 126/78   BP Location: Left arm   Patient Position: Sitting   Cuff Size: Standard   Pulse: 86   SpO2: 100%   Weight: 93 7 kg (206 lb 9 6 oz)   Height: 5' 4 75" (1 645 m)       Labs:  Admission on 10/26/2022, Discharged on 10/26/2022   Component Date Value   • SARS-CoV-2 10/26/2022 Negative    • INFLUENZA A PCR 10/26/2022 Negative    • INFLUENZA B PCR 10/26/2022 Negative    • RSV PCR 10/26/2022 Negative    • Ventricular Rate 10/26/2022 75    • Atrial Rate 10/26/2022 75    • MA Interval 10/26/2022 112    • QRSD Interval 10/26/2022 84    • QT Interval 10/26/2022 358    • QTC Interval 10/26/2022 399    • P Axis 10/26/2022 61    • QRS Axis 10/26/2022 75    • T Wave Axis 10/26/2022 35    Admission on 10/19/2022, Discharged on 10/19/2022   Component Date Value   • EXT PREG TEST UR (Ref: N* 10/19/2022 negative    • Control 10/19/2022 valid    • WBC 10/19/2022 8 16    • RBC 10/19/2022 4 64    • Hemoglobin 10/19/2022 13 7    • Hematocrit 10/19/2022 42 3    • MCV 10/19/2022 91    • MCH 10/19/2022 29 5    • MCHC 10/19/2022 32 4    • RDW 10/19/2022 12 2    • MPV 10/19/2022 9 5    • Platelets 02/28/5642 269    • nRBC 10/19/2022 0    • Neutrophils Relative 10/19/2022 69    • Immat GRANS % 10/19/2022 0    • Lymphocytes Relative 10/19/2022 19    • Monocytes Relative 10/19/2022 7    • Eosinophils Relative 10/19/2022 4    • Basophils Relative 10/19/2022 1    • Neutrophils Absolute 10/19/2022 5 61    • Immature Grans Absolute 10/19/2022 0 01    • Lymphocytes Absolute 10/19/2022 1 53    • Monocytes Absolute 10/19/2022 0 59    • Eosinophils Absolute 10/19/2022 0 34    • Basophils Absolute 10/19/2022 0 08    • Sodium 10/19/2022 138    • Potassium 10/19/2022 3 9    • Chloride 10/19/2022 105    • CO2 10/19/2022 24    • ANION GAP 10/19/2022 9    • BUN 10/19/2022 14    • Creatinine 10/19/2022 0 81    • Glucose 10/19/2022 102 (A)   • Calcium 10/19/2022 8 8    • AST 10/19/2022 28    • ALT 10/19/2022 44 (A)   • Alkaline Phosphatase 10/19/2022 50    • Total Protein 10/19/2022 7 7    • Albumin 10/19/2022 4 3    • Total Bilirubin 10/19/2022 0 91    • eGFR 10/19/2022 102    • hs TnI 0hr 10/19/2022 2    • D-Dimer, Quant 10/19/2022 0 67 (A)   • Ventricular Rate 10/19/2022 75    • Atrial Rate 10/19/2022 75    • OR Interval 10/19/2022 112    • QRSD Interval 10/19/2022 82    • QT Interval 10/19/2022 374    • QTC Interval 10/19/2022 417    • P Axis 10/19/2022 -10    • QRS Axis 10/19/2022 65    • T Wave Axis 10/19/2022 50    Annual Exam on 09/02/2022   Component Date Value   • Color, UA 09/02/2022 Light Yellow    • Clarity, UA 09/02/2022 Clear    • Specific [place], UA 09/02/2022 1 019    • pH, UA 09/02/2022 6 5    • Leukocytes, UA 09/02/2022 Negative    • Nitrite, UA 09/02/2022 Negative    • Protein, UA 09/02/2022 Negative    • Glucose, UA 09/02/2022 Negative    • Ketones, UA 09/02/2022 Negative    • Urobilinogen, UA 09/02/2022 <2 0    • Bilirubin, UA 09/02/2022 Negative    • Occult Blood, UA 09/02/2022 Negative    • RBC, UA 09/02/2022 1-2    • WBC, UA 09/02/2022 None Seen    • Epithelial Cells 09/02/2022 Occasional    • Bacteria, UA 09/02/2022 None Seen    • Urine Culture 09/02/2022 20,000-29,000 cfu/ml     • WET MOUNT 09/02/2022 -    • Yeast, Wet Prep 09/02/2022 neg    • pH 09/02/2022 -    • Whiff Test 09/02/2022 -    • Clue Cells 09/02/2022 positive    • Trich, Wet Prep 09/02/2022 -    Appointment on 07/22/2022   Component Date Value   • Hepatitis B Surface Ag 07/22/2022 Non-reactive    • QFT Nil 07/22/2022 0 03    • QFT TB1-NIL 07/22/2022 0 00    • QFT TB2-NIL 07/22/2022 0 00    • QFT Mitogen-NIL 07/22/2022 >10 00    • QFT Final Interpretation 07/22/2022 Negative    Office Visit on 07/11/2022   Component Date Value   • Urine Culture 07/11/2022 40,000-49,000 cfu/ml     • Color, UA 07/11/2022 Colorless    • Clarity, UA 07/11/2022 Clear    • Specific Gravity, UA 07/11/2022 1 006    • pH, UA 07/11/2022 7 0    • Leukocytes, UA 07/11/2022 Negative    • Nitrite, UA 07/11/2022 Negative    • Protein, UA 07/11/2022 Negative    • Glucose, UA 07/11/2022 Negative    • Ketones, UA 07/11/2022 Negative    • Urobilinogen, UA 07/11/2022 <2 0    • Bilirubin, UA 07/11/2022 Negative    • Occult Blood, UA 07/11/2022 Negative    • LEUKOCYTE ESTERASE,UA 07/11/2022 neg    • NITRITE,UA 07/11/2022 neg    • SL AMB POCT UROBILINOGEN 07/11/2022 0 2    • POCT URINE PROTEIN 07/11/2022 neg    •  PH,UA 07/11/2022 7 0    • BLOOD,UA 07/11/2022 neg    • SPECIFIC GRAVITY,UA 07/11/2022 1 005    • KETONES,UA 07/11/2022 neg    • BILIRUBIN,UA 07/11/2022 neg    • GLUCOSE, UA 07/11/2022 neg    •  COLOR,UA 07/11/2022 yellow    • CLARITY,UA 07/11/2022 clear    • WET MOUNT 07/11/2022 abnormal    • Yeast, Wet Prep 07/11/2022 -    • pH 07/11/2022 -    • Whiff Test 07/11/2022 -    • Clue Cells 07/11/2022 +    • Trich, Wet Prep 07/11/2022 -    Office Visit on 06/01/2022   Component Date Value   • Throat Culture 06/01/2022 2+ Growth of Beta Hemolytic Streptococcus NOT Group A, C, or G (A)   Appointment on 05/13/2022   Component Date Value   • Hepatitis B Surface Ag 05/13/2022 Non-reactive    • QFT Nil 05/13/2022 0 02    • QFT TB1-NIL 05/13/2022 0 00    • QFT TB2-NIL 05/13/2022 0 00    • QFT Mitogen-NIL 05/13/2022 >10 00    • QFT Final Interpretation 05/13/2022 Negative    Office Visit on 05/05/2022   Component Date Value   • POST-VOID RESIDUAL VOLUM* 05/05/2022 34    Office Visit on 05/03/2022   Component Date Value   • Urine Culture 05/03/2022 10,000-19,000 cfu/ml Alpha Hemolytic Streptococcus NOT Enterococcus (A)   • Color, UA 05/03/2022 Colorless    • Clarity, UA 05/03/2022 Clear    • Specific Gravity, UA 05/03/2022 1 004    • pH, UA 05/03/2022 7 0    • Leukocytes, UA 05/03/2022 Negative    • Nitrite, UA 05/03/2022 Negative    • Protein, UA 05/03/2022 Negative    • Glucose, UA 05/03/2022 Negative    • Ketones, UA 05/03/2022 Negative    • Urobilinogen, UA 05/03/2022 <2 0    • Bilirubin, UA 05/03/2022 Negative    • Occult Blood, UA 05/03/2022 Negative    • LEUKOCYTE ESTERASE,UA 05/03/2022 neg    • NITRITE,UA 05/03/2022 neg    • SL AMB POCT UROBILINOGEN 05/03/2022 0 2    • POCT URINE PROTEIN 05/03/2022 neg    •  PH,UA 05/03/2022 7 5    • BLOOD,UA 05/03/2022 neg    • SPECIFIC GRAVITY,UA 05/03/2022 1 025    • KETONES,UA 05/03/2022 neg    • BILIRUBIN,UA 05/03/2022 neg    • GLUCOSE, UA 05/03/2022 neg    •  COLOR,UA 05/03/2022 light yellow    • CLARITY,UA 05/03/2022 clear      Lab Results   Component Value Date    TRIG 58 03/22/2022    HDL 51 03/22/2022     Imaging: CTA ED chest PE Study    Result Date: 10/19/2022  Narrative: CTA - CHEST WITH IV CONTRAST - PULMONARY ANGIOGRAM INDICATION:   pleuritic chest pain, positive d dimer  Per my review of the medical record, the patient  presents with upper chest pain with inspiration for 4 days  Fatigue  No cough  Crohn's disease on monoclonal antibodies  COMPARISON: CXR 12/31/2021, chest CT 3/23/2021, abdomen CT 3/13/2021  TECHNIQUE: CT angiogram timed for optimal opacification of the pulmonary arteries  Axial, sagittal, and coronal 2D reformats created from source data  Coronal 3D MIP postprocessing on the acquisition scanner  Radiation dose length product (DLP):  414 mGy-cm   Radiation dose exposure minimized using iterative reconstruction and automated exposure control   IV Contrast:  85 mL of iohexol (OMNIPAQUE)  FINDINGS: PULMONARY ARTERIES:  No pulmonary embolus  LUNGS:  Lungs clear  AIRWAYS: No significant filling defects  PLEURA:  Unremarkable  HEART/GREAT VESSELS:  Normal for age  MEDIASTINUM AND GERALDO:  Unremarkable  CHEST WALL AND LOWER NECK: Unremarkable  UPPER ABDOMEN:  Unremarkable  OSSEOUS STRUCTURES:  Normal for age  Impression: No pulmonary embolus  Lungs clear  Workstation performed: [id number]       Review of Systems:  Review of Systems   Constitutional: Negative for activity change, appetite change, chills, diaphoresis, fatigue and unexpected weight change  HENT: Negative for hearing loss, nosebleeds and sore throat  Eyes: Negative for photophobia and visual disturbance  Respiratory: Negative for cough, chest tightness, shortness of breath and wheezing  Cardiovascular: Negative for chest pain, palpitations and leg swelling  Gastrointestinal: Negative for abdominal pain, diarrhea, nausea and vomiting  Endocrine: Negative for polyuria  Genitourinary: Negative for dysuria, frequency and hematuria  Musculoskeletal: Negative for arthralgias, back pain, gait problem and neck pain  Skin: Negative for pallor and rash  Neurological: Negative for dizziness, syncope and headaches  Hematological: Does not bruise/bleed easily  Psychiatric/Behavioral: Negative for behavioral problems and confusion  Physical Exam:  Physical Exam  Vitals reviewed  Constitutional:       Appearance: She is well-developed  She is not diaphoretic  HENT:      Head: Normocephalic and atraumatic  Nose: Nose normal    Eyes:      General: No scleral icterus  Pupils: Pupils are equal, round, and reactive to light  Neck:      Vascular: No JVD  Cardiovascular:      Rate and Rhythm: Normal rate and regular rhythm  Heart sounds: Normal heart sounds  No murmur heard  No friction rub  No gallop     Pulmonary:      Effort: Pulmonary effort is normal  No respiratory distress  Breath sounds: Normal breath sounds  No wheezing or rales  Abdominal:      General: Bowel sounds are normal  There is no distension  Palpations: Abdomen is soft  Tenderness: There is no abdominal tenderness  Musculoskeletal:         General: No deformity  Normal range of motion  Cervical back: Normal range of motion and neck supple  Skin:     General: Skin is warm and dry  Findings: No rash  Neurological:      Mental Status: She is alert and oriented to person, place, and time  Cranial Nerves: No cranial nerve deficit  Psychiatric:         Behavior: Behavior normal        Blood pressure 126/78, pulse 86, height 5' 4 75" (1 645 m), weight 93 7 kg (206 lb 9 6 oz), last menstrual period 10/23/2022, SpO2 100 %, not currently breastfeeding  EKG:  Normal sinus rhythm  Normal ECG    Discussion/Summary:  Chest Pain: unclear etiology  No significant risk factors, but did have an elevated d-dimer level on initial presentation, and had recurrent symptoms  Will screen for cardiac conditions with a stress test to rule out ischemic etiology  Will also check an echocardiogram to rule out structural heart disease  Patient had a Holter monitor completed, results not available

## 2022-11-21 ENCOUNTER — TELEPHONE (OUTPATIENT)
Dept: FAMILY MEDICINE CLINIC | Facility: CLINIC | Age: 22
End: 2022-11-21

## 2022-11-21 DIAGNOSIS — Z11.1 SCREENING FOR TUBERCULOSIS: Primary | ICD-10-CM

## 2022-11-22 ENCOUNTER — APPOINTMENT (OUTPATIENT)
Dept: LAB | Facility: HOSPITAL | Age: 22
End: 2022-11-22

## 2022-11-22 DIAGNOSIS — Z11.1 SCREENING FOR TUBERCULOSIS: ICD-10-CM

## 2022-11-24 LAB
GAMMA INTERFERON BACKGROUND BLD IA-ACNC: 0.04 IU/ML
M TB IFN-G BLD-IMP: NEGATIVE
M TB IFN-G CD4+ BCKGRND COR BLD-ACNC: -0.01 IU/ML
M TB IFN-G CD4+ BCKGRND COR BLD-ACNC: 0 IU/ML
MITOGEN IGNF BCKGRD COR BLD-ACNC: >10 IU/ML

## 2022-12-02 ENCOUNTER — OFFICE VISIT (OUTPATIENT)
Dept: FAMILY MEDICINE CLINIC | Facility: CLINIC | Age: 22
End: 2022-12-02

## 2022-12-02 VITALS
OXYGEN SATURATION: 100 % | BODY MASS INDEX: 34.66 KG/M2 | DIASTOLIC BLOOD PRESSURE: 80 MMHG | WEIGHT: 203 LBS | SYSTOLIC BLOOD PRESSURE: 120 MMHG | TEMPERATURE: 98.9 F | HEIGHT: 64 IN | HEART RATE: 85 BPM

## 2022-12-02 DIAGNOSIS — L65.9 HAIR LOSS: ICD-10-CM

## 2022-12-02 DIAGNOSIS — Z00.01 ENCOUNTER FOR GENERAL ADULT MEDICAL EXAMINATION WITH ABNORMAL FINDINGS: Primary | ICD-10-CM

## 2022-12-02 DIAGNOSIS — E66.09 CLASS 1 OBESITY DUE TO EXCESS CALORIES WITHOUT SERIOUS COMORBIDITY WITH BODY MASS INDEX (BMI) OF 34.0 TO 34.9 IN ADULT: ICD-10-CM

## 2022-12-02 DIAGNOSIS — Z28.21 IMMUNIZATION REFUSED: ICD-10-CM

## 2022-12-02 PROBLEM — E66.811 CLASS 1 OBESITY DUE TO EXCESS CALORIES WITHOUT SERIOUS COMORBIDITY IN ADULT: Status: RESOLVED | Noted: 2020-07-16 | Resolved: 2022-12-02

## 2022-12-02 PROBLEM — R35.0 FREQUENCY OF URINATION: Status: RESOLVED | Noted: 2022-07-11 | Resolved: 2022-12-02

## 2022-12-02 PROBLEM — R30.0 DYSURIA: Status: RESOLVED | Noted: 2022-03-11 | Resolved: 2022-12-02

## 2022-12-02 PROBLEM — E66.811 CLASS 1 OBESITY DUE TO EXCESS CALORIES WITHOUT SERIOUS COMORBIDITY WITH BODY MASS INDEX (BMI) OF 34.0 TO 34.9 IN ADULT: Status: ACTIVE | Noted: 2022-08-09

## 2022-12-02 PROBLEM — R07.9 CHEST PAIN: Status: RESOLVED | Noted: 2022-10-28 | Resolved: 2022-12-02

## 2022-12-02 RX ORDER — IBUPROFEN 600 MG/1
TABLET ORAL
COMMUNITY
Start: 2022-09-07

## 2022-12-02 RX ORDER — TOPIRAMATE 25 MG/1
25 TABLET ORAL 2 TIMES DAILY
COMMUNITY
Start: 2022-10-11

## 2022-12-02 NOTE — ASSESSMENT & PLAN NOTE
BMI 34 84 LB discussed the patient portion control low carb low-fat diet and increased physical activity  Patient follow-up with the weight

## 2022-12-02 NOTE — PROGRESS NOTES
Name: Deyvi Palacios      : 2000      MRN: [id number]  Encounter Provider: Willi Marin MD  Encounter Date: 2022   Encounter department: [facility]     1  Encounter for general adult medical examination with abnormal findings  Assessment & Plan:  Advice and education were given regarding nutrition, aerobic exercises, weight bearing exercises, cardiovascular risk reduction, fall risk reduction, and age appropriate supplements  The patient was counseled regarding instructions for management, risk factor reductions, prognosis, risks and benefits of treatment options, patient and family education, and importance of compliance with treatment  2  Class 1 obesity due to excess calories without serious comorbidity with body mass index (BMI) of 34 0 to 34 9 in adult  Assessment & Plan:  BMI 34 84 LB discussed the patient portion control low carb low-fat diet and increased physical activity  Patient follow-up with the weight       3  Hair loss  Assessment & Plan:  New diagnosis symptomatic hair loss with thinning here recommend for blood work check TSH CBC ferritin JUNG recommend biotin vitamin    Orders:  -     CBC and differential; Future  -     Basic metabolic panel; Future  -     TSH, 3rd generation with Free T4 reflex; Future  -     Ferritin; Future  -     JUNG w/Reflex; Future    4  Immunization refused  Assessment & Plan:  Patient declined flu shot for today             Subjective      Patient here for well exam and she is concerned about the hair loss she been notice is getting worse for the last couple months and her hair thinning specially in front no change in the shampoo or detergent or perfume no rash no bulging spot    Review of Systems   Constitutional: Negative for chills and fever  HENT: Negative for ear pain and sore throat  Eyes: Negative for pain and visual disturbance     Respiratory: Negative for cough and shortness of breath  Cardiovascular: Negative for chest pain and palpitations  Gastrointestinal: Negative for abdominal pain and vomiting  Genitourinary: Negative for dysuria and hematuria  Musculoskeletal: Negative for arthralgias and back pain  Skin: Negative for color change and rash  Hair lose and thinning   Neurological: Negative for seizures and syncope  All other systems reviewed and are negative  Current Outpatient Medications on File Prior to Visit   Medication Sig   • ibuprofen (MOTRIN) 600 mg tablet TAKE 1 TABLET BY MOUTH EVERY 6 HOURS AS NEEDED FOR MILD PAIN (PAIN SCORE 1-3)  • Multiple Vitamins-Minerals (HAIR SKIN AND NAILS FORMULA PO) Take by mouth   • topiramate (TOPAMAX) 25 mg tablet Take 25 mg by mouth 2 (two) times a day   • clindamycin (CLINDAGEL) 1 % gel Apply topically 2 (two) times a day   • metFORMIN (GLUCOPHAGE-XR) 500 mg 24 hr tablet TAKE 1 TABLET BY MOUTH EVERY DAY WITH DINNER   • Stelara 45 MG/0 5ML injection        Objective     /80 (BP Location: Left arm, Patient Position: Sitting)   Pulse 85   Temp 98 9 °F (37 2 °C) (Tympanic)   Ht 5' 4" (1 626 m)   Wt 92 1 kg (203 lb)   LMP 11/24/2022 (Exact Date)   SpO2 100%   Breastfeeding No   BMI 34 84 kg/m²     Physical Exam  Vitals and nursing note reviewed  Constitutional:       General: She is not in acute distress  Appearance: She is well-developed and well-nourished  HENT:      Head: Normocephalic and atraumatic  Right Ear: Tympanic membrane normal       Left Ear: Tympanic membrane normal       Nose: Nose normal       Mouth/Throat:      Pharynx: Oropharynx is clear  Eyes:      Conjunctiva/sclera: Conjunctivae normal    Neck:      Vascular: No JVD  Cardiovascular:      Rate and Rhythm: Normal rate and regular rhythm  Heart sounds: Normal heart sounds  No murmur heard  No friction rub  No gallop     Pulmonary:      Effort: Pulmonary effort is normal       Breath sounds: Normal breath sounds  Abdominal:      General: Bowel sounds are normal       Palpations: Abdomen is soft  Tenderness: There is no abdominal tenderness  Musculoskeletal:         General: No edema  Right lower leg: No edema  Left lower leg: No edema  Skin:     Findings: No erythema or rash  Comments: Thinning hair no dick spot   Neurological:      Mental Status: She is oriented to person, place, and time         Niranjan Shell MD

## 2022-12-02 NOTE — ASSESSMENT & PLAN NOTE
New diagnosis symptomatic hair loss with thinning here recommend for blood work check TSH CBC ferritin JUNG recommend biotin vitamin

## 2022-12-02 NOTE — PROGRESS NOTES
[address] [place] [address] PRIMARY CARE [facility]    NAME: Bart Meza  AGE: 23 y o  SEX: female  : 2000     DATE: 12/3/2022     Assessment and Plan:     Problem List Items Addressed This Visit        Other    Encounter for general adult medical examination with abnormal findings - Primary     Advice and education were given regarding nutrition, aerobic exercises, weight bearing exercises, cardiovascular risk reduction, fall risk reduction, and age appropriate supplements  The patient was counseled regarding instructions for management, risk factor reductions, prognosis, risks and benefits of treatment options, patient and family education, and importance of compliance with treatment  Class 1 obesity due to excess calories without serious comorbidity with body mass index (BMI) of 34 0 to 34 9 in adult     BMI 34 84 LB discussed the patient portion control low carb low-fat diet and increased physical activity  Patient follow-up with the weight          Hair loss     New diagnosis symptomatic hair loss with thinning here recommend for blood work check TSH CBC ferritin JUNG recommend biotin vitamin         Relevant Orders    CBC and differential    Basic metabolic panel    TSH, 3rd generation with Free T4 reflex    Ferritin    JUNG w/Reflex    Immunization refused     Patient declined flu shot for today            Immunizations and preventive care screenings were discussed with patient today  Appropriate education was printed on patient's after visit summary  Counseling:  Alcohol/drug use: discussed moderation in alcohol intake, the recommendations for healthy alcohol use, and avoidance of illicit drug use  Dental Health: discussed importance of regular tooth brushing, flossing, and dental visits    Injury prevention: discussed safety/seat belts, safety helmets, smoke detectors, carbon dioxide detectors, and smoking near bedding or upholstery  Sexual health: discussed sexually transmitted diseases, partner selection, use of condoms, avoidance of unintended pregnancy, and contraceptive alternatives  Exercise: the importance of regular exercise/physical activity was discussed  Recommend exercise 3-5 times per week for at least 30 minutes  BMI Counseling: Body mass index is 34 84 kg/m²  The BMI is above normal  Nutrition recommendations include decreasing portion sizes, decreasing fast food intake and limiting drinks that contain sugar  Exercise recommendations include exercising 3-5 times per week  No pharmacotherapy was ordered  Rationale for BMI follow-up plan is due to patient being overweight or obese  Depression Screening and Follow-up Plan: Patient was screened for depression during today's encounter  They screened negative with a PHQ-2 score of 0  Return in about 1 year (around 12/2/2023)  Chief Complaint:     Chief Complaint   Patient presents with   • Physical Exam      History of Present Illness:     Adult Annual Physical   Patient here for a comprehensive physical exam  The patient reports problems - hair lose  Diet and Physical Activity  Diet/Nutrition: portion control  Exercise: no formal exercise  Depression Screening  PHQ-2/9 Depression Screening    Little interest or pleasure in doing things: 0 - not at all  Feeling down, depressed, or hopeless: 0 - not at all  PHQ-2 Score: 0  PHQ-2 Interpretation: Negative depression screen       General Health  Sleep: sleeps well  Hearing: normal - bilateral   Vision: wears glasses  Dental: regular dental visits  /GYN Health  Last menstrual period: 11/21/22  Contraceptive method: none  History of STDs?: no      Review of Systems:     Review of Systems   Constitutional: Negative for chills and fever  HENT: Negative for ear pain and sore throat  Eyes: Negative for pain and visual disturbance     Respiratory: Negative for cough and shortness of breath  Cardiovascular: Negative for chest pain and palpitations  Gastrointestinal: Negative for abdominal pain and vomiting  Genitourinary: Negative for dysuria and hematuria  Musculoskeletal: Negative for arthralgias and back pain  Skin: Negative for color change and rash  Hair lose and thinning hair   Neurological: Negative for seizures and syncope  All other systems reviewed and are negative       Past Medical History:     Past Medical History:   Diagnosis Date   • Chest pain 10/28/2022   • Class 1 obesity due to excess calories without serious comorbidity in adult 7/16/2020   • Crohn's disease ([facility] [address] )    • Dysuria 3/11/2022   • Fever 12/8/2021   • Frequency of urination 7/11/2022   • Hidradenitis suppurativa     thighs   • Nasal congestion 6/1/2022   • Obesity, Class II, BMI 35-39 9    • Sore throat 6/1/2022   • TMJ (temporomandibular joint disorder)     last assessed 02/24/2016   • Urinary tract infection    • Varicella     vaccine      Past Surgical History:     Past Surgical History:   Procedure Laterality Date   • COLONOSCOPY      x several; last one 2/2020   • WISDOM TOOTH EXTRACTION        Social History:     Social History     Socioeconomic History   • Marital status: Single     Spouse name: None   • Number of children: None   • Years of education: None   • Highest education level: None   Occupational History   • None   Tobacco Use   • Smoking status: Never   • Smokeless tobacco: Never   • Tobacco comments:     hooka smoke   Vaping Use   • Vaping Use: Never used   Substance and Sexual Activity   • Alcohol use: No   • Drug use: No   • Sexual activity: Never   Other Topics Concern   • None   Social History Narrative   • None     Social Determinants of Health     Financial Resource Strain: Not on file   Food Insecurity: Not on file   Transportation Needs: Not on file   Physical Activity: Not on file   Stress: Not on file   Social Connections: Not on file   Intimate Partner Violence: Not on file   Housing Stability: Not on file      Family History:     Family History   Problem Relation Age of Onset   • Diabetes Mother         Due to underlying condition with foot ulcer  Type 2 DM without complication  • Diabetes Father    • Polycystic ovary syndrome Sister    • Lung cancer Maternal Uncle    • Stroke Neg Hx    • Heart attack Neg Hx    • Hypertension Neg Hx    • Breast cancer Neg Hx    • Colon cancer Neg Hx    • Ovarian cancer Neg Hx    • Uterine cancer Neg Hx       Current Medications:     Current Outpatient Medications   Medication Sig Dispense Refill   • ibuprofen (MOTRIN) 600 mg tablet TAKE 1 TABLET BY MOUTH EVERY 6 HOURS AS NEEDED FOR MILD PAIN (PAIN SCORE 1-3)  • Multiple Vitamins-Minerals (HAIR SKIN AND NAILS FORMULA PO) Take by mouth     • topiramate (TOPAMAX) 25 mg tablet Take 25 mg by mouth 2 (two) times a day     • clindamycin (CLINDAGEL) 1 % gel Apply topically 2 (two) times a day 30 g 0   • metFORMIN (GLUCOPHAGE-XR) 500 mg 24 hr tablet TAKE 1 TABLET BY MOUTH EVERY DAY WITH DINNER 30 tablet 2   • Stelara 45 MG/0 5ML injection        No current facility-administered medications for this visit  Allergies: Allergies   Allergen Reactions   • Infliximab Hives     Difficulty breathing as well       Physical Exam:     /80 (BP Location: Left arm, Patient Position: Sitting)   Pulse 85   Temp 98 9 °F (37 2 °C) (Tympanic)   Ht 5' 4" (1 626 m)   Wt 92 1 kg (203 lb)   LMP 11/24/2022 (Exact Date)   SpO2 100%   Breastfeeding No   BMI 34 84 kg/m²     Physical Exam  Vitals and nursing note reviewed  Constitutional:       General: She is not in acute distress  Appearance: She is well-developed  HENT:      Head: Normocephalic and atraumatic  Right Ear: Tympanic membrane normal       Left Ear: Tympanic membrane normal       Nose: Nose normal       Mouth/Throat:      Pharynx: Oropharynx is clear     Eyes:      Conjunctiva/sclera: Conjunctivae normal    Cardiovascular:      Rate and Rhythm: Normal rate and regular rhythm  Heart sounds: No murmur heard  Pulmonary:      Effort: Pulmonary effort is normal  No respiratory distress  Breath sounds: Normal breath sounds  Abdominal:      Palpations: Abdomen is soft  Tenderness: There is no abdominal tenderness  Musculoskeletal:         General: No swelling  Cervical back: Neck supple  Right lower leg: No edema  Left lower leg: No edema  Skin:     General: Skin is warm and dry  Capillary Refill: Capillary refill takes less than 2 seconds  Comments: No erythema no Alopecia   Neurological:      Mental Status: She is alert and oriented to person, place, and time     Psychiatric:         Mood and Affect: Mood normal           Emma Sam MD   [address] [place] [address]

## 2023-01-04 ENCOUNTER — OFFICE VISIT (OUTPATIENT)
Dept: FAMILY MEDICINE CLINIC | Facility: CLINIC | Age: 23
End: 2023-01-04

## 2023-01-04 VITALS
TEMPERATURE: 98.3 F | OXYGEN SATURATION: 100 % | RESPIRATION RATE: 16 BRPM | DIASTOLIC BLOOD PRESSURE: 78 MMHG | HEART RATE: 95 BPM | WEIGHT: 206 LBS | HEIGHT: 64 IN | BODY MASS INDEX: 35.17 KG/M2 | SYSTOLIC BLOOD PRESSURE: 112 MMHG

## 2023-01-04 DIAGNOSIS — K92.1 BLOOD IN STOOL: ICD-10-CM

## 2023-01-04 DIAGNOSIS — R30.0 DYSURIA: Primary | ICD-10-CM

## 2023-01-04 LAB
BACTERIA UR QL AUTO: ABNORMAL /HPF
BILIRUB UR QL STRIP: NEGATIVE
CAOX CRY URNS QL MICRO: ABNORMAL /HPF
CLARITY UR: CLEAR
COLOR UR: YELLOW
GLUCOSE UR STRIP-MCNC: NEGATIVE MG/DL
HGB UR QL STRIP.AUTO: ABNORMAL
KETONES UR STRIP-MCNC: NEGATIVE MG/DL
LEUKOCYTE ESTERASE UR QL STRIP: NEGATIVE
NITRITE UR QL STRIP: NEGATIVE
NON-SQ EPI CELLS URNS QL MICRO: ABNORMAL /HPF
PH UR STRIP.AUTO: 6 [PH]
PROT UR STRIP-MCNC: NEGATIVE MG/DL
RBC #/AREA URNS AUTO: ABNORMAL /HPF
SP GR UR STRIP.AUTO: 1.02 (ref 1–1.03)
UROBILINOGEN UR STRIP-ACNC: <2 MG/DL
WBC #/AREA URNS AUTO: ABNORMAL /HPF

## 2023-01-04 NOTE — PROGRESS NOTES
Name: Bharat Guevara      : 2000      MRN: [id number]  Encounter Provider: Jigna Orozco MD  Encounter Date: 2023   Encounter department: [place] [address]  Dysuria  Assessment & Plan:  Acute symptomatic has been going on for 3 days her urine dip in the office abnormal we will send the urine for culture if it is positive we will treat with antibiotics discussed with patient and she agree    Orders:  -     Urinalysis with microscopic  -     Urine culture    2  Blood in stool  Assessment & Plan:  Patient had it last week or currently did not have any patient has history of Crohn's disease abdomen pain and abdomen distention no fever no diarrhea continue current management recommend patient to call GI for appointment and possible colonoscopy             Subjective      Patient concerned about dysuria for the last 3 days no abdominal pain no flank pain no fever no blood in urine no vaginal bleeding or discharge patient usually gets the similar symptom  Patient concerned about blood in the stool she had it last week patient has history of Crohn's disease and she denies any abdomen pain and no nausea vomiting no diarrhea no weight change patient follow-up with GI periodically    Review of Systems   Constitutional: Negative for chills and fever  HENT: Negative for ear pain and sore throat  Eyes: Negative for pain and visual disturbance  Respiratory: Negative for cough and shortness of breath  Cardiovascular: Negative for chest pain and palpitations  Gastrointestinal: Positive for blood in stool  Negative for abdominal distention, abdominal pain, diarrhea, nausea, rectal pain and vomiting  Genitourinary: Positive for dysuria  Negative for hematuria  Musculoskeletal: Negative for arthralgias and back pain  Skin: Negative for color change and rash  Neurological: Negative for seizures and syncope     All other systems reviewed and are negative  Current Outpatient Medications on File Prior to Visit   Medication Sig   • clindamycin (CLINDAGEL) 1 % gel Apply topically 2 (two) times a day   • ibuprofen (MOTRIN) 600 mg tablet TAKE 1 TABLET BY MOUTH EVERY 6 HOURS AS NEEDED FOR MILD PAIN (PAIN SCORE 1-3)  • metFORMIN (GLUCOPHAGE-XR) 500 mg 24 hr tablet TAKE 1 TABLET BY MOUTH EVERY DAY WITH DINNER   • Multiple Vitamins-Minerals (HAIR SKIN AND NAILS FORMULA PO) Take by mouth   • Stelara 45 MG/0 5ML injection    • topiramate (TOPAMAX) 25 mg tablet Take 25 mg by mouth 2 (two) times a day       Objective     /78 (BP Location: Left arm, Patient Position: Sitting, Cuff Size: Large)   Pulse 95   Temp 98 3 °F (36 8 °C) (Tympanic)   Resp 16   Ht 5' 4" (1 626 m)   Wt 93 4 kg (206 lb)   SpO2 100%   BMI 35 36 kg/m²     Physical Exam  Vitals and nursing note reviewed  Constitutional:       General: She is not in acute distress  Appearance: She is well-developed  HENT:      Head: Normocephalic and atraumatic  Nose: No congestion  Mouth/Throat:      Pharynx: No oropharyngeal exudate  Eyes:      Conjunctiva/sclera: Conjunctivae normal    Neck:      Vascular: No JVD  Cardiovascular:      Rate and Rhythm: Normal rate and regular rhythm  Heart sounds: Normal heart sounds  No murmur heard  No friction rub  No gallop  Pulmonary:      Effort: Pulmonary effort is normal       Breath sounds: Normal breath sounds  Abdominal:      General: Bowel sounds are normal  There is no distension  Palpations: Abdomen is soft  Tenderness: There is no abdominal tenderness  There is no rebound  Skin:     Findings: No rash  Neurological:      Mental Status: She is oriented to person, place, and time         Sallee Goldmann, MD

## 2023-01-04 NOTE — ASSESSMENT & PLAN NOTE
Acute symptomatic has been going on for 3 days her urine dip in the office abnormal we will send the urine for culture if it is positive we will treat with antibiotics discussed with patient and she agree

## 2023-01-05 ENCOUNTER — APPOINTMENT (OUTPATIENT)
Dept: LAB | Facility: HOSPITAL | Age: 23
End: 2023-01-05

## 2023-01-05 DIAGNOSIS — L65.9 HAIR LOSS: Primary | ICD-10-CM

## 2023-01-05 LAB
ANION GAP SERPL CALCULATED.3IONS-SCNC: 10 MMOL/L (ref 5–14)
BACTERIA UR CULT: NORMAL
BASOPHILS # BLD AUTO: 0.11 THOUSANDS/ÂΜL (ref 0–0.1)
BASOPHILS NFR BLD AUTO: 1 % (ref 0–1)
BUN SERPL-MCNC: 15 MG/DL (ref 5–25)
CALCIUM SERPL-MCNC: 9.6 MG/DL (ref 8.4–10.2)
CHLORIDE SERPL-SCNC: 105 MMOL/L (ref 96–108)
CO2 SERPL-SCNC: 26 MMOL/L (ref 21–32)
CREAT SERPL-MCNC: 0.91 MG/DL (ref 0.6–1.2)
EOSINOPHIL # BLD AUTO: 0.29 THOUSAND/ÂΜL (ref 0–0.61)
EOSINOPHIL NFR BLD AUTO: 3 % (ref 0–6)
ERYTHROCYTE [DISTWIDTH] IN BLOOD BY AUTOMATED COUNT: 12.5 % (ref 11.6–15.1)
FERRITIN SERPL-MCNC: 101 NG/ML (ref 8–388)
GFR SERPL CREATININE-BSD FRML MDRD: 88 ML/MIN/1.73SQ M
GLUCOSE P FAST SERPL-MCNC: 88 MG/DL (ref 70–99)
HCT VFR BLD AUTO: 44.3 % (ref 34.8–46.1)
HGB BLD-MCNC: 14.8 G/DL (ref 11.5–15.4)
IMM GRANULOCYTES # BLD AUTO: 0.03 THOUSAND/UL (ref 0–0.2)
IMM GRANULOCYTES NFR BLD AUTO: 0 % (ref 0–2)
LYMPHOCYTES # BLD AUTO: 1.92 THOUSANDS/ÂΜL (ref 0.6–4.47)
LYMPHOCYTES NFR BLD AUTO: 22 % (ref 14–44)
MCH RBC QN AUTO: 30.7 PG (ref 26.8–34.3)
MCHC RBC AUTO-ENTMCNC: 33.4 G/DL (ref 31.4–37.4)
MCV RBC AUTO: 92 FL (ref 82–98)
MONOCYTES # BLD AUTO: 0.63 THOUSAND/ÂΜL (ref 0.17–1.22)
MONOCYTES NFR BLD AUTO: 7 % (ref 4–12)
NEUTROPHILS # BLD AUTO: 5.83 THOUSANDS/ÂΜL (ref 1.85–7.62)
NEUTS SEG NFR BLD AUTO: 67 % (ref 43–75)
NRBC BLD AUTO-RTO: 0 /100 WBCS
PLATELET # BLD AUTO: 337 THOUSANDS/UL (ref 149–390)
PMV BLD AUTO: 9.7 FL (ref 8.9–12.7)
POTASSIUM SERPL-SCNC: 4.1 MMOL/L (ref 3.5–5.3)
RBC # BLD AUTO: 4.82 MILLION/UL (ref 3.81–5.12)
SODIUM SERPL-SCNC: 141 MMOL/L (ref 135–147)
TSH SERPL DL<=0.05 MIU/L-ACNC: 0.91 UIU/ML (ref 0.45–4.5)
WBC # BLD AUTO: 8.81 THOUSAND/UL (ref 4.31–10.16)

## 2023-01-06 ENCOUNTER — TELEPHONE (OUTPATIENT)
Dept: FAMILY MEDICINE CLINIC | Facility: CLINIC | Age: 23
End: 2023-01-06

## 2023-01-06 DIAGNOSIS — L70.0 ACNE VULGARIS: ICD-10-CM

## 2023-01-06 LAB — ANA SER QL IA: NEGATIVE

## 2023-01-06 RX ORDER — CLINDAMYCIN PHOSPHATE 10 MG/G
GEL TOPICAL 2 TIMES DAILY
Qty: 30 G | Refills: 0 | Status: SHIPPED | OUTPATIENT
Start: 2023-01-06

## 2023-01-12 ENCOUNTER — APPOINTMENT (OUTPATIENT)
Dept: LAB | Facility: HOSPITAL | Age: 23
End: 2023-01-12

## 2023-01-12 DIAGNOSIS — K51.919 MILD CHRONIC ULCERATIVE COLITIS WITH COMPLICATION (HCC): ICD-10-CM

## 2023-01-12 DIAGNOSIS — R10.9 ABDOMINAL CRAMPING: ICD-10-CM

## 2023-01-12 DIAGNOSIS — K50.119 CROHN'S DISEASE OF COLON WITH COMPLICATION (HCC): ICD-10-CM

## 2023-01-12 LAB
ALBUMIN SERPL BCP-MCNC: 4.2 G/DL (ref 3.5–5)
ALP SERPL-CCNC: 61 U/L (ref 43–122)
ALT SERPL W P-5'-P-CCNC: 17 U/L
ANION GAP SERPL CALCULATED.3IONS-SCNC: 5 MMOL/L (ref 5–14)
AST SERPL W P-5'-P-CCNC: 25 U/L (ref 14–36)
BILIRUB SERPL-MCNC: 0.88 MG/DL (ref 0.2–1)
BUN SERPL-MCNC: 13 MG/DL (ref 5–25)
CALCIUM SERPL-MCNC: 9.3 MG/DL (ref 8.4–10.2)
CHLORIDE SERPL-SCNC: 107 MMOL/L (ref 96–108)
CO2 SERPL-SCNC: 27 MMOL/L (ref 21–32)
CREAT SERPL-MCNC: 0.85 MG/DL (ref 0.6–1.2)
CRP SERPL QL: 7.5 MG/L
ERYTHROCYTE [DISTWIDTH] IN BLOOD BY AUTOMATED COUNT: 12.2 % (ref 11.6–15.1)
GFR SERPL CREATININE-BSD FRML MDRD: 96 ML/MIN/1.73SQ M
GLUCOSE P FAST SERPL-MCNC: 94 MG/DL (ref 70–99)
HCT VFR BLD AUTO: 44.8 % (ref 34.8–46.1)
HGB BLD-MCNC: 14.3 G/DL (ref 11.5–15.4)
MCH RBC QN AUTO: 29.7 PG (ref 26.8–34.3)
MCHC RBC AUTO-ENTMCNC: 31.9 G/DL (ref 31.4–37.4)
MCV RBC AUTO: 93 FL (ref 82–98)
PLATELET # BLD AUTO: 309 THOUSANDS/UL (ref 149–390)
PMV BLD AUTO: 9.4 FL (ref 8.9–12.7)
POTASSIUM SERPL-SCNC: 4.2 MMOL/L (ref 3.5–5.3)
PROT SERPL-MCNC: 7.9 G/DL (ref 6.4–8.4)
RBC # BLD AUTO: 4.82 MILLION/UL (ref 3.81–5.12)
SODIUM SERPL-SCNC: 139 MMOL/L (ref 135–147)
WBC # BLD AUTO: 7.31 THOUSAND/UL (ref 4.31–10.16)

## 2023-01-16 LAB — CALPROTECTIN STL-MCNT: 279 UG/G (ref 0–120)

## 2023-01-17 ENCOUNTER — TELEMEDICINE (OUTPATIENT)
Dept: FAMILY MEDICINE CLINIC | Facility: CLINIC | Age: 23
End: 2023-01-17

## 2023-01-17 DIAGNOSIS — J01.00 ACUTE NON-RECURRENT MAXILLARY SINUSITIS: Primary | ICD-10-CM

## 2023-01-17 RX ORDER — AMOXICILLIN 500 MG/1
500 CAPSULE ORAL EVERY 12 HOURS SCHEDULED
Qty: 14 CAPSULE | Refills: 0 | Status: SHIPPED | OUTPATIENT
Start: 2023-01-17 | End: 2023-01-24

## 2023-01-17 NOTE — PROGRESS NOTES
COVID-19 Outpatient Progress Note    Assessment/Plan:    Problem List Items Addressed This Visit        Respiratory    Acute non-recurrent maxillary sinusitis - Primary     Acute symptomatic not responding to conservative treatments x2 weeks  Negative at home COVID rapid  Denies shortness of breath chest pain  Is currently taking Mucinex without relief  Will recommend increase fluids vitamin C start amoxicillin 500 mg twice daily x7 days, and call with worsening of symptoms  Educated on side effects and proper use of medication         Relevant Medications    amoxicillin (AMOXIL) 500 mg capsule      Disposition:     After clarifying the patient's history, my suspicion for COVID-19 infection is very low  Discussed symptom directed medication options with patient  Discussed vitamin D, vitamin C, and/or zinc supplementation with patient  Neg at home covid    I have spent 15 minutes directly with the patient  Greater than 50% of this time was spent in counseling/coordination of care regarding: instructions for management and patient and family education  Encounter provider: SPENCER Hobson     Provider located at: [address]  [address] [facility] [address] [phone]     Recent Visits  No visits were found meeting these conditions  Showing recent visits within past 7 days and meeting all other requirements  Today's Visits  Date Type Provider Dept   01/17/23 Telemedicine [place] [address] [facility], [address] [facility] today's visits and meeting all other requirements  Future Appointments  No visits were found meeting these conditions  Showing future appointments within next 150 days and meeting all other requirements     This virtual check-in was done via [address] and patient was informed that this is a secure, HIPAA-compliant platform  She agrees to proceed      Patient agrees to participate in a virtual check in via telephone or video visit instead of presenting to the office to address urgent/immediate medical needs  Patient is aware this is a billable service  She acknowledged consent and understanding of privacy and security of the video platform  The patient has agreed to participate and understands they can discontinue the visit at any time  After connecting through [facility], the patient was identified by name and date of birth  Elsi Flor was informed that this was a telemedicine visit and that the exam was being conducted confidentially over secure lines  My office door was closed  No one else was in the room  Elsi Flor acknowledged consent and understanding of privacy and security of the telemedicine visit  I informed the patient that I have reviewed her record in Epic and presented the opportunity for her to ask any questions regarding the visit today  The patient agreed to participate  Verification of patient location:  Patient is located in the following state in which I hold an active license: [place]    Subjective:   Elsi Flor is a 23 y o  female who is concerned about COVID-19  Patient's symptoms include nasal congestion, sore throat and cough  Patient denies fever, chills, fatigue, malaise, rhinorrhea, anosmia, loss of taste, shortness of breath, chest tightness, abdominal pain, nausea, vomiting, diarrhea, myalgias and headaches       - Date of symptom onset: 1/3/2023      COVID-19 vaccination status: Fully vaccinated (primary series)    Exposure:   Contact with a person who is under investigation (PUI) for or who is positive for COVID-19 within the last 14 days?: No    Hospitalized recently for fever and/or lower respiratory symptoms?: No      Currently a healthcare worker that is involved in direct patient care?: No      Works in a special setting where the risk of COVID-19 transmission may be high? (this may include long-term care, correctional and correction facilities; homeless shelters; assisted-living facilities and group homes ): No      Resident in a special setting where the risk of COVID-19 transmission may be high? (this may include long-term care, correctional and correction facilities; homeless shelters; assisted-living facilities and group homes ): No      Lab Results   Component Value Date    SARSCOV2 Negative 10/26/2022    [address] [facility] [address] [facility] Not Detected 10/31/2021       Review of Systems   Constitutional: Negative for chills, fatigue and fever  HENT: Positive for congestion, postnasal drip and sore throat  Negative for rhinorrhea  Respiratory: Positive for cough  Negative for chest tightness and shortness of breath  Cardiovascular: Negative for chest pain  Gastrointestinal: Negative for abdominal pain, diarrhea, nausea and vomiting  Musculoskeletal: Negative for myalgias  Neurological: Negative for headaches  Current Outpatient Medications on File Prior to Visit   Medication Sig   • clindamycin (CLINDAGEL) 1 % gel Apply topically 2 (two) times a day   • ibuprofen (MOTRIN) 600 mg tablet TAKE 1 TABLET BY MOUTH EVERY 6 HOURS AS NEEDED FOR MILD PAIN (PAIN SCORE 1-3)  • metFORMIN (GLUCOPHAGE-XR) 500 mg 24 hr tablet TAKE 1 TABLET BY MOUTH EVERY DAY WITH DINNER   • Multiple Vitamins-Minerals (HAIR SKIN AND NAILS FORMULA PO) Take by mouth   • Stelara 45 MG/0 5ML injection    • topiramate (TOPAMAX) 25 mg tablet Take 25 mg by mouth 2 (two) times a day       Objective: There were no vitals taken for this visit  Physical Exam  Vitals and nursing note reviewed  Constitutional:       General: She is not in acute distress  Appearance: Normal appearance  She is ill-appearing  She is not toxic-appearing or diaphoretic  HENT:      Head: Normocephalic and atraumatic  Nose: No rhinorrhea  Eyes:      General:         Right eye: No discharge  Left eye: No discharge     Pulmonary:      Effort: Pulmonary effort is normal  No respiratory distress  Musculoskeletal:         General: Normal range of motion  Cervical back: Normal range of motion  Skin:     Coloration: Skin is not jaundiced or pale  Findings: No bruising, erythema, lesion or rash  Neurological:      Mental Status: She is alert and oriented to person, place, and time  Psychiatric:         Mood and Affect: Mood normal          Behavior: Behavior normal          Thought Content:  Thought content normal          Judgment: Judgment normal        Lanette Janessa Loud

## 2023-01-17 NOTE — ASSESSMENT & PLAN NOTE
Acute symptomatic not responding to conservative treatments x2 weeks  Negative at home COVID rapid  Denies shortness of breath chest pain  Is currently taking Mucinex without relief  Will recommend increase fluids vitamin C start amoxicillin 500 mg twice daily x7 days, and call with worsening of symptoms    Educated on side effects and proper use of medication

## 2023-02-14 DIAGNOSIS — R73.01 IFG (IMPAIRED FASTING GLUCOSE): ICD-10-CM

## 2023-02-14 RX ORDER — METFORMIN HYDROCHLORIDE 500 MG/1
TABLET, EXTENDED RELEASE ORAL
Qty: 30 TABLET | Refills: 2 | Status: SHIPPED | OUTPATIENT
Start: 2023-02-14

## 2023-02-15 ENCOUNTER — TELEMEDICINE (OUTPATIENT)
Dept: FAMILY MEDICINE CLINIC | Facility: CLINIC | Age: 23
End: 2023-02-15

## 2023-02-15 DIAGNOSIS — J02.8 ACUTE PHARYNGITIS DUE TO OTHER SPECIFIED ORGANISMS: Primary | ICD-10-CM

## 2023-02-15 RX ORDER — AMOXICILLIN 500 MG/1
500 CAPSULE ORAL EVERY 12 HOURS SCHEDULED
Qty: 14 CAPSULE | Refills: 0 | Status: SHIPPED | OUTPATIENT
Start: 2023-02-15 | End: 2023-02-22

## 2023-02-15 NOTE — PROGRESS NOTES
Virtual Regular Visit    Verification of patient location:    Patient is located in the following state in which I hold an active license [place]      Assessment/Plan:    Problem List Items Addressed This Visit        Digestive    Acute pharyngitis due to other specified organisms - Primary     Acute symptomatic x 4 days and worsening  Associated with redness, swelling, trouble swallowing and sore throat  Not willing to come to office  At home rapid covid negative  Will recommend start amox 500mg BID x 7 days and call with worsening of symptoms or with no improvement needs to be seen in office, patient verbalized understanding  Educated on s/e and proper use of medication  Relevant Medications    amoxicillin (AMOXIL) 500 mg capsule            Reason for visit is   Chief Complaint   Patient presents with   • Virtual Regular Visit        Encounter provider La Santos, [facility]    Provider located at [facility] AT [address] [address] [phone]      Recent Visits  Date Type Provider Dept   02/15/23 Telemedicine [place] [address] recent visits within past 7 days and meeting all other requirements  Future Appointments  No visits were found meeting these conditions  Showing future appointments within next 150 days and meeting all other requirements       The patient was identified by name and date of birth  Sivakumar Pardo was informed that this is a telemedicine visit and that the visit is being conducted through the [employer]  She agrees to proceed     My office door was closed  No one else was in the room  She acknowledged consent and understanding of privacy and security of the video platform  The patient has agreed to participate and understands they can discontinue the visit at any time  Patient is aware this is a billable service  Subjective  Edie Russell is a 23 y o  female    Patient with virtual visit with c/o redness swelling throat pain with swallowing and severe sore throat x 4 days  Patient is unable to come to office-encouraged of such  Denies fever, nc/rhinorrhea  Taking otc meds w/o relief  Past Medical History:   Diagnosis Date   • Chest pain 10/28/2022   • Class 1 obesity due to excess calories without serious comorbidity in adult 7/16/2020   • Crohn's disease ([address] )    • Dysuria 3/11/2022   • Fever 12/8/2021   • Frequency of urination 7/11/2022   • Hidradenitis suppurativa     thighs   • Nasal congestion 6/1/2022   • Obesity, Class II, BMI 35-39 9    • Sore throat 6/1/2022   • TMJ (temporomandibular joint disorder)     last assessed 02/24/2016   • Urinary tract infection    • Varicella     vaccine       Past Surgical History:   Procedure Laterality Date   • COLONOSCOPY      x several; last one 2/2020   • WISDOM TOOTH EXTRACTION         Current Outpatient Medications   Medication Sig Dispense Refill   • amoxicillin (AMOXIL) 500 mg capsule Take 1 capsule (500 mg total) by mouth every 12 (twelve) hours for 7 days 14 capsule 0   • clindamycin (CLINDAGEL) 1 % gel Apply topically 2 (two) times a day 30 g 0   • ibuprofen (MOTRIN) 600 mg tablet TAKE 1 TABLET BY MOUTH EVERY 6 HOURS AS NEEDED FOR MILD PAIN (PAIN SCORE 1-3)  • metFORMIN (GLUCOPHAGE-XR) 500 mg 24 hr tablet TAKE 1 TABLET BY MOUTH EVERY DAY WITH DINNER 30 tablet 2   • Multiple Vitamins-Minerals (HAIR SKIN AND NAILS FORMULA PO) Take by mouth     • Stelara 45 MG/0 5ML injection      • topiramate (TOPAMAX) 25 mg tablet Take 25 mg by mouth 2 (two) times a day       No current facility-administered medications for this visit  Allergies   Allergen Reactions   • Infliximab Hives     Difficulty breathing as well        Review of Systems   Constitutional: Negative for activity change, appetite change, chills, fatigue and fever     HENT: Positive for sore throat and trouble swallowing  Negative for congestion, postnasal drip, rhinorrhea and sneezing  Respiratory: Negative for cough, chest tightness, shortness of breath and wheezing  Cardiovascular: Negative for chest pain and palpitations  Gastrointestinal: Negative for abdominal pain, constipation, diarrhea, nausea and vomiting  Musculoskeletal: Negative for arthralgias and myalgias  Skin: Negative for color change, pallor and rash  Neurological: Negative for dizziness, weakness, light-headedness and headaches  Hematological: Negative for adenopathy  Psychiatric/Behavioral: Negative for agitation and confusion  Video Exam    There were no vitals filed for this visit  Physical Exam  Vitals and nursing note reviewed  Constitutional:       General: She is not in acute distress  Appearance: Normal appearance  She is ill-appearing  She is not diaphoretic  HENT:      Head: Normocephalic and atraumatic  Nose: No congestion or rhinorrhea  Eyes:      General: No scleral icterus  Right eye: No discharge  Left eye: No discharge  Pulmonary:      Effort: Pulmonary effort is normal  No respiratory distress  Musculoskeletal:         General: Normal range of motion  Cervical back: Normal range of motion  Skin:     Coloration: Skin is not jaundiced or pale  Findings: No bruising, erythema or rash  Neurological:      General: No focal deficit present  Mental Status: She is alert and oriented to person, place, and time  Psychiatric:         Mood and Affect: Mood normal          Behavior: Behavior normal          Thought Content:  Thought content normal          Judgment: Judgment normal           I spent 15 minutes directly with the patient during this visit

## 2023-02-16 PROBLEM — J02.8 ACUTE PHARYNGITIS DUE TO OTHER SPECIFIED ORGANISMS: Status: ACTIVE | Noted: 2023-02-16

## 2023-02-16 NOTE — ASSESSMENT & PLAN NOTE
Acute symptomatic x 4 days and worsening  Associated with redness, swelling, trouble swallowing and sore throat  Not willing to come to office  At home rapid covid negative  Will recommend start amox 500mg BID x 7 days and call with worsening of symptoms or with no improvement needs to be seen in office, patient verbalized understanding  Educated on s/e and proper use of medication

## 2023-03-05 ENCOUNTER — TELEPHONE (OUTPATIENT)
Dept: OTHER | Facility: OTHER | Age: 23
End: 2023-03-05

## 2023-03-06 NOTE — TELEPHONE ENCOUNTER
Patient called to cancel her appointment due to a family emergency   She will be calling to reschedule

## 2023-03-18 PROBLEM — J01.00 ACUTE NON-RECURRENT MAXILLARY SINUSITIS: Status: RESOLVED | Noted: 2023-01-17 | Resolved: 2023-03-18

## 2023-03-28 ENCOUNTER — APPOINTMENT (OUTPATIENT)
Dept: LAB | Facility: HOSPITAL | Age: 23
End: 2023-03-28

## 2023-03-28 DIAGNOSIS — K51.911 CHRONIC ULCERATIVE COLITIS WITH RECTAL BLEEDING, UNSPECIFIED LOCATION (HCC): ICD-10-CM

## 2023-03-28 LAB
ALBUMIN SERPL BCP-MCNC: 4 G/DL (ref 3.5–5)
ALP SERPL-CCNC: 58 U/L (ref 34–104)
ALT SERPL W P-5'-P-CCNC: 21 U/L (ref 7–52)
ANION GAP SERPL CALCULATED.3IONS-SCNC: 8 MMOL/L (ref 4–13)
AST SERPL W P-5'-P-CCNC: 13 U/L (ref 13–39)
BILIRUB SERPL-MCNC: 0.42 MG/DL (ref 0.2–1)
BUN SERPL-MCNC: 22 MG/DL (ref 5–25)
CALCIUM SERPL-MCNC: 9.5 MG/DL (ref 8.4–10.2)
CHLORIDE SERPL-SCNC: 101 MMOL/L (ref 96–108)
CO2 SERPL-SCNC: 28 MMOL/L (ref 21–32)
CREAT SERPL-MCNC: 0.77 MG/DL (ref 0.6–1.3)
ERYTHROCYTE [DISTWIDTH] IN BLOOD BY AUTOMATED COUNT: 12.3 % (ref 11.6–15.1)
GFR SERPL CREATININE-BSD FRML MDRD: 108 ML/MIN/1.73SQ M
GLUCOSE P FAST SERPL-MCNC: 92 MG/DL (ref 65–99)
HCT VFR BLD AUTO: 43 % (ref 34.8–46.1)
HGB BLD-MCNC: 13.7 G/DL (ref 11.5–15.4)
MCH RBC QN AUTO: 30.8 PG (ref 26.8–34.3)
MCHC RBC AUTO-ENTMCNC: 31.9 G/DL (ref 31.4–37.4)
MCV RBC AUTO: 97 FL (ref 82–98)
PLATELET # BLD AUTO: 328 THOUSANDS/UL (ref 149–390)
PMV BLD AUTO: 9.5 FL (ref 8.9–12.7)
POTASSIUM SERPL-SCNC: 4.3 MMOL/L (ref 3.5–5.3)
PROT SERPL-MCNC: 6.9 G/DL (ref 6.4–8.4)
RBC # BLD AUTO: 4.45 MILLION/UL (ref 3.81–5.12)
SODIUM SERPL-SCNC: 137 MMOL/L (ref 135–147)
VZV IGG SER QL IA: ABNORMAL
WBC # BLD AUTO: 12.14 THOUSAND/UL (ref 4.31–10.16)

## 2023-04-04 ENCOUNTER — OFFICE VISIT (OUTPATIENT)
Dept: FAMILY MEDICINE CLINIC | Facility: CLINIC | Age: 23
End: 2023-04-04

## 2023-04-04 VITALS
DIASTOLIC BLOOD PRESSURE: 68 MMHG | TEMPERATURE: 99 F | HEIGHT: 64 IN | WEIGHT: 223 LBS | OXYGEN SATURATION: 95 % | HEART RATE: 99 BPM | SYSTOLIC BLOOD PRESSURE: 110 MMHG | BODY MASS INDEX: 38.07 KG/M2

## 2023-04-04 DIAGNOSIS — K50.10 CROHN'S DISEASE OF LARGE INTESTINE WITHOUT COMPLICATION (HCC): ICD-10-CM

## 2023-04-04 DIAGNOSIS — R30.0 DYSURIA: Primary | ICD-10-CM

## 2023-04-04 LAB
BACTERIA UR QL AUTO: NORMAL /HPF
BILIRUB UR QL STRIP: NEGATIVE
CLARITY UR: CLEAR
COLOR UR: COLORLESS
GLUCOSE UR STRIP-MCNC: NEGATIVE MG/DL
HGB UR QL STRIP.AUTO: NEGATIVE
KETONES UR STRIP-MCNC: NEGATIVE MG/DL
LEUKOCYTE ESTERASE UR QL STRIP: NEGATIVE
NITRITE UR QL STRIP: NEGATIVE
NON-SQ EPI CELLS URNS QL MICRO: NORMAL /HPF
PH UR STRIP.AUTO: 6.5 [PH]
PROT UR STRIP-MCNC: NEGATIVE MG/DL
RBC #/AREA URNS AUTO: NORMAL /HPF
SP GR UR STRIP.AUTO: 1.01 (ref 1–1.03)
UROBILINOGEN UR STRIP-ACNC: <2 MG/DL
WBC #/AREA URNS AUTO: NORMAL /HPF

## 2023-04-04 RX ORDER — PREDNISONE 1 MG/1
15 TABLET ORAL DAILY
COMMUNITY
Start: 2023-03-25

## 2023-04-04 NOTE — PROGRESS NOTES
Name: Quinn Gibbs      : 2000      MRN: [id number]  Encounter Provider: SPENCER Santos  Encounter Date: 2023   Encounter department: [place] [address]  Dysuria  Assessment & Plan:  Acute symptomatic x 2 days frequent urination and dysuria  Denies fever  Neg bilateral CVA  Mild improvement today  In office urine dip neg  Will recommend UA, C&S, increase fluids  Call with worsening of symptoms  Orders:  -     Urinalysis with microscopic  -     Urine culture; Future  -     Urine culture    2  Crohn's disease of large intestine without complication ([facility] [address] )  Assessment & Plan:  Seeing  GI, requesting second opinion through [facility], will refer to GI  Orders:  -     Ambulatory Referral to Gastroenterology; Future           Subjective      Patient with onset frequent urination, dysuria and lbp x 2 days  Some improvement today  Denies fever  - CVA tenderness bilateral  Also, requesting referral for [facility] GI for crohn's  Review of Systems   Constitutional: Negative for activity change, appetite change, chills, fatigue and fever  HENT: Negative for congestion, postnasal drip, rhinorrhea, sneezing and sore throat  Respiratory: Negative for cough, chest tightness, shortness of breath and wheezing  Cardiovascular: Negative for chest pain and palpitations  Gastrointestinal: Negative for abdominal pain, constipation, diarrhea, nausea and vomiting  Genitourinary: Positive for dysuria and frequency  Negative for decreased urine volume, difficulty urinating, flank pain, hematuria and urgency  Musculoskeletal: Negative for arthralgias and myalgias  Skin: Negative for color change, pallor and rash  Neurological: Negative for dizziness, weakness, light-headedness and headaches  Hematological: Negative for adenopathy  Psychiatric/Behavioral: Negative for agitation and confusion         Current Outpatient Medications on "File Prior to Visit   Medication Sig   • clindamycin (CLINDAGEL) 1 % gel Apply topically 2 (two) times a day   • metFORMIN (GLUCOPHAGE-XR) 500 mg 24 hr tablet TAKE 1 TABLET BY MOUTH EVERY DAY WITH DINNER   • Multiple Vitamins-Minerals (HAIR SKIN AND NAILS FORMULA PO) Take by mouth   • predniSONE 5 mg tablet Take 15 mg by mouth daily       Objective     /68 (BP Location: Left arm, Patient Position: Sitting)   Pulse 99   Temp 99 °F (37 2 °C)   Ht 5' 4\" (1 626 m)   Wt 101 kg (223 lb)   SpO2 95%   BMI 38 28 kg/m²     Physical Exam  Vitals and nursing note reviewed  Constitutional:       General: She is not in acute distress  Appearance: Normal appearance  She is not ill-appearing or diaphoretic  HENT:      Head: Normocephalic and atraumatic  Nose: No congestion or rhinorrhea  Eyes:      General: No scleral icterus  Right eye: No discharge  Left eye: No discharge  Pulmonary:      Effort: Pulmonary effort is normal  No respiratory distress  Abdominal:      General: Abdomen is flat  Palpations: Abdomen is soft  Tenderness: There is no abdominal tenderness  There is no right CVA tenderness, left CVA tenderness or guarding  Musculoskeletal:         General: Normal range of motion  Cervical back: Normal range of motion  Skin:     Coloration: Skin is not jaundiced or pale  Findings: No bruising, erythema or rash  Neurological:      General: No focal deficit present  Mental Status: She is alert and oriented to person, place, and time  Psychiatric:         Mood and Affect: Mood normal          Behavior: Behavior normal          Thought Content:  Thought content normal          Judgment: Judgment normal        SPENCER Borja  "

## 2023-04-04 NOTE — ASSESSMENT & PLAN NOTE
Acute symptomatic x 2 days frequent urination and dysuria  Denies fever  Neg bilateral CVA  Mild improvement today  In office urine dip neg  Will recommend UA, C&S, increase fluids  Call with worsening of symptoms

## 2023-04-05 LAB — BACTERIA UR CULT: NORMAL

## 2023-04-06 ENCOUNTER — TELEPHONE (OUTPATIENT)
Dept: FAMILY MEDICINE CLINIC | Facility: CLINIC | Age: 23
End: 2023-04-06

## 2023-04-06 NOTE — TELEPHONE ENCOUNTER
----- Message from [employer] [place] sent at 4/6/2023  8:15 AM EDT -----  Urine culture negative

## 2023-04-17 PROBLEM — J02.8 ACUTE PHARYNGITIS DUE TO OTHER SPECIFIED ORGANISMS: Status: RESOLVED | Noted: 2023-02-16 | Resolved: 2023-04-17

## 2023-05-02 ENCOUNTER — OFFICE VISIT (OUTPATIENT)
Dept: FAMILY MEDICINE CLINIC | Facility: CLINIC | Age: 23
End: 2023-05-02

## 2023-05-02 VITALS
HEIGHT: 64 IN | WEIGHT: 234 LBS | BODY MASS INDEX: 39.95 KG/M2 | SYSTOLIC BLOOD PRESSURE: 120 MMHG | DIASTOLIC BLOOD PRESSURE: 80 MMHG | TEMPERATURE: 98.6 F | HEART RATE: 81 BPM | OXYGEN SATURATION: 99 %

## 2023-05-02 DIAGNOSIS — R35.0 FREQUENT URINATION: Primary | ICD-10-CM

## 2023-05-02 DIAGNOSIS — N63.12 MASS OF UPPER INNER QUADRANT OF RIGHT BREAST: ICD-10-CM

## 2023-05-02 LAB
BILIRUB UR QL STRIP: NEGATIVE
CLARITY UR: CLEAR
COLOR UR: COLORLESS
GLUCOSE UR STRIP-MCNC: NEGATIVE MG/DL
HGB UR QL STRIP.AUTO: NEGATIVE
KETONES UR STRIP-MCNC: NEGATIVE MG/DL
LEUKOCYTE ESTERASE UR QL STRIP: NEGATIVE
NITRITE UR QL STRIP: NEGATIVE
PH UR STRIP.AUTO: 6.5 [PH]
PROT UR STRIP-MCNC: NEGATIVE MG/DL
SL AMB  POCT GLUCOSE, UA: ABNORMAL
SL AMB LEUKOCYTE ESTERASE,UA: ABNORMAL
SL AMB POCT BILIRUBIN,UA: ABNORMAL
SL AMB POCT BLOOD,UA: ABNORMAL
SL AMB POCT CLARITY,UA: CLEAR
SL AMB POCT COLOR,UA: ABNORMAL
SL AMB POCT KETONES,UA: ABNORMAL
SL AMB POCT NITRITE,UA: ABNORMAL
SL AMB POCT PH,UA: 5
SL AMB POCT SPECIFIC GRAVITY,UA: 1.01
SL AMB POCT URINE PROTEIN: ABNORMAL
SL AMB POCT UROBILINOGEN: 0.2
SP GR UR STRIP.AUTO: 1 (ref 1–1.03)
UROBILINOGEN UR STRIP-ACNC: <2 MG/DL

## 2023-05-02 RX ORDER — OZANIMOD HYDROCHLORIDE 0.92 MG/1
CAPSULE ORAL
COMMUNITY
Start: 2023-05-01

## 2023-05-02 NOTE — PROGRESS NOTES
Name: Rashi Meza      : 2000      MRN: [id number]  Encounter Provider: Jihan Barrera MD  Encounter Date: 2023   Encounter department: [place] [address]  Frequent urination  Assessment & Plan:  New diagnosis symptomatic has been going on for the 3 days no abdomen pain no flank pain no blood in the urine no fever urine dip in the office is negative we will send the urine for UA and CS we will follow-up accordingly    Orders:  -     Urine culture  -     UA (URINE) with reflex to Scope  -     POCT urine dip    2  Mass of upper inner quadrant of right breast  Assessment & Plan:  New diagnosis well palpable lump less than half centimeter no change in the color of the skin no pain no history of trauma recommend to do ultrasound of the right breast    Orders:  -     US breast right limited (diagnostic); Future; Expected date: 2023           Subjective      Patient here concerned about increased frequency urination has been going on for the last couple days not associated with any abdomen pain no flank pain no blood in the urine no fever denies any vaginal bleeding or discharge  Also patient concerned about a lump in the right upper breast and in the upper quarter no change in the color of the skin and no nipple discharge or bleeding nonrotated no history of trauma    Review of Systems   Constitutional: Negative for chills and fever  HENT: Negative for ear pain and sore throat  Eyes: Negative for pain and visual disturbance  Respiratory: Negative for cough and shortness of breath  Cardiovascular: Negative for chest pain and palpitations  Gastrointestinal: Negative for abdominal pain, blood in stool, constipation, diarrhea and vomiting  Genitourinary: Positive for frequency  Negative for dysuria, hematuria and vaginal discharge  Musculoskeletal: Negative for arthralgias and back pain  Skin: Negative for color change and rash  "  Neurological: Negative for seizures and syncope  All other systems reviewed and are negative  Current Outpatient Medications on File Prior to Visit   Medication Sig    Zeposia 0 92 MG CAPS     clindamycin (CLINDAGEL) 1 % gel Apply topically 2 (two) times a day    metFORMIN (GLUCOPHAGE-XR) 500 mg 24 hr tablet TAKE 1 TABLET BY MOUTH EVERY DAY WITH DINNER    Multiple Vitamins-Minerals (HAIR SKIN AND NAILS FORMULA PO) Take by mouth    [DISCONTINUED] predniSONE 5 mg tablet Take 15 mg by mouth daily       Objective     /80 (BP Location: Left arm, Patient Position: Sitting)   Pulse 81   Temp 98 6 °F (37 °C) (Tympanic)   Ht 5' 4 25\" (1 632 m)   Wt 106 kg (234 lb)   LMP 04/18/2023 (Exact Date)   SpO2 99%   Breastfeeding No   BMI 39 85 kg/m²     Physical Exam  Vitals and nursing note reviewed  Constitutional:       General: She is not in acute distress  Appearance: She is well-developed  She is not diaphoretic  HENT:      Head: Normocephalic  Right Ear: External ear normal       Left Ear: External ear normal       Nose: No congestion  Mouth/Throat:      Pharynx: No oropharyngeal exudate  Eyes:      General:         Right eye: No discharge  Left eye: No discharge  Conjunctiva/sclera: Conjunctivae normal    Neck:      Vascular: No JVD  Cardiovascular:      Rate and Rhythm: Normal rate and regular rhythm  Heart sounds: Normal heart sounds  No murmur heard  No gallop  Pulmonary:      Effort: Pulmonary effort is normal  No respiratory distress  Breath sounds: Normal breath sounds  No stridor  No wheezing or rales  Chest:      Chest wall: No tenderness  Abdominal:      General: There is no distension  Palpations: Abdomen is soft  There is no mass  Tenderness: There is no abdominal tenderness  There is no rebound  Musculoskeletal:         General: No tenderness  Cervical back: Normal range of motion and neck supple   " Lymphadenopathy:      Cervical: No cervical adenopathy  Skin:     General: Skin is warm  Findings: No erythema or rash  Neurological:      Mental Status: She is alert and oriented to person, place, and time         Carylon Siemens, MD

## 2023-05-02 NOTE — ASSESSMENT & PLAN NOTE
New diagnosis symptomatic has been going on for the 3 days no abdomen pain no flank pain no blood in the urine no fever urine dip in the office is negative we will send the urine for UA and CS we will follow-up accordingly

## 2023-05-02 NOTE — ASSESSMENT & PLAN NOTE
New diagnosis well palpable lump less than half centimeter no change in the color of the skin no pain no history of trauma recommend to do ultrasound of the right breast

## 2023-05-03 LAB — BACTERIA UR CULT: NORMAL

## 2023-05-08 ENCOUNTER — TELEPHONE (OUTPATIENT)
Dept: FAMILY MEDICINE CLINIC | Facility: CLINIC | Age: 23
End: 2023-05-08

## 2023-05-08 DIAGNOSIS — E66.09 CLASS 1 OBESITY DUE TO EXCESS CALORIES WITHOUT SERIOUS COMORBIDITY WITH BODY MASS INDEX (BMI) OF 34.0 TO 34.9 IN ADULT: Primary | ICD-10-CM

## 2023-05-08 NOTE — TELEPHONE ENCOUNTER
Patient called into the office regarding weight loss medication  Patient states that Dr Audie Ponce advised he to speak with insurance company to see which medication would be covered  Patient states she spoke with her insurance and they will cover the medication with a prior authorization   Patient would like medication sent to pharmacy

## 2023-05-10 ENCOUNTER — TELEPHONE (OUTPATIENT)
Dept: FAMILY MEDICINE CLINIC | Facility: CLINIC | Age: 23
End: 2023-05-10

## 2023-05-10 DIAGNOSIS — N63.12 MASS OF UPPER INNER QUADRANT OF RIGHT BREAST: Primary | ICD-10-CM

## 2023-05-10 NOTE — TELEPHONE ENCOUNTER
Called and spoke with patient and advised of US result  Advised patient that needs to be repeated in 3 months       Patient is scheduled for 8/17/23 at 9 am at [facility]

## 2023-05-10 NOTE — TELEPHONE ENCOUNTER
----- Message from Amanda De Souza MD sent at 5/9/2023  4:28 PM EDT -----  Possible benign lymph node recommendation to repeat US of right breast in 3 month

## 2023-05-25 ENCOUNTER — TELEPHONE (OUTPATIENT)
Dept: FAMILY MEDICINE CLINIC | Facility: CLINIC | Age: 23
End: 2023-05-25

## 2023-05-25 DIAGNOSIS — L65.9 HAIR LOSS: Primary | ICD-10-CM

## 2023-05-25 NOTE — TELEPHONE ENCOUNTER
Patient called in to advise Dr Ana Zheng that she has been noticing a significant hair loss  Patient isn't sure if it is from medications that she is on or something else is going on  Patient would like Dr Camelia Rivera opinion on what she should do

## 2023-05-25 NOTE — TELEPHONE ENCOUNTER
Per Dr Guerrero Party, Patient is being referred to dermatology  Referral placed and phone number for office provided to patient

## 2023-05-27 DIAGNOSIS — R73.01 IFG (IMPAIRED FASTING GLUCOSE): ICD-10-CM

## 2023-05-30 RX ORDER — METFORMIN HYDROCHLORIDE 500 MG/1
TABLET, EXTENDED RELEASE ORAL
Qty: 30 TABLET | Refills: 2 | Status: SHIPPED | OUTPATIENT
Start: 2023-05-30

## 2023-06-07 ENCOUNTER — OFFICE VISIT (OUTPATIENT)
Dept: FAMILY MEDICINE CLINIC | Facility: CLINIC | Age: 23
End: 2023-06-07
Payer: MEDICARE

## 2023-06-07 VITALS
WEIGHT: 241 LBS | SYSTOLIC BLOOD PRESSURE: 122 MMHG | TEMPERATURE: 99.1 F | HEIGHT: 64 IN | DIASTOLIC BLOOD PRESSURE: 80 MMHG | BODY MASS INDEX: 41.15 KG/M2 | OXYGEN SATURATION: 99 % | HEART RATE: 91 BPM

## 2023-06-07 DIAGNOSIS — E66.01 MORBID OBESITY (HCC): ICD-10-CM

## 2023-06-07 DIAGNOSIS — E66.01 MORBID OBESITY (HCC): Primary | ICD-10-CM

## 2023-06-07 PROCEDURE — 99213 OFFICE O/P EST LOW 20 MIN: CPT | Performed by: FAMILY MEDICINE

## 2023-06-07 RX ORDER — PREDNISONE 10 MG/1
TABLET ORAL
COMMUNITY
Start: 2023-05-11

## 2023-06-07 NOTE — PROGRESS NOTES
Name: Pa Martinez      : 2000      MRN: [id number]  Encounter Provider: Terrance Payton MD  Encounter Date: 2023   Encounter department: [place] [address]  Morbid obesity ([facility] )  Assessment & Plan:  Chronic uncontrolled patient has started recently on the Wegovy 0 25 mg once a week she tolerated well plan to increase Wegovy   5 mg once a week proper use and possible side effects reviewed portion control low-carb low-fat diet and increase physical activity             Subjective      Patient here for follow-up with the morbid obesity started on Wegovey 0 25 mg once a week she been tolerated well without side effect she did not lose weight but she feels she is not hungry no appetite    Review of Systems   Constitutional: Negative for chills and fever  HENT: Negative for ear pain and sore throat  Eyes: Negative for pain and visual disturbance  Respiratory: Negative for cough and shortness of breath  Cardiovascular: Negative for chest pain and palpitations  Gastrointestinal: Negative for abdominal pain, constipation, diarrhea and vomiting  Genitourinary: Negative for dysuria and hematuria  Musculoskeletal: Negative for arthralgias and back pain  Skin: Negative for color change and rash  Neurological: Negative for seizures and syncope  All other systems reviewed and are negative        Current Outpatient Medications on File Prior to Visit   Medication Sig   • clindamycin (CLINDAGEL) 1 % gel APPLY TO AFFECTED AREA TWICE A DAY   • metFORMIN (GLUCOPHAGE-XR) 500 mg 24 hr tablet TAKE 1 TABLET BY MOUTH EVERY DAY WITH DINNER   • Zeposia 0 92 MG CAPS    • Multiple Vitamins-Minerals (HAIR SKIN AND NAILS FORMULA PO) Take by mouth (Patient not taking: Reported on 2023)   • predniSONE 10 mg tablet  (Patient not taking: Reported on 2023)       Objective     /80 (BP Location: Left arm, Patient Position: Sitting)   Pulse 91   Temp "99 1 °F (37 3 °C) (Tympanic)   Ht 5' 4 25\" (1 632 m)   Wt 109 kg (241 lb)   SpO2 99%   BMI 41 05 kg/m²     Physical Exam  Vitals and nursing note reviewed  Constitutional:       General: She is not in acute distress  Appearance: She is well-developed  HENT:      Head: Normocephalic and atraumatic  Right Ear: Tympanic membrane normal  There is no impacted cerumen  Left Ear: Tympanic membrane normal  There is no impacted cerumen  Nose: No congestion  Mouth/Throat:      Pharynx: No oropharyngeal exudate  Eyes:      Conjunctiva/sclera: Conjunctivae normal    Neck:      Vascular: No JVD  Cardiovascular:      Rate and Rhythm: Normal rate and regular rhythm  Heart sounds: Normal heart sounds  No murmur heard  No friction rub  No gallop  Pulmonary:      Effort: Pulmonary effort is normal       Breath sounds: Normal breath sounds  Abdominal:      General: Bowel sounds are normal       Palpations: Abdomen is soft  Tenderness: There is no abdominal tenderness  Neurological:      Mental Status: She is oriented to person, place, and time         Francisco Martinez MD  "

## 2023-06-07 NOTE — ASSESSMENT & PLAN NOTE
Chronic uncontrolled patient has started recently on the Wegovy 0 25 mg once a week she tolerated well plan to increase Wegovy   5 mg once a week proper use and possible side effects reviewed portion control low-carb low-fat diet and increase physical activity

## 2023-06-08 ENCOUNTER — TELEPHONE (OUTPATIENT)
Dept: FAMILY MEDICINE CLINIC | Facility: CLINIC | Age: 23
End: 2023-06-08

## 2023-06-08 NOTE — TELEPHONE ENCOUNTER
Patient called in and wanted to let Dr Beau Clark know that patient has called to several pharmacies and patient is unable to find anywhere that has the wegovy medication

## 2023-06-08 NOTE — TELEPHONE ENCOUNTER
Called and spoke with patient  Advised patient to take   25 mg for another month  Patient states that no one has them in stock and she is being told they are on back order   Patient states she is able to get it from her pharmacy on 6/25

## 2023-06-26 ENCOUNTER — APPOINTMENT (OUTPATIENT)
Dept: LAB | Facility: HOSPITAL | Age: 23
End: 2023-06-26
Payer: MEDICARE

## 2023-06-26 DIAGNOSIS — K51.911 ULCERATIVE COLITIS WITH RECTAL BLEEDING, UNSPECIFIED LOCATION (HCC): ICD-10-CM

## 2023-06-26 LAB
ALBUMIN SERPL BCP-MCNC: 4 G/DL (ref 3.5–5)
ALP SERPL-CCNC: 60 U/L (ref 34–104)
ALT SERPL W P-5'-P-CCNC: 26 U/L (ref 7–52)
ANION GAP SERPL CALCULATED.3IONS-SCNC: 8 MMOL/L
AST SERPL W P-5'-P-CCNC: 17 U/L (ref 13–39)
BASOPHILS # BLD AUTO: 0.06 THOUSANDS/ÂΜL (ref 0–0.1)
BASOPHILS NFR BLD AUTO: 1 % (ref 0–1)
BILIRUB SERPL-MCNC: 0.35 MG/DL (ref 0.2–1)
BUN SERPL-MCNC: 24 MG/DL (ref 5–25)
CALCIUM SERPL-MCNC: 9.4 MG/DL (ref 8.4–10.2)
CHLORIDE SERPL-SCNC: 104 MMOL/L (ref 96–108)
CO2 SERPL-SCNC: 27 MMOL/L (ref 21–32)
CREAT SERPL-MCNC: 0.81 MG/DL (ref 0.6–1.3)
CRP SERPL QL: 14.5 MG/L
EOSINOPHIL # BLD AUTO: 0.47 THOUSAND/ÂΜL (ref 0–0.61)
EOSINOPHIL NFR BLD AUTO: 7 % (ref 0–6)
ERYTHROCYTE [DISTWIDTH] IN BLOOD BY AUTOMATED COUNT: 12 % (ref 11.6–15.1)
GFR SERPL CREATININE-BSD FRML MDRD: 101 ML/MIN/1.73SQ M
GLUCOSE P FAST SERPL-MCNC: 91 MG/DL (ref 65–99)
HCT VFR BLD AUTO: 43.8 % (ref 34.8–46.1)
HGB BLD-MCNC: 13.6 G/DL (ref 11.5–15.4)
IMM GRANULOCYTES # BLD AUTO: 0.05 THOUSAND/UL (ref 0–0.2)
IMM GRANULOCYTES NFR BLD AUTO: 1 % (ref 0–2)
LYMPHOCYTES # BLD AUTO: 0.64 THOUSANDS/ÂΜL (ref 0.6–4.47)
LYMPHOCYTES NFR BLD AUTO: 9 % (ref 14–44)
MCH RBC QN AUTO: 29.1 PG (ref 26.8–34.3)
MCHC RBC AUTO-ENTMCNC: 31.1 G/DL (ref 31.4–37.4)
MCV RBC AUTO: 94 FL (ref 82–98)
MONOCYTES # BLD AUTO: 0.68 THOUSAND/ÂΜL (ref 0.17–1.22)
MONOCYTES NFR BLD AUTO: 10 % (ref 4–12)
NEUTROPHILS # BLD AUTO: 5.22 THOUSANDS/ÂΜL (ref 1.85–7.62)
NEUTS SEG NFR BLD AUTO: 72 % (ref 43–75)
NRBC BLD AUTO-RTO: 0 /100 WBCS
PLATELET # BLD AUTO: 340 THOUSANDS/UL (ref 149–390)
PMV BLD AUTO: 9.6 FL (ref 8.9–12.7)
POTASSIUM SERPL-SCNC: 4.1 MMOL/L (ref 3.5–5.3)
PROT SERPL-MCNC: 7.1 G/DL (ref 6.4–8.4)
RBC # BLD AUTO: 4.68 MILLION/UL (ref 3.81–5.12)
SODIUM SERPL-SCNC: 139 MMOL/L (ref 135–147)
WBC # BLD AUTO: 7.12 THOUSAND/UL (ref 4.31–10.16)

## 2023-06-26 PROCEDURE — 85025 COMPLETE CBC W/AUTO DIFF WBC: CPT

## 2023-06-26 PROCEDURE — 84630 ASSAY OF ZINC: CPT

## 2023-06-26 PROCEDURE — 86140 C-REACTIVE PROTEIN: CPT

## 2023-06-26 PROCEDURE — 36415 COLL VENOUS BLD VENIPUNCTURE: CPT

## 2023-06-26 PROCEDURE — 80053 COMPREHEN METABOLIC PANEL: CPT

## 2023-06-28 LAB — ZINC SERPL-MCNC: 66 UG/DL (ref 44–115)

## 2023-07-03 DIAGNOSIS — R05.9 COUGH, UNSPECIFIED TYPE: Primary | ICD-10-CM

## 2023-07-03 RX ORDER — BENZONATATE 100 MG/1
100 CAPSULE ORAL 3 TIMES DAILY PRN
Qty: 20 CAPSULE | Refills: 0 | Status: SHIPPED | OUTPATIENT
Start: 2023-07-03

## 2023-07-03 NOTE — TELEPHONE ENCOUNTER
Patient has had an ongoing productive cough for 2 weeks. It has started to hurt her abdomen when she coughs. She wanted to ask if you have any recommendations or can prescribe anything.

## 2023-07-12 ENCOUNTER — OFFICE VISIT (OUTPATIENT)
Dept: FAMILY MEDICINE CLINIC | Facility: CLINIC | Age: 23
End: 2023-07-12
Payer: MEDICARE

## 2023-07-12 VITALS
OXYGEN SATURATION: 99 % | HEART RATE: 89 BPM | SYSTOLIC BLOOD PRESSURE: 120 MMHG | WEIGHT: 244.8 LBS | TEMPERATURE: 96.5 F | DIASTOLIC BLOOD PRESSURE: 80 MMHG | BODY MASS INDEX: 41.79 KG/M2 | HEIGHT: 64 IN

## 2023-07-12 DIAGNOSIS — E66.01 MORBID OBESITY (HCC): ICD-10-CM

## 2023-07-12 DIAGNOSIS — K50.10 CROHN'S DISEASE OF LARGE INTESTINE WITHOUT COMPLICATION (HCC): Primary | ICD-10-CM

## 2023-07-12 DIAGNOSIS — K92.1 BLOOD IN STOOL: ICD-10-CM

## 2023-07-12 PROCEDURE — 99214 OFFICE O/P EST MOD 30 MIN: CPT | Performed by: FAMILY MEDICINE

## 2023-07-12 NOTE — PROGRESS NOTES
Name: Yenifer Bull      : 2000      MRN: [id number]  Encounter Provider: Nima Jennings MD  Encounter Date: 2023   Encounter department: [address] [address]     1. Crohn's disease of large intestine without complication ([address] [facility])  Assessment & Plan:  Acute exacerbation she is currently on prednisone tapering dose continue recommend to well referral to GI specialist with inflammatory bowel disease recommend well hydration in case symptoms get worse or positive for fever go to ER    Orders:  -     Ambulatory Referral to Gastroenterology; Future  -     CBC and differential; Future  -     Ferritin; Future  -     Iron; Future  -     Iron Saturation %; Future    2. Blood in stool  Assessment & Plan:  Acute exacerbation she is currently on prednisone tapering dose continue recommend to well referral to GI specialist with inflammatory bowel disease recommend well hydration in case symptoms get worse or positive for fever go to ER    Orders:  -     CBC and differential; Future  -     Ferritin; Future  -     Iron; Future  -     Iron Saturation %; Future    3. Morbid obesity ([address] [facility])  Assessment & Plan:  Secondary to shortage" V she will continue on her current dose of 0.25 mg injection weekly    Orders:  -     CBC and differential; Future  -     Ferritin; Future  -     Iron; Future  -     Iron Saturation %; Future           Subjective      Patient having up in her Crohn disease she been started on the new medication and she has not been tolerated including frequency urination dizziness blurred vision. No headache, no difficulty swallowing patient been having diarrhea been having blood in the stool she been going to the bathroom more than 5-6 times for bowel movement no fever contact GI no appointment until september    Review of Systems   Constitutional: Negative for chills and fever. HENT: Negative for ear pain and sore throat.     Eyes: Negative for pain and visual disturbance. Respiratory: Negative for cough and shortness of breath. Cardiovascular: Negative for chest pain and palpitations. Gastrointestinal: Positive for blood in stool and diarrhea. Negative for abdominal pain and vomiting. Genitourinary: Negative for dysuria and hematuria. Musculoskeletal: Negative for arthralgias and back pain. Skin: Negative for color change and rash. Neurological: Negative for seizures and syncope. All other systems reviewed and are negative. Current Outpatient Medications on File Prior to Visit   Medication Sig   • benzonatate (TESSALON PERLES) 100 mg capsule Take 1 capsule (100 mg total) by mouth 3 (three) times a day as needed for cough   • clindamycin (CLINDAGEL) 1 % gel APPLY TO AFFECTED AREA TWICE A DAY   • metFORMIN (GLUCOPHAGE-XR) 500 mg 24 hr tablet TAKE 1 TABLET BY MOUTH EVERY DAY WITH DINNER   • Multiple Vitamins-Minerals (HAIR SKIN AND NAILS FORMULA PO) Take by mouth   • predniSONE 10 mg tablet    • Wegovy 0.25 MG/0.5ML INJECT 0.5 ML (0.25 MG TOTAL) UNDER THE SKIN ONCE A WEEK FOR 4 DOSES (Patient not taking: Reported on 7/12/2023)   • Zeposia 0.92 MG CAPS  (Patient not taking: Reported on 7/12/2023)       Objective     /80 (BP Location: Left arm, Patient Position: Sitting)   Pulse 89   Temp (!) 96.5 °F (35.8 °C) (Tympanic)   Ht 5' 4.24" (1.632 m)   Wt 111 kg (244 lb 12.8 oz)   SpO2 99%   BMI 41.71 kg/m²     Physical Exam  Vitals and nursing note reviewed. Constitutional:       General: She is not in acute distress. Appearance: She is well-developed. She is not diaphoretic. HENT:      Head: Normocephalic. Right Ear: External ear normal.      Left Ear: External ear normal.      Nose: Rhinorrhea present. Mouth/Throat:      Pharynx: No posterior oropharyngeal erythema. Eyes:      General:         Right eye: No discharge. Left eye: No discharge. Conjunctiva/sclera: Conjunctivae normal.   Neck:      Vascular: No JVD. Cardiovascular:      Rate and Rhythm: Normal rate and regular rhythm. Heart sounds: Normal heart sounds. No murmur heard. No gallop. Pulmonary:      Effort: Pulmonary effort is normal. No respiratory distress. Breath sounds: Normal breath sounds. No stridor. No wheezing or rales. Chest:      Chest wall: No tenderness. Abdominal:      General: There is no distension. Palpations: Abdomen is soft. There is no mass. Tenderness: There is no abdominal tenderness. There is no rebound. Musculoskeletal:         General: No tenderness. Cervical back: Normal range of motion and neck supple. Lymphadenopathy:      Cervical: No cervical adenopathy. Skin:     General: Skin is warm. Findings: No erythema or rash. Neurological:      Mental Status: She is alert and oriented to person, place, and time.        Sydnee Ferris MD

## 2023-07-13 ENCOUNTER — TELEPHONE (OUTPATIENT)
Dept: FAMILY MEDICINE CLINIC | Facility: CLINIC | Age: 23
End: 2023-07-13

## 2023-07-13 NOTE — TELEPHONE ENCOUNTER
Patient calling in to see if with all of her health conditions if it would be safe for patient to start taking saw palmetto which is a plant based vitamin. Patient states that she has done some research and it is good for helping with urinary problems and hair loss . Please advise.

## 2023-07-15 NOTE — ASSESSMENT & PLAN NOTE
Acute exacerbation she is currently on prednisone tapering dose continue recommend to well referral to GI specialist with inflammatory bowel disease recommend well hydration in case symptoms get worse or positive for fever go to ER

## 2023-08-01 ENCOUNTER — TELEPHONE (OUTPATIENT)
Dept: FAMILY MEDICINE CLINIC | Facility: CLINIC | Age: 23
End: 2023-08-01

## 2023-08-01 NOTE — TELEPHONE ENCOUNTER
Patient called in to advise Dr. Deyvi Caballero that she is having a crohn's flair up and she is taking predisone. Patient states that she is still fairing even being on the medication and she is loosing a lot of blood. Patient states that she is having a lot of anxiety when leaving the house since she is afraid that she is going to have an accident. Patient states she experiences dizziness, chest pain, throat tightens and patient feels dizzy. Patient would like to know what your recommendation is.

## 2023-08-01 NOTE — TELEPHONE ENCOUNTER
Advised patient that per Dr. Willian Salgado patient is to tgo to ER. Patient states she was there yesterday and they didn't do anything. Patient is working with GI for the crohn's disease but she is looking for something to help her with the anxiety. Patient is asking if there is something that is over the counter that she would be able to take to calm down the anxiety.

## 2023-08-02 NOTE — TELEPHONE ENCOUNTER
Advised patient of message from Dr. Elias Mckeon that if patient would like medication for anxiety she needs an appointment to discuss.

## 2023-08-07 ENCOUNTER — APPOINTMENT (OUTPATIENT)
Dept: LAB | Facility: HOSPITAL | Age: 23
End: 2023-08-07
Payer: MEDICARE

## 2023-08-07 DIAGNOSIS — E66.01 MORBID OBESITY (HCC): ICD-10-CM

## 2023-08-07 DIAGNOSIS — K51.911 ULCERATIVE COLITIS WITH RECTAL BLEEDING, UNSPECIFIED LOCATION (HCC): ICD-10-CM

## 2023-08-07 DIAGNOSIS — K92.1 BLOOD IN STOOL: ICD-10-CM

## 2023-08-07 DIAGNOSIS — K50.10 CROHN'S DISEASE OF LARGE INTESTINE WITHOUT COMPLICATION (HCC): Primary | ICD-10-CM

## 2023-08-07 DIAGNOSIS — K50.10 CROHN'S DISEASE OF LARGE INTESTINE WITHOUT COMPLICATION (HCC): ICD-10-CM

## 2023-08-07 LAB
BASOPHILS # BLD AUTO: 0.09 THOUSANDS/ÂΜL (ref 0–0.1)
BASOPHILS NFR BLD AUTO: 1 % (ref 0–1)
EOSINOPHIL # BLD AUTO: 0.61 THOUSAND/ÂΜL (ref 0–0.61)
EOSINOPHIL NFR BLD AUTO: 7 % (ref 0–6)
ERYTHROCYTE [DISTWIDTH] IN BLOOD BY AUTOMATED COUNT: 13.5 % (ref 11.6–15.1)
FERRITIN SERPL-MCNC: 27 NG/ML (ref 11–307)
HCT VFR BLD AUTO: 43.8 % (ref 34.8–46.1)
HGB BLD-MCNC: 13.7 G/DL (ref 11.5–15.4)
IMM GRANULOCYTES # BLD AUTO: 0.07 THOUSAND/UL (ref 0–0.2)
IMM GRANULOCYTES NFR BLD AUTO: 1 % (ref 0–2)
IRON SATN MFR SERPL: 19 % (ref 15–50)
IRON SERPL-MCNC: 64 UG/DL (ref 50–170)
LYMPHOCYTES # BLD AUTO: 1.33 THOUSANDS/ÂΜL (ref 0.6–4.47)
LYMPHOCYTES NFR BLD AUTO: 15 % (ref 14–44)
MCH RBC QN AUTO: 29.2 PG (ref 26.8–34.3)
MCHC RBC AUTO-ENTMCNC: 31.3 G/DL (ref 31.4–37.4)
MCV RBC AUTO: 93 FL (ref 82–98)
MONOCYTES # BLD AUTO: 1.07 THOUSAND/ÂΜL (ref 0.17–1.22)
MONOCYTES NFR BLD AUTO: 12 % (ref 4–12)
NEUTROPHILS # BLD AUTO: 5.55 THOUSANDS/ÂΜL (ref 1.85–7.62)
NEUTS SEG NFR BLD AUTO: 64 % (ref 43–75)
NRBC BLD AUTO-RTO: 0 /100 WBCS
PLATELET # BLD AUTO: 353 THOUSANDS/UL (ref 149–390)
PMV BLD AUTO: 9.4 FL (ref 8.9–12.7)
RBC # BLD AUTO: 4.69 MILLION/UL (ref 3.81–5.12)
TIBC SERPL-MCNC: 330 UG/DL (ref 250–450)
WBC # BLD AUTO: 8.72 THOUSAND/UL (ref 4.31–10.16)

## 2023-08-07 PROCEDURE — 85025 COMPLETE CBC W/AUTO DIFF WBC: CPT

## 2023-08-07 PROCEDURE — 83550 IRON BINDING TEST: CPT

## 2023-08-07 PROCEDURE — 87505 NFCT AGENT DETECTION GI: CPT

## 2023-08-07 PROCEDURE — 83540 ASSAY OF IRON: CPT

## 2023-08-07 PROCEDURE — 87209 SMEAR COMPLEX STAIN: CPT

## 2023-08-07 PROCEDURE — 87177 OVA AND PARASITES SMEARS: CPT

## 2023-08-07 PROCEDURE — 82728 ASSAY OF FERRITIN: CPT

## 2023-08-07 PROCEDURE — 36415 COLL VENOUS BLD VENIPUNCTURE: CPT

## 2023-08-08 LAB
C DIFF TOX GENS STL QL NAA+PROBE: NEGATIVE
CAMPYLOBACTER DNA SPEC NAA+PROBE: NORMAL
SALMONELLA DNA SPEC QL NAA+PROBE: NORMAL
SHIGA TOXIN STX GENE SPEC NAA+PROBE: NORMAL
SHIGELLA DNA SPEC QL NAA+PROBE: NORMAL

## 2023-08-20 ENCOUNTER — TELEPHONE (OUTPATIENT)
Dept: OTHER | Facility: OTHER | Age: 23
End: 2023-08-20

## 2023-08-20 NOTE — TELEPHONE ENCOUNTER
Call from patient requesting call back to discuss the status of her [facility] script. She advised it has been out of stock for a few months. Please return her call.

## 2023-08-21 ENCOUNTER — TELEPHONE (OUTPATIENT)
Dept: FAMILY MEDICINE CLINIC | Facility: CLINIC | Age: 23
End: 2023-08-21

## 2023-08-21 DIAGNOSIS — E66.01 MORBID OBESITY (HCC): Primary | ICD-10-CM

## 2023-08-21 NOTE — TELEPHONE ENCOUNTER
Per dr Gladys Morillo spoke with patient and doctor-to increase wegovy to 0.5mg sent to pharmacy and patient aware

## 2023-08-21 NOTE — TELEPHONE ENCOUNTER
Patient want to know what going on with the wegovy. Hasnt had it since last refill on 6-24-23. You didn't fill it on her apt on 7-12-23. Wants to know if she should still be taking it and if so what dose.  Please advise

## 2023-08-28 ENCOUNTER — TELEPHONE (OUTPATIENT)
Dept: FAMILY MEDICINE CLINIC | Facility: CLINIC | Age: 23
End: 2023-08-28

## 2023-08-28 NOTE — TELEPHONE ENCOUNTER
Patient has been waking up feeling nauseous for the past few weeks . She wants to know if it could be a side effect of a medication and what she could do.

## 2023-09-08 ENCOUNTER — CONSULT (OUTPATIENT)
Dept: GASTROENTEROLOGY | Facility: CLINIC | Age: 23
End: 2023-09-08
Payer: MEDICARE

## 2023-09-08 VITALS
OXYGEN SATURATION: 98 % | DIASTOLIC BLOOD PRESSURE: 68 MMHG | BODY MASS INDEX: 41.66 KG/M2 | SYSTOLIC BLOOD PRESSURE: 118 MMHG | HEART RATE: 90 BPM | RESPIRATION RATE: 18 BRPM | HEIGHT: 64 IN | WEIGHT: 244 LBS

## 2023-09-08 DIAGNOSIS — K50.10 CROHN'S DISEASE OF LARGE INTESTINE WITHOUT COMPLICATION (HCC): ICD-10-CM

## 2023-09-08 DIAGNOSIS — K51.319 ULCERATIVE RECTOSIGMOIDITIS WITH COMPLICATION (HCC): Primary | ICD-10-CM

## 2023-09-08 PROCEDURE — 99244 OFF/OP CNSLTJ NEW/EST MOD 40: CPT | Performed by: INTERNAL MEDICINE

## 2023-09-08 NOTE — PROGRESS NOTES
2/7/23 colonoscopy  Digital rectal examination showed medium sized internal hemorrhoids. Mild erythema and distortion of normal vasculature in rectum without ulceration and without friability (mcintosh endoscopic subscore 1). The remainder of the colon and terminal ileum appeared normal. Biopsies taken. Prep was fair. A. TERMINAL ILEUM, BIOPSY:   Ileal mucosa with no active inflammation seen. Negative for dysplasia. B. ASCENDING COLON, BIOPSY:   Focal minimal active colitis. Negative for dysplasia. C. TRANSVERSE, BIOPSY:   Colonic mucosa with no active colitis seen. Negative for dysplasia:   D. LEFT COLON, BIOPSY:   Colonic mucosa with no active colitis seen. Negative for dysplasia. E. RECTUM COLON, BIOPSY:   Active chronic colitis, moderately severe. Negative for dysplasia.      7/24/23 CT  Mild distal colonic wall thickening     C diff - 8/7/23; positive in 2921

## 2023-09-08 NOTE — Clinical Note
Hello, she just established care with us. She has already failed Remicade, Humira, Stelara, and ozanimod. I would like her to start Rinvoq next.   Thank you

## 2023-09-11 NOTE — PROGRESS NOTES
Gastroenterology Outpatient Follow-up - Crohn's Disease  Iam Wallace 23 y.o. female MRN: [id number]  Encounter: [id number]    Iam Wallace is a 23 y.o. female with Ulcerative colitis.     Referred by Dr. Elizabeth Conteh    Symptom onset:     Diagnosis:  ulcerative colitis  Year of diagnosis:  2014    IBD Summary:      Ulcerative Colitis Summary  Macroscopic extent of diseases: ulcerative proctitis  Microscopic extent of diseases:  pancolitis  Has the patient ever been hospitalized for severe disease: Yes        Surgical History  Number of IBD surgeries: 0      First IBD surgery:    Most Recent IBD surgery:    Esophageal:  0    Gastroduodenal:  0    Small bowel resection(s):  0    Ileocolonic resection(s): 0      Colonic resection(s):  0    Ileostomy or colostomy:  no previous ostomy    Complete colectomy:  No         Medications     Year last used Reason for discontinuation   Corticosteroids prior   2023 ID     Thiopurines   never         Methotrexate   never         Infliximab prior   2015 AE infusion reation   Adalimumab prior   2017 JENNY anti-ADA   Certolizumab   never         Golimumab   never         Natalizumab   never         Mesalamine   never         Sulfasalzine   never         Vedolizumab   never         Ustekinumab prior   2023 JENNY     Tofacitinib   never         Other biologic prior   2023 AE Ozanimod - blurry vision and light sensitivity       Extraintestinal Manifestations    IBD-associated arthropathy Yes    Uveitis No    Oral aphthous ulcers No   Erythema nodosum No    Pyoderma gangrenosum No    Primary sclerosing cholangitis No    Thrombotic complications No          Cancer / Dysplasia History  History of IBD-associated dysplasia: none    Date of diagnosis (Year):     History of colorectal cancer: No   History of cervical dysplasia: No   History of skin cancer: none         Laboratory Data   Most recent (date) Result   PPD   unknown   Quanitferon gold 2/7/2020 negative   TPMT   unknown   Hepatitis A 2/7/2020 positive   HBsAb 2/7/2020 negative   HBcAb 2/7/2020 negative   HBsAg 2/7/2020 negative   HCV Ab 2/7/2020 negative       Imaging / Diagnostic Procedures   Most recent (date) Findings   Colonoscopy or sigmoidoscopy #1 2/7/2023            Ulcers/Erosions  no erosions           Strictures  none           Stricture Severity  N/A           Endoscopic Score Vargas Score:    Rutgeert's:  N/A            Findings  Digital rectal examination showed medium sized internal hemorrhoids. Mild erythema and distortion of normal vasculature in rectum without ulceration and without friability (vargas endoscopic subscore 1). The remainder of the colon and terminal ileum appeared normal. Biopsies taken. Prep was fair. Pathology  A. TERMINAL ILEUM, BIOPSY:   Ileal mucosa with no active inflammation seen. Negative for dysplasia. B. ASCENDING COLON, BIOPSY:   Focal minimal active colitis. Negative for dysplasia. C. TRANSVERSE, BIOPSY:   Colonic mucosa with no active colitis seen. Negative for dysplasia:   D. LEFT COLON, BIOPSY:   Colonic mucosa with no active colitis seen. Negative for dysplasia. E. RECTUM COLON, BIOPSY:   Active chronic colitis, moderately severe. Negative for dysplasia. Colonoscopy or sigmoidoscopy #2              Ulcers/Erosions                 Strictures                 Stricture Severity              Endoscopic Score Vargas Score:    Rugeert's:              Findings              Pathology      EGD       Small bowel follow-through       CT enterography       CT without enterography 7/24/2023 Mild distal colonic wall thickening. MRI enterography       Capsule study       DEXA scan   Lowest Z-score:      CXR (for TB)           HPI:   Interval History:  9/8/2023 Initial visit  Nate Coronado is establishing care with me for left-sided ulcerative colitis. Has been managed at [facility]. She was diagnosed in 2014 after she started having rectal bleeding, diarrhea, and abdominal cramping.   After a month of the symptoms, she became lightheaded and weak and had to be hospitalized. She also lost a lot of weight. She required a blood transfusion in the hospital and had a colonoscopy. She cannot recall whether she was diagnosed with ulcerative colitis or Crohn's disease, but it sounds like left-sided UC based on available records. She is quite certain that she never had small bowel involvement. She was initially treated with IV steroids, then infliximab was started in 2014. She did well with infliximab, but unfortunately had a severe infusion reaction with hives and respiratory distress after less than a year. She then went back on prednisone, then transition to adalimumab. Adalimumab worked for about 18 months, then she lost response around 2017 and was told that she had developed antiadalimumab antibodies. She again was put on steroids, then ustekinumab was started in 2019. She did very well with ustekinumab, but fortunately flared up again and colonoscopy in 2/2023 showed Vargas 1 disease in the rectum. At that time, she was already on every 4 week dosing of ustekinumab. Therefore, she was put back on steroids and started ozanimod in April 2023. After 10 weeks of ozanimod, she developed blurry vision and light sensitivity, so the treatment was interrupted in May. She has not yet seen ophthalmology, but fortunately her vision is improved. In addition, she felt that ozanimod was not able to get off steroids. She has now been on prednisone for the past 7 months. Current prednisone dose is 10 mg. She has gained 20 pounds since February from being on steroids. She is also having joint pain. She also has diabetes and has been on metformin for the past 2 years. She started [facility], but it is no longer available. Has chronic nausea. Currently having watery diarrhea with bleeding and urgency. History of C. difficile in 2021, negative test on 8/7/2023.   Hasn't been able to work this year because of her IBD. Non smoker. No FH of IBD. She may have HS - describes pustular lesions in axillae and groin - and saw Derm a few years ago; will be seeing Derm in October again for hair loss. Last pap smear was a year ago. Has had 1 Shingrix vaccine. No flu shto this year. No COVID booster. No recent pneumonia vaccines. Infectious studies from 8/7/2023 were negative. Adi Quintanilla reports the following symptoms over the last 7 days:    Stool Frequency 3-4 stools/day more than normal   Average stools per day: 5   Average liquid stools per day: 5   Consistency of bowel: mostly or all liquid   Awakening from sleep to move bowels? Yes   Urgency severe fecal urgency   Visible blood in stool? visible blood in stool half of the time or more   Abdominal pain? severe   General Wellbeing? poor     Adi Quintanilla also reports the following symptoms in the last month:    Leakage of stool while sleeping? Yes   Leakage of stool while awake?  Yes       REVIEW OF SYSTEMS:  During the last 7 days, Adi Quintanilla experienced the following symptoms:  Unintentional Weight Loss (in the last month) No   Fever No   Eye irritation No   Mouth sores No   Sore throat No   Chest pain No   Shortness of breath No   Numbness or tingling in hands or feet No   Skin rash No   Pain or swelling in joints Yes   Bruising or bleeding No   Felt depressed or blue No   Fatigue Yes   Dysuria No   Please see HPI for additional pertinent review of systems; otherwise remainder of ROS was unremarkable    MEDICATIONS:    Current Outpatient Medications:   •  clindamycin (CLINDAGEL) 1 % gel  •  metFORMIN (GLUCOPHAGE-XR) 500 mg 24 hr tablet  •  Multiple Vitamins-Minerals (HAIR SKIN AND NAILS FORMULA PO)  •  predniSONE 10 mg tablet    ALLERGIES:  Allergies   Allergen Reactions   • Infliximab Hives     Difficulty breathing as well        OBJECTIVE:  /68   Pulse 90   Resp 18   Ht 5' 4.24" (1.632 m)   Wt 111 kg (244 lb)   SpO2 98%   BMI 41.57 kg/m²      PHYSICAL EXAM: General Appearance:   Alert, cooperative, no distress   HEENT:   Normocephalic, atraumatic, anicteric. Neck:  Supple, symmetrical, trachea midline   Lungs:   Clear to auscultation bilaterally; no rales, rhonchi or wheezing; respirations unlabored    Heart[de-identified]   Regular rate and rhythm; no murmur, rub, or gallop. Abdomen:   Soft, non-distended; normal bowel sounds    Abdominal exam was notable for mild tenderness with not assessed mass. Comment: diffuse ttp   Genitalia:   Deferred    Rectal:   Perirectal assessment was not performed. Extremities:  No cyanosis, clubbing or edema    Pulses:  2+ and symmetric    Skin:  No jaundice, rashes, or lesions    Lymph nodes:  No palpable cervical lymphadenopathy        Lab Results   Component Value Date    WBC 8.72 08/07/2023    HGB 13.7 08/07/2023    HCT 43.8 08/07/2023    MCV 93 08/07/2023     08/07/2023     Lab Results   Component Value Date    SODIUM 139 06/26/2023    K 4.1 06/26/2023     06/26/2023    CO2 27 06/26/2023    AGAP 8 06/26/2023    BUN 24 06/26/2023    CREATININE 0.81 06/26/2023    GLUC 102 (H) 10/19/2022    GLUF 91 06/26/2023    CALCIUM 9.4 06/26/2023    AST 17 06/26/2023    ALT 26 06/26/2023    ALKPHOS 60 06/26/2023    TP 7.1 06/26/2023    TBILI 0.35 06/26/2023    EGFR 101 06/26/2023     Lab Results   Component Value Date    CRP 14.5 (H) 06/26/2023     No results found for: "[id number]"  Lab Results   Component Value Date    FERRITIN 27 08/07/2023       ASSESSMENT AND PLAN:    Aman Dao has a history of macroscopic proctitis and microscopic pancolitis  ulcerative colitis diagnosed in 2014. Current medical therapy is with prednisone. My global assessment is that the clinical disease is currently moderately active. The 6-point Vargas score was 4 and the 9-point Vargas score was 6. The short CDAI was 317.       Aman Dao has history of left-sided ulcerative colitis, although her most recent colonoscopy showed microscopic evidence of disease in the right colon. She has had significant exposure to steroids and has also been on a number of medications including infliximab (adverse events), adalimumab (loss of response), ustekinumab (loss of response), and ozanimod (adverse event and primary nonresponse). She is currently on steroids and has been on prednisone for several months with significant weight gain. She also has diabetes. Her disease remains active clinically. Her last colonoscopy was in February with cryptitis, but there was also inflammation on biopsies from the ascending colon. We had a long discussion about future treatment options. It is very important that we start treatment ASAP and get her off steroids. Remaining options include vedolizumab and Catarino inhibitors (tofacitinib and upadacitinib). We discussed both of these at length. An advantage of vedolizumab is that it has a better safety profile and is safe in pregnancy. A disadvantage is that it may have a slow onset of action and she has already been on steroids for 7 months. Advantages of Catarino inhibitors are that they tend to work fast; disadvantages are risks of cardiovascular and thrombotic events in older patients taking high-dose tofacitinib, shingles, increased risk of cervical and skin cancer, and lack of safety data regarding pregnancy. Charley Carter has no plans to become pregnant in the next several years. She also wishes to get off steroids as soon as possible and would like a medication that has the potential to work quickly. She would like to try upadacitinib, which is a more selective Catarino inhibitor and might have a better safety profile compared to tofacitinib, although we do not know if this for sure. 1.  We will try to get insurance approval for upadacitinib. 2.  I want her to get the second Shingrix vaccine. 3.  Update QuantiFERON gold and hepatitis serologies. 4.  Check CBC, CMP, CRP. 5.  Check iron stores and vitamin D level.   6.  She should have annual Pap smear and dermatology exams, especially if she starts upadacitinib. Return in 3 months. Health Maintenance Recommendations:  Vaccines & Infections  · COVID-19 vaccination and boosters are recommended. There is no evidence that the COVID-19 vaccine would cause an IBD flare. · Avoid live vaccines if on immunosuppressive therapy. · Yearly influenza vaccine (flu shot). · Pneumonia vaccines for patients on immunosuppression. These include Prevnar 20, followed by Pneumovax 23 at least 8 weeks later. · Shingrix vaccine (series of 2 injections) for al patients 72 and older. Patients on tofacitinib or upadacitinib should be vaccinated regardless of age. · If not immune to measles mumps or rubella, MMR vaccine is recommended. However, this is a live vaccine and should be given prior to immunosuppressive therapy. · HPV vaccination as per national guidelines. · Hepatitis A and B vaccinations if not previously vaccinated. · Testing for tuberculosis with QuantiFERON Gold blood test and/or chest xray prior to starting immunosuppressive medications, and then annually    Cancer screening  · Dysplasia surveillance for colorectal cancer. Colonoscopy in all patients with extensive colitis (more than 1/3 of the colon involved) who had disease for at least 8 or more years. Repeat colonoscopy approximately every 12-24 months. In patients with concurrent primary sclerosing cholangitis, history of dysplasia, or family history of colon cancer, repeat colonoscopy annually. · Females: Pap smear annually for woman on immunosuppression. · Annual dermatologic/skin exam in all patients with IBD, especially those on immunosuppression with thiopurines or GERARDO inhibitors.     Miscellaneous  · DEXA scan, once off steroids for 3 months  · Depression screening recommended annually  · Routine dental and ophthalmology examinations    Problem List Items Addressed This Visit        Digestive    Crohn's disease of large intestine without complication ([address])   Other Visit Diagnoses     Ulcerative rectosigmoiditis with complication ([address])    -  Primary    Relevant Orders    CBC and Platelet    Comprehensive metabolic panel    C-reactive protein    Ferritin    Vitamin D 25 hydroxy    TIBC Panel (incl.  Iron, TIBC, % Iron Saturation)    Hepatitis A antibody, total    Hepatitis B core antibody, total    Hepatitis B surface antigen    Hepatitis B surface antibody    Hepatitis C antibody    Quantiferon TB Gold Plus          Aly Gamble MD

## 2023-09-12 ENCOUNTER — TELEPHONE (OUTPATIENT)
Dept: GASTROENTEROLOGY | Facility: CLINIC | Age: 23
End: 2023-09-12

## 2023-09-12 DIAGNOSIS — K51.319 ULCERATIVE RECTOSIGMOIDITIS WITH COMPLICATION (HCC): Primary | ICD-10-CM

## 2023-09-12 NOTE — TELEPHONE ENCOUNTER
----- Message from Rudolph Harris MD sent at 9/11/2023  5:46 PM EDT -----  Hello, she just established care with us. She has already failed Remicade, Humira, Stelara, and ozanimod. I would like her to start Rinvoq next.   Thank you

## 2023-09-12 NOTE — TELEPHONE ENCOUNTER
Would you please confirm this dosage is correct:  Rinvoq 45mg, take one tablet daily for 12 weeks then,  Rinvoq 15mg, take one tablet daily thereafter  Thank you!

## 2023-09-15 ENCOUNTER — TELEPHONE (OUTPATIENT)
Age: 23
End: 2023-09-15

## 2023-09-15 NOTE — TELEPHONE ENCOUNTER
Patients GI provider:  Dr. Katheryn Lindsey    Number to return call: [phone]    Reason for call: Pt calling questioning if she would need a letter from our office to take to pharmacy to obtain 2nd dose of shingles vaccination. Please contact patient to further discuss.

## 2023-09-15 NOTE — LETTER
September 19, 2023     Waldo Thomas    Patient: Waldo Thomas   YOB: 2000       To Whom This May Concern:    Waldo Thomas is under my care for severe ulcerative colitis. She has been on several immunosuppressive medications. She will be starting therapy with upadacitinib. Upadacitinib increases the risks for certain  infections including shingles (herpes zoster virus reactivation). This was noted during the clinical trials for upadacitinib, as well as for tofacitinib, another Catarino inhibitor. The FDA has issued a black box warning regarding this adverse event. I recommend that Waldo Thomas receive 2 doses of the Shingrix vaccine to decrease the risk of developing shingles while on upadacitinib. This is a nonlive vaccine and can be administered either before or after starting upadacitinib. Please let me know if you have any questions.     Sincerely,    Katlyn Gutierrez MD/PhD

## 2023-09-15 NOTE — TELEPHONE ENCOUNTER
Patients GI provider:  Dr. Cherelle Jaimes    Number to return call: [phone]    Reason for call: Pt calling for status update for medication. Please contact patient with update.     Scheduled procedure/appointment date if applicable: 11/62/7622

## 2023-09-19 ENCOUNTER — PATIENT MESSAGE (OUTPATIENT)
Dept: GASTROENTEROLOGY | Facility: CLINIC | Age: 23
End: 2023-09-19

## 2023-09-19 NOTE — TELEPHONE ENCOUNTER
9/19 AUTH STARTED ON CMM, AWAITING NEXT SET OF QUESTIONS    LINWOOD ERVIN (Trevizo: [id number])  Rinvoq 45MG er tablets

## 2023-09-20 RX ORDER — UPADACITINIB 45 MG/1
45 TABLET, EXTENDED RELEASE ORAL DAILY
Qty: 90 TABLET | Refills: 0 | Status: SHIPPED | OUTPATIENT
Start: 2023-09-20

## 2023-09-20 NOTE — TELEPHONE ENCOUNTER
9/20 auth approved, letter in media, tracker updated, message sent to [facility] pharmacist to see if can fill it

## 2023-09-21 NOTE — TELEPHONE ENCOUNTER
Unsure if she will need an auth for her 30 mg in 3 months as the 39 was approved for a year.  So sending to you just in case

## 2023-09-29 ENCOUNTER — TELEPHONE (OUTPATIENT)
Age: 23
End: 2023-09-29

## 2023-09-29 NOTE — TELEPHONE ENCOUNTER
Patients GI provider:  Dr. Chloe Flanagan    Number to return call: ( [phone]    Reason for call: Pt calling to see if she should have flu immun with shingles immun. Pt was told by provider to have shingles immun already.       Scheduled procedure/appointment date if applicable: n/a

## 2023-09-29 NOTE — TELEPHONE ENCOUNTER
Patient calling office back in regards to the shingles vaccine. States that she went to the pharmacy today to receive the vaccine but was told her insurance does not want to cover the vaccine due to her age. Asking if the provider can write a letter or if the office can contact her insurance to try to get the vaccine approved.

## 2023-10-02 DIAGNOSIS — E66.01 MORBID OBESITY (HCC): Primary | ICD-10-CM

## 2023-10-12 ENCOUNTER — TELEPHONE (OUTPATIENT)
Dept: OTHER | Facility: OTHER | Age: 23
End: 2023-10-12

## 2023-10-13 NOTE — TELEPHONE ENCOUNTER
Patient is calling regarding cancelling an appointment.     Date/Time: 10/13/2023 8:15 AM    Patient was rescheduled: YES [] NO [x]    Patient requesting call back to reschedule: YES [] NO [x]

## 2023-10-16 ENCOUNTER — TELEPHONE (OUTPATIENT)
Age: 23
End: 2023-10-16

## 2023-10-16 NOTE — TELEPHONE ENCOUNTER
Patients GI provider:  Dr. Simon Collins    Number to return call: [phone]    Reason for call: Pt calling requesting a call back to confirm that it is ok if she starts taking the Wagovy injections with her current medications    Scheduled procedure/appointment date if applicable: Apt  18/78/48

## 2023-10-23 ENCOUNTER — OFFICE VISIT (OUTPATIENT)
Dept: FAMILY MEDICINE CLINIC | Facility: CLINIC | Age: 23
End: 2023-10-23
Payer: MEDICARE

## 2023-10-23 VITALS
TEMPERATURE: 97.7 F | HEART RATE: 96 BPM | OXYGEN SATURATION: 98 % | WEIGHT: 248 LBS | BODY MASS INDEX: 42.34 KG/M2 | SYSTOLIC BLOOD PRESSURE: 120 MMHG | HEIGHT: 64 IN | DIASTOLIC BLOOD PRESSURE: 80 MMHG

## 2023-10-23 DIAGNOSIS — R30.0 DYSURIA: Primary | ICD-10-CM

## 2023-10-23 DIAGNOSIS — K50.10 CROHN'S DISEASE OF LARGE INTESTINE WITHOUT COMPLICATION (HCC): ICD-10-CM

## 2023-10-23 DIAGNOSIS — Z23 NEED FOR PNEUMOCOCCAL VACCINE: ICD-10-CM

## 2023-10-23 DIAGNOSIS — Z23 NEED FOR HPV VACCINATION: ICD-10-CM

## 2023-10-23 DIAGNOSIS — Z23 ENCOUNTER FOR IMMUNIZATION: ICD-10-CM

## 2023-10-23 DIAGNOSIS — Z23 NEED FOR INFLUENZA VACCINATION: ICD-10-CM

## 2023-10-23 LAB
BILIRUB UR QL STRIP: NEGATIVE
CLARITY UR: CLEAR
COLOR UR: COLORLESS
GLUCOSE UR STRIP-MCNC: NEGATIVE MG/DL
HGB UR QL STRIP.AUTO: NEGATIVE
KETONES UR STRIP-MCNC: NEGATIVE MG/DL
LEUKOCYTE ESTERASE UR QL STRIP: NEGATIVE
NITRITE UR QL STRIP: NEGATIVE
PH UR STRIP.AUTO: 6.5 [PH]
PROT UR STRIP-MCNC: NEGATIVE MG/DL
SL AMB  POCT GLUCOSE, UA: NORMAL
SL AMB LEUKOCYTE ESTERASE,UA: NORMAL
SL AMB POCT BILIRUBIN,UA: NORMAL
SL AMB POCT BLOOD,UA: NORMAL
SL AMB POCT CLARITY,UA: CLEAR
SL AMB POCT COLOR,UA: CLEAR
SL AMB POCT KETONES,UA: NORMAL
SL AMB POCT NITRITE,UA: NORMAL
SL AMB POCT PH,UA: 6
SL AMB POCT SPECIFIC GRAVITY,UA: 1
SL AMB POCT URINE PROTEIN: NORMAL
SL AMB POCT UROBILINOGEN: 0.2
SP GR UR STRIP.AUTO: 1 (ref 1–1.03)
UROBILINOGEN UR STRIP-ACNC: <2 MG/DL

## 2023-10-23 PROCEDURE — 90472 IMMUNIZATION ADMIN EACH ADD: CPT

## 2023-10-23 PROCEDURE — 87086 URINE CULTURE/COLONY COUNT: CPT | Performed by: FAMILY MEDICINE

## 2023-10-23 PROCEDURE — 90471 IMMUNIZATION ADMIN: CPT

## 2023-10-23 PROCEDURE — 90677 PCV20 VACCINE IM: CPT

## 2023-10-23 PROCEDURE — 90651 9VHPV VACCINE 2/3 DOSE IM: CPT

## 2023-10-23 PROCEDURE — 81002 URINALYSIS NONAUTO W/O SCOPE: CPT | Performed by: FAMILY MEDICINE

## 2023-10-23 PROCEDURE — 81003 URINALYSIS AUTO W/O SCOPE: CPT | Performed by: FAMILY MEDICINE

## 2023-10-23 PROCEDURE — 99214 OFFICE O/P EST MOD 30 MIN: CPT | Performed by: FAMILY MEDICINE

## 2023-10-23 PROCEDURE — 90686 IIV4 VACC NO PRSV 0.5 ML IM: CPT

## 2023-10-23 NOTE — ASSESSMENT & PLAN NOTE
Chronic patient follow-up with GI for management condition recommendation per GI for. Patient is immunosuppressed for immunization and recommend 20 flu shot and HPV.   Today per patient she already had her inhibin a and B as a child but no record in the chart request a record she already had shingles vaccine first dose

## 2023-10-23 NOTE — PROGRESS NOTES
Name: Anitha Soliz      : 2000      MRN: [id number]  Encounter Provider: Jovi Bruner MD  Encounter Date: 10/23/2023   Encounter department: [address] [place] [address]     1. Dysuria  Assessment & Plan:  Acute symptomatic has been going on for 1 week no vaginal bleeding or discharge last menstruation was in the Clobavate urine dip in the office negative was sent for UA and CS we will follow-up accordingly    Orders:  -     Urine culture  -     UA (URINE) with reflex to Scope  -     POCT urine dip    2. Encounter for immunization  -     HPV VACCINE 9 VALENT IM  -     FLUZONE: influenza vaccine, quadrivalent, 0.5 mL  -     Pneumococcal Conjugate Vaccine 20-valent (Pcv20)    3. Crohn's disease of large intestine without complication [facility])  Assessment & Plan:  Chronic patient follow-up with GI for management condition recommendation per GI for. Patient is immunosuppressed for immunization and recommend 20 flu shot and HPV. Today per patient she already had her inhibin a and B as a child but no record in the chart request a record she already had shingles vaccine first dose      4. Need for influenza vaccination  -     FLUZONE: influenza vaccine, quadrivalent, 0.5 mL    5. Need for pneumococcal vaccine  -     Pneumococcal Conjugate Vaccine 20-valent (Pcv20)    6. Need for HPV vaccination  -     HPV VACCINE 9 VALENT IM        Depression Screening and Follow-up Plan: Patient was screened for depression during today's encounter. They screened negative with a PHQ-2 score of 0.         Subjective      Patient here with concern about burning sensation patient has been going on for 1 week no abdomen pain no flank pain no fever no vomiting no vaginal bleeding or discharge last menstruation was the first week of October she is not sexually active I reviewed with the patient the recommendation from the GI doctor tomorrow where she can follow him for ulcerative [General Appearance - Well Developed] : well developed colitis      Review of Systems   Constitutional:  Negative for chills and fever. HENT:  Negative for ear pain and sore throat. Eyes:  Negative for pain and visual disturbance. Respiratory:  Negative for cough and shortness of breath. Cardiovascular:  Negative for chest pain and palpitations. Gastrointestinal:  Negative for abdominal pain and vomiting. Endocrine: Negative for cold intolerance. Genitourinary:  Positive for dysuria. Negative for frequency, hematuria, vaginal discharge and vaginal pain. Musculoskeletal:  Negative for arthralgias and back pain. Skin:  Negative for color change and rash. Neurological:  Negative for seizures and syncope. All other systems reviewed and are negative. Current Outpatient Medications on File Prior to Visit   Medication Sig   • clindamycin (CLINDAGEL) 1 % gel APPLY TO AFFECTED AREA TWICE A DAY   • metFORMIN (GLUCOPHAGE-XR) 500 mg 24 hr tablet TAKE 1 TABLET BY MOUTH EVERY DAY WITH DINNER   • Multiple Vitamins-Minerals (HAIR SKIN AND NAILS FORMULA PO) Take by mouth   • predniSONE 10 mg tablet    • Semaglutide-Weight Management (WEGOVY) 0.25 MG/0.5ML Inject 0.5 mL (0.25 mg total) under the skin once a week   • Upadacitinib ER (Rinvoq) 45 MG TB24 Take 45 mg by mouth in the morning       Objective     /80 (BP Location: Left arm, Patient Position: Sitting)   Pulse 96   Temp 97.7 °F (36.5 °C) (Tympanic)   Ht 5' 4.25" (1.632 m)   Wt 112 kg (248 lb)   LMP 10/08/2023 (Exact Date)   SpO2 98%   Breastfeeding No   BMI 42.24 kg/m²     Physical Exam  Vitals and nursing note reviewed. Constitutional:       General: She is not in acute distress. Appearance: She is not diaphoretic. HENT:      Head: Normocephalic and atraumatic. Right Ear: Tympanic membrane normal. There is no impacted cerumen. Left Ear: Tympanic membrane normal. There is no impacted cerumen. Nose: Nose normal. No congestion or rhinorrhea.       Mouth/Throat: Pharynx: No posterior oropharyngeal erythema. Eyes:      General: No scleral icterus. Right eye: No discharge. Left eye: No discharge. Cardiovascular:      Rate and Rhythm: Normal rate and regular rhythm. Heart sounds: No murmur heard. Pulmonary:      Effort: Pulmonary effort is normal. No respiratory distress. Abdominal:      General: There is no distension. Tenderness: There is no abdominal tenderness. Musculoskeletal:         General: Normal range of motion. Cervical back: Normal range of motion. Right lower leg: No edema. Left lower leg: No edema. Skin:     Findings: No rash. Neurological:      Mental Status: She is alert and oriented to person, place, and time.        Sasha Carpenter MD [Normal Appearance] : normal appearance [Well Groomed] : well groomed [General Appearance - Well Nourished] : well nourished [No Deformities] : no deformities [General Appearance - In No Acute Distress] : no acute distress [Normal Conjunctiva] : the conjunctiva exhibited no abnormalities [Eyelids - No Xanthelasma] : the eyelids demonstrated no xanthelasmas [Normal Oral Mucosa] : normal oral mucosa [No Oral Pallor] : no oral pallor [No Oral Cyanosis] : no oral cyanosis [Normal Jugular Venous A Waves Present] : normal jugular venous A waves present [Normal Jugular Venous V Waves Present] : normal jugular venous V waves present [No Jugular Venous Bates A Waves] : no jugular venous bates A waves [Respiration, Rhythm And Depth] : normal respiratory rhythm and effort [Exaggerated Use Of Accessory Muscles For Inspiration] : no accessory muscle use [Auscultation Breath Sounds / Voice Sounds] : lungs were clear to auscultation bilaterally [Heart Rate And Rhythm] : heart rate and rhythm were normal [Heart Sounds] : normal S1 and S2 [Murmurs] : no murmurs present [Abdomen Soft] : soft [Abdomen Tenderness] : non-tender [Abdomen Mass (___ Cm)] : no abdominal mass palpated [Abnormal Walk] : normal gait [Gait - Sufficient For Exercise Testing] : the gait was sufficient for exercise testing [Nail Clubbing] : no clubbing of the fingernails [Cyanosis, Localized] : no localized cyanosis [Petechial Hemorrhages (___cm)] : no petechial hemorrhages [Skin Color & Pigmentation] : normal skin color and pigmentation [] : no rash [No Venous Stasis] : no venous stasis [Skin Lesions] : no skin lesions [No Skin Ulcers] : no skin ulcer [No Xanthoma] : no  xanthoma was observed [Oriented To Time, Place, And Person] : oriented to person, place, and time [Affect] : the affect was normal [Mood] : the mood was normal [No Anxiety] : not feeling anxious [FreeTextEntry1] : IR

## 2023-10-23 NOTE — ASSESSMENT & PLAN NOTE
Acute symptomatic has been going on for 1 week no vaginal bleeding or discharge last menstruation was in the Clobavate urine dip in the office negative was sent for UA and CS we will follow-up accordingly

## 2023-10-24 LAB — BACTERIA UR CULT: NORMAL

## 2023-11-06 DIAGNOSIS — E66.01 MORBID OBESITY (HCC): Primary | ICD-10-CM

## 2023-11-06 NOTE — TELEPHONE ENCOUNTER
Patient is wondering if we can put in next dose for wegovy. She is also wondering if there is anything OTC for constipation that won't affect her IBS.

## 2023-11-09 DIAGNOSIS — K51.319 ULCERATIVE RECTOSIGMOIDITIS WITH COMPLICATION (HCC): ICD-10-CM

## 2023-11-09 RX ORDER — UPADACITINIB 45 MG/1
TABLET, EXTENDED RELEASE ORAL
Qty: 28 TABLET | Refills: 10 | Status: SHIPPED | OUTPATIENT
Start: 2023-11-09

## 2023-11-30 ENCOUNTER — TELEPHONE (OUTPATIENT)
Dept: FAMILY MEDICINE CLINIC | Facility: CLINIC | Age: 23
End: 2023-11-30

## 2023-11-30 NOTE — TELEPHONE ENCOUNTER
Patient currently on last pen of 0.5 mg needs refill what dosage is she supposed to get please advise

## 2023-12-01 DIAGNOSIS — E66.01 MORBID OBESITY (HCC): Primary | ICD-10-CM

## 2023-12-04 ENCOUNTER — TELEPHONE (OUTPATIENT)
Dept: FAMILY MEDICINE CLINIC | Facility: CLINIC | Age: 23
End: 2023-12-04

## 2023-12-11 ENCOUNTER — OFFICE VISIT (OUTPATIENT)
Dept: GASTROENTEROLOGY | Facility: CLINIC | Age: 23
End: 2023-12-11
Payer: MEDICARE

## 2023-12-11 VITALS
HEIGHT: 64 IN | SYSTOLIC BLOOD PRESSURE: 122 MMHG | TEMPERATURE: 98.4 F | HEART RATE: 83 BPM | DIASTOLIC BLOOD PRESSURE: 80 MMHG | WEIGHT: 245.2 LBS | BODY MASS INDEX: 41.86 KG/M2 | OXYGEN SATURATION: 97 %

## 2023-12-11 DIAGNOSIS — R10.13 DYSPEPSIA: ICD-10-CM

## 2023-12-11 DIAGNOSIS — K21.9 GASTROESOPHAGEAL REFLUX DISEASE, UNSPECIFIED WHETHER ESOPHAGITIS PRESENT: ICD-10-CM

## 2023-12-11 DIAGNOSIS — R73.01 IFG (IMPAIRED FASTING GLUCOSE): ICD-10-CM

## 2023-12-11 DIAGNOSIS — K51.319 ULCERATIVE RECTOSIGMOIDITIS WITH COMPLICATION (HCC): Primary | ICD-10-CM

## 2023-12-11 DIAGNOSIS — R10.9 ABDOMINAL CRAMPING: ICD-10-CM

## 2023-12-11 PROCEDURE — 99215 OFFICE O/P EST HI 40 MIN: CPT | Performed by: INTERNAL MEDICINE

## 2023-12-11 RX ORDER — OMEPRAZOLE 40 MG/1
40 CAPSULE, DELAYED RELEASE ORAL DAILY
Qty: 90 CAPSULE | Refills: 1 | Status: SHIPPED | OUTPATIENT
Start: 2023-12-11

## 2023-12-11 RX ORDER — DICYCLOMINE HYDROCHLORIDE 10 MG/1
10 CAPSULE ORAL 2 TIMES DAILY PRN
Qty: 60 CAPSULE | Refills: 3 | Status: SHIPPED | OUTPATIENT
Start: 2023-12-11

## 2023-12-11 RX ORDER — METFORMIN HYDROCHLORIDE 500 MG/1
500 TABLET, EXTENDED RELEASE ORAL
Qty: 30 TABLET | Refills: 2 | Status: SHIPPED | OUTPATIENT
Start: 2023-12-11 | End: 2023-12-12 | Stop reason: SDUPTHER

## 2023-12-11 NOTE — PROGRESS NOTES
Gastroenterology Outpatient Follow-up - Crohn's Disease  Francheska Sarmiento 20 y.o. female MRN: [id number]  Encounter: [id number]    Francheska Sarmiento is a 20 y.o. female with Ulcerative colitis. Symptom onset:     Diagnosis:  ulcerative colitis  Year of diagnosis:  2014    IBD Summary: Duglas Fox has left-sided ulcerative colitis with pancolitis on biopsies, and has already been on infliximab, adalimumab, ustekinumab, and ozanimod with loss of response her primary nonresponse or adverse events. She has had significant steroid exposure. We are starting upadacitinib in 10/2023.     Ulcerative Colitis Summary  Macroscopic extent of diseases: ulcerative proctitis  Microscopic extent of diseases:  pancolitis  Has the patient ever been hospitalized for severe disease: Yes        Surgical History  Number of IBD surgeries: 0      First IBD surgery:    Most Recent IBD surgery:    Esophageal:  0    Gastroduodenal:  0    Small bowel resection(s):  0    Ileocolonic resection(s): 0      Colonic resection(s):  0    Ileostomy or colostomy:  no previous ostomy    Complete colectomy:  No         Medications     Year last used Reason for discontinuation   Corticosteroids prior   2023 TX     Thiopurines   never         Methotrexate   never         Infliximab prior   2015 AE infusion reation   Adalimumab prior   2017 JENNY anti-ADA   Certolizumab   never         Golimumab   never         Natalizumab   never         Mesalamine   never         Sulfasalzine   never         Vedolizumab   never         Ustekinumab prior   2023 JENNY     Tofacitinib   never         Other biologic prior   2023 AE Ozanimod - blurry vision and light sensitivity       Extraintestinal Manifestations    IBD-associated arthropathy Yes    Uveitis No    Oral aphthous ulcers No   Erythema nodosum No    Pyoderma gangrenosum No    Primary sclerosing cholangitis No    Thrombotic complications No          Cancer / Dysplasia History  History of IBD-associated dysplasia: none    Date of diagnosis (Year):     History of colorectal cancer: No   History of cervical dysplasia: No   History of skin cancer: none         Laboratory Data   Most recent (date) Result   PPD   unknown   Quanitferon gold 2/7/2020 negative   TPMT   unknown   Hepatitis A 2/7/2020 positive   HBsAb 2/7/2020 negative   HBcAb 2/7/2020 negative   HBsAg 2/7/2020 negative   HCV Ab 2/7/2020 negative       Imaging / Diagnostic Procedures   Most recent (date) Findings   Colonoscopy or sigmoidoscopy #1 2/7/2023            Ulcers/Erosions  no erosions           Strictures  none           Stricture Severity  N/A           Endoscopic Score Vargas Score:    Rutgeert's:  N/A            Findings  Digital rectal examination showed medium sized internal hemorrhoids. Mild erythema and distortion of normal vasculature in rectum without ulceration and without friability (vargas endoscopic subscore 1). The remainder of the colon and terminal ileum appeared normal. Biopsies taken. Prep was fair. Pathology  A. TERMINAL ILEUM, BIOPSY:   Ileal mucosa with no active inflammation seen. Negative for dysplasia. B. ASCENDING COLON, BIOPSY:   Focal minimal active colitis. Negative for dysplasia. C. TRANSVERSE, BIOPSY:   Colonic mucosa with no active colitis seen. Negative for dysplasia:   D. LEFT COLON, BIOPSY:   Colonic mucosa with no active colitis seen. Negative for dysplasia. E. RECTUM COLON, BIOPSY:   Active chronic colitis, moderately severe. Negative for dysplasia. Colonoscopy or sigmoidoscopy #2              Ulcers/Erosions                 Strictures                 Stricture Severity              Endoscopic Score Vargas Score:    Rugeert's:              Findings              Pathology      EGD       Small bowel follow-through       CT enterography       CT without enterography 7/24/2023 Mild distal colonic wall thickening.    MRI enterography       Capsule study       DEXA scan   Lowest Z-score:      CXR (for TB)           HPI: Interval History:  12/11/2023 Follow up  Since last visit, she started upadacitinib and tapered off steroids 2 weeks ago. Also restarted Wegovy. She has improved stool consistency, less urgency, less bleeding. Feels that upadacitinib is working very well. Had some constipation and taking Miralax. Gets some cramping when she is anxious. Got flu vaccine. Also got Shingrix x2. Also got Prevnar 20. Has new GERD and burping since starting Wegovy. Needs annual pap smear and Derm. 9/8/2023 Initial visit  Iam Guillermoin is establishing care with me for left-sided ulcerative colitis. Has been managed at [facility]. She was diagnosed in 2014 after she started having rectal bleeding, diarrhea, and abdominal cramping. After a month of the symptoms, she became lightheaded and weak and had to be hospitalized. She also lost a lot of weight. She required a blood transfusion in the hospital and had a colonoscopy. She cannot recall whether she was diagnosed with ulcerative colitis or Crohn's disease, but it sounds like left-sided UC based on available records. She is quite certain that she never had small bowel involvement. She was initially treated with IV steroids, then infliximab was started in 2014. She did well with infliximab, but unfortunately had a severe infusion reaction with hives and respiratory distress after less than a year. She then went back on prednisone, then transition to adalimumab. Adalimumab worked for about 18 months, then she lost response around 2017 and was told that she had developed antiadalimumab antibodies. She again was put on steroids, then ustekinumab was started in 2019. She did very well with ustekinumab, but unfortunately flared up again and colonoscopy in 2/2023 showed Vargas 1 disease in the rectum. At that time, she was already on every 4 week dosing of ustekinumab. Therefore, she was put back on steroids and started ozanimod in April 2023.   After 10 weeks of ozanimod, she developed blurry vision and light sensitivity, so the treatment was interrupted in May. She has not yet seen ophthalmology, but fortunately her vision is improved. In addition, she felt that, with ozanimod, she was not able to get off steroids. She has now been on prednisone for the past 7 months. Current prednisone dose is 10 mg. She has gained 20 pounds since February from being on steroids. She is also having joint pain. She also has diabetes and has been on metformin for the past 2 years. She started [facility], but it is no longer available. Has chronic nausea. Currently having watery diarrhea with bleeding and urgency. History of C. difficile in 2021, negative test on 8/7/2023. Hasn't been able to work this year because of her IBD. Non smoker. No FH of IBD. She may have HS - describes pustular lesions in axillae and groin - and saw Derm a few years ago; will be seeing Derm in October again for hair loss. Last pap smear was a year ago. Has had 1 Shingrix vaccine. No flu shto this year. No COVID booster. No recent pneumonia vaccines. Infectious studies from 8/7/2023 were negative. Azeem Murguia reports the following symptoms over the last 7 days:    Stool Frequency normal   Average stools per day: 2   Average liquid stools per day: 0   Consistency of bowel: formed   Awakening from sleep to move bowels? No   Urgency no fecal urgency   Visible blood in stool? none   Abdominal pain? none   General Wellbeing? generally well     Azeem Murguia also reports the following symptoms in the last month:    Leakage of stool while sleeping? No   Leakage of stool while awake?  No       REVIEW OF SYSTEMS:  During the last 7 days, Azeem Murguia experienced the following symptoms:  Unintentional Weight Loss (in the last month) No   Fever No   Eye irritation No   Mouth sores No   Sore throat No   Chest pain No   Shortness of breath No   Numbness or tingling in hands or feet No   Skin rash No   Pain or swelling in joints No   Bruising or bleeding No   Felt depressed or blue No   Fatigue No   Dysuria No   Please see HPI for additional pertinent review of systems; otherwise remainder of ROS was unremarkable    MEDICATIONS:    Current Outpatient Medications:     dicyclomine (BENTYL) 10 mg capsule    metFORMIN (GLUCOPHAGE-XR) 500 mg 24 hr tablet    Multiple Vitamins-Minerals (HAIR SKIN AND NAILS FORMULA PO)    omeprazole (PriLOSEC) 40 MG capsule    Rinvoq 45 MG TB24    Semaglutide-Weight Management (WEGOVY) 1 MG/0.5ML    clindamycin (CLINDAGEL) 1 % gel    predniSONE 10 mg tablet    ALLERGIES:  Allergies   Allergen Reactions    Infliximab Hives     Difficulty breathing as well     Mushroom Extract Complex - Food Allergy Hives     Breaks out hives, only when she eats it. OBJECTIVE:  /80 (BP Location: Left arm, Patient Position: Sitting, Cuff Size: Adult)   Pulse 83   Temp 98.4 °F (36.9 °C) (Tympanic)   Ht 5' 4" (1.626 m)   Wt 111 kg (245 lb 3.2 oz)   SpO2 97%   BMI 42.09 kg/m²      PHYSICAL EXAM:    General Appearance:   Alert, cooperative, no distress   HEENT:   Normocephalic, atraumatic, anicteric. Neck:  Supple, symmetrical, trachea midline   Lungs:   Clear to auscultation bilaterally; no rales, rhonchi or wheezing; respirations unlabored    Heart[de-identified]   Regular rate and rhythm; no murmur, rub, or gallop. Abdomen:   Soft, non-distended; normal bowel sounds    Abdominal exam was notable for no tenderness with no mass. Genitalia:   Deferred    Rectal:   Perirectal assessment was not performed.                      Extremities:  No cyanosis, clubbing or edema    Pulses:  2+ and symmetric    Skin:  No jaundice, rashes, or lesions    Lymph nodes:  No palpable cervical lymphadenopathy        Lab Results   Component Value Date    WBC 8.72 08/07/2023    HGB 13.7 08/07/2023    HCT 43.8 08/07/2023    MCV 93 08/07/2023     08/07/2023     Lab Results   Component Value Date    SODIUM 139 06/26/2023    K 4.1 06/26/2023     06/26/2023    CO2 27 06/26/2023    AGAP 8 06/26/2023    BUN 24 06/26/2023    CREATININE 0.81 06/26/2023    GLUC 102 (H) 10/19/2022    GLUF 91 06/26/2023    CALCIUM 9.4 06/26/2023    AST 17 06/26/2023    ALT 26 06/26/2023    ALKPHOS 60 06/26/2023    TP 7.1 06/26/2023    TBILI 0.35 06/26/2023    EGFR 101 06/26/2023     Lab Results   Component Value Date    CRP 14.5 (H) 06/26/2023     No results found for: "EDNLEHDH16"  Lab Results   Component Value Date    FERRITIN 27 08/07/2023       ASSESSMENT AND PLAN:    Socorro Decker has a history of macroscopic proctitis and microscopic pancolitis  ulcerative colitis diagnosed in 2014. Current medical therapy is with upadacitinib 45 mg daily. My global assessment is that the clinical disease is currently quiescent. The 6-point Vargas score was 0 and the 9-point Vargas score was 0. The short CDAI was 44. Socorro Decker has left-sided ulcerative colitis with microscopic evidence of pancolitis. She has significant exposure to steroids and has also been on infliximab, adalimumab, ustekinumab, and ozanimod - all with adverse events, loss of response, or primary non-response. She recent restarted upadacitinib and, fortunately, is doing well so far. She is finally off steroids. She is exercising and trying to lose weight. She also started [facility] MELANIE. Bowel function is fairly normal.  She has some GERD likely from [facility]. 1.  Complete 8 weeks of upadacitinib 45 mg daily, then switch to 30 mg.  2.  She has completed Shingrix vaccines. 3.  I do recommend flu and COVID vaccines. 4.  She is overdue for blood work and I have asked her to complete CBC, CMP, CRP, iron studies, vitamin D hepatitis lab, QuantiFERON gold from September, ASAP. 5.  Annual Pap smear and dermatology exam.  6.  Trial of dicyclomine for occasional cramping. 7.  Start omeprazole 40 mg daily. Take 30 minutes before breakfast.  Return in 3 months.       Health Maintenance Recommendations:  Vaccines & Infections  COVID-19 vaccination and boosters are recommended. There is no evidence that the COVID-19 vaccine would cause an IBD flare. Avoid live vaccines if on immunosuppressive therapy. Yearly influenza vaccine (flu shot). Pneumonia vaccines for patients on immunosuppression. These include Prevnar 20, followed by Pneumovax 23 at least 8 weeks later. Shingrix vaccine (series of 2 injections) for al patients 65 and older. Patients on tofacitinib or upadacitinib should be vaccinated regardless of age. If not immune to measles mumps or rubella, MMR vaccine is recommended. However, this is a live vaccine and should be given prior to immunosuppressive therapy. HPV vaccination as per national guidelines. Hepatitis A and B vaccinations if not previously vaccinated. Testing for tuberculosis with QuantiFERON Gold blood test and/or chest xray prior to starting immunosuppressive medications, and then annually    Cancer screening  Dysplasia surveillance for colorectal cancer. Colonoscopy in all patients with extensive colitis (more than 1/3 of the colon involved) who had disease for at least 8 or more years. Repeat colonoscopy approximately every 12-24 months. In patients with concurrent primary sclerosing cholangitis, history of dysplasia, or family history of colon cancer, repeat colonoscopy annually. Females: Pap smear annually for woman on immunosuppression. Annual dermatologic/skin exam in all patients with IBD, especially those on immunosuppression with thiopurines or GERARDO inhibitors.     Miscellaneous  DEXA scan, once off steroids for 3 months  Depression screening recommended annually  Routine dental and ophthalmology examinations    Problem List Items Addressed This Visit    None  Visit Diagnoses       Abdominal cramping    -  Primary    Relevant Medications    dicyclomine (BENTYL) 10 mg capsule    Gastroesophageal reflux disease, unspecified whether esophagitis present        Relevant Medications dicyclomine (BENTYL) 10 mg capsule    omeprazole (PriLOSEC) 40 MG capsule            Thomas Paul MD

## 2023-12-11 NOTE — Clinical Note
Hi, she is on upadacitinib. She needs to complete 8 weeks of 45 mg, then switch to to 30 mg. Thanks!

## 2023-12-12 DIAGNOSIS — R73.01 IFG (IMPAIRED FASTING GLUCOSE): ICD-10-CM

## 2023-12-13 ENCOUNTER — TELEPHONE (OUTPATIENT)
Dept: GASTROENTEROLOGY | Facility: CLINIC | Age: 23
End: 2023-12-13

## 2023-12-13 DIAGNOSIS — K51.319 ULCERATIVE RECTOSIGMOIDITIS WITH COMPLICATION (HCC): ICD-10-CM

## 2023-12-13 RX ORDER — METFORMIN HYDROCHLORIDE 500 MG/1
500 TABLET, EXTENDED RELEASE ORAL
Qty: 30 TABLET | Refills: 2 | Status: SHIPPED | OUTPATIENT
Start: 2023-12-13

## 2023-12-13 NOTE — TELEPHONE ENCOUNTER
----- Message from Angle Sadler MD sent at 12/12/2023  1:02 PM EST -----  Hi, she is on upadacitinib. She needs to complete 8 weeks of 45 mg, then switch to to 30 mg. Thanks!

## 2023-12-15 ENCOUNTER — OFFICE VISIT (OUTPATIENT)
Dept: FAMILY MEDICINE CLINIC | Facility: CLINIC | Age: 23
End: 2023-12-15
Payer: MEDICARE

## 2023-12-15 VITALS
TEMPERATURE: 97.9 F | WEIGHT: 247 LBS | OXYGEN SATURATION: 95 % | BODY MASS INDEX: 41.15 KG/M2 | HEART RATE: 80 BPM | SYSTOLIC BLOOD PRESSURE: 120 MMHG | DIASTOLIC BLOOD PRESSURE: 80 MMHG | HEIGHT: 65 IN

## 2023-12-15 DIAGNOSIS — Z13.29 SCREENING FOR THYROID DISORDER: ICD-10-CM

## 2023-12-15 DIAGNOSIS — Z12.4 CERVICAL CANCER SCREENING: ICD-10-CM

## 2023-12-15 DIAGNOSIS — K21.9 GASTROESOPHAGEAL REFLUX DISEASE, UNSPECIFIED WHETHER ESOPHAGITIS PRESENT: ICD-10-CM

## 2023-12-15 DIAGNOSIS — Z00.01 ENCOUNTER FOR GENERAL ADULT MEDICAL EXAMINATION WITH ABNORMAL FINDINGS: Primary | ICD-10-CM

## 2023-12-15 DIAGNOSIS — E66.01 MORBID OBESITY (HCC): ICD-10-CM

## 2023-12-15 DIAGNOSIS — Z80.8 FAMILY HISTORY OF THYROID CANCER: ICD-10-CM

## 2023-12-15 PROCEDURE — 99214 OFFICE O/P EST MOD 30 MIN: CPT | Performed by: FAMILY MEDICINE

## 2023-12-15 PROCEDURE — 99395 PREV VISIT EST AGE 18-39: CPT | Performed by: FAMILY MEDICINE

## 2023-12-15 NOTE — PROGRESS NOTES
ADULT ANNUAL PHYSICAL  [facility]    NAME: Pamella Stein  AGE: 23 y.o. SEX: female  : 2000     DATE: 2023     Assessment and Plan:     Problem List Items Addressed This Visit     Encounter for general adult medical examination with abnormal findings - Primary     Advice and education were given regarding nutrition, aerobic exercises, weight-bearing exercises, cardiovascular risk reduction, fall risk reduction, and age-appropriate supplements.     The patient was counseled regarding instructions for management, risk factor reductions, prognosis, risks and benefits of treatment options, patient and family education, and importance of compliance with treatment.         Morbid obesity (HCC)     BMI today 41.74 discussed with patient portion control low-carb low-fat diet increase physical activity         Gastroesophageal reflux disease     New diagnosis symptomatic recommend start omeprazole 40 mg once a day proper use and possible side effects reviewed for 8-week  Anti -reflex measures :  · Raise the head of the bed 4 to 6 inches.   · Avoid smoking, alcohol  · Avoid excess coffee, tea or other caffeinated beverages.   · Avoid garments that fit tightly through the abdomen.   · Avoid eating before bed.   · Weight loss is beneficial.         Cervical cancer screening     Patient is due for cervical cancer screening discussed the patient she agreed for her to see GYN         Relevant Orders    Ambulatory Referral to Gynecology   Other Visit Diagnoses     Family history of thyroid cancer        Relevant Orders    TSH, 3rd generation with Free T4 reflex    Screening for thyroid disorder        Relevant Orders    TSH, 3rd generation with Free T4 reflex            Immunizations and preventive care screenings were discussed with patient today. Appropriate education was printed on patient's after visit summary.    Counseling:  Alcohol/drug  use: discussed moderation in alcohol intake, the recommendations for healthy alcohol use, and avoidance of illicit drug use.  Dental Health: discussed importance of regular tooth brushing, flossing, and dental visits.  Injury prevention: discussed safety/seat belts, safety helmets, smoke detectors, carbon dioxide detectors, and smoking near bedding or upholstery.  Sexual health: discussed sexually transmitted diseases, partner selection, use of condoms, avoidance of unintended pregnancy, and contraceptive alternatives.  Exercise: the importance of regular exercise/physical activity was discussed. Recommend exercise 3-5 times per week for at least 30 minutes.     BMI Counseling: Body mass index is 41.74 kg/m². The BMI is above normal. Nutrition recommendations include decreasing portion sizes, decreasing fast food intake and limiting drinks that contain sugar. Exercise recommendations include exercising 3-5 times per week. No pharmacotherapy was ordered. Rationale for BMI follow-up plan is due to patient being overweight or obese.     Depression Screening and Follow-up Plan: Patient was screened for depression during today's encounter. They screened negative with a PHQ-2 score of 0.        Return in about 1 year (around 12/15/2024).     Chief Complaint:     Chief Complaint   Patient presents with   • Physical Exam      History of Present Illness:     Adult Annual Physical   Patient here for a comprehensive physical exam. The patient reports problems - patient concerned about the heartburn no nausea no vomiting no abdomen distention no weight loss no change in the diet she been taking antiacid medication over-the-counter and is not helping .    Diet and Physical Activity  Diet/Nutrition:  No special diet .   Exercise: 3-4 times a week on average.      Depression Screening  PHQ-2/9 Depression Screening    Little interest or pleasure in doing things: 0 - not at all  Feeling down, depressed, or hopeless: 0 - not at  all  PHQ-2 Score: 0  PHQ-2 Interpretation: Negative depression screen       General Health  Sleep: sleeps poorly.   Hearing: normal - bilateral.  Vision: wears glasses.   Dental: regular dental visits.       /GYN Health  Follows with gynecology? no   History of STDs?: no.     Advanced Care Planning  Do you have an advanced directive? no  Do you have a durable medical power of ? no     Review of Systems:     Review of Systems   Constitutional:  Negative for chills and fever.   HENT:  Negative for ear pain and sore throat.    Eyes:  Negative for pain and visual disturbance.   Respiratory:  Negative for cough and shortness of breath.    Cardiovascular:  Negative for chest pain and palpitations.   Gastrointestinal:  Negative for abdominal pain, constipation, diarrhea and vomiting.        Heartburn   Genitourinary:  Negative for dysuria and hematuria.   Musculoskeletal:  Negative for arthralgias and back pain.   Skin:  Negative for color change and rash.   Neurological:  Negative for seizures and syncope.   Hematological:  Does not bruise/bleed easily.   Psychiatric/Behavioral:  Negative for behavioral problems.    All other systems reviewed and are negative.     Past Medical History:     Past Medical History:   Diagnosis Date   • Chest pain 10/28/2022   • Class 1 obesity due to excess calories without serious comorbidity in adult 7/16/2020   • Crohn's disease (HCC)    • Dysuria 3/11/2022   • Fever 12/8/2021   • Frequency of urination 7/11/2022   • Hidradenitis suppurativa     thighs   • Nasal congestion 6/1/2022   • Obesity, Class II, BMI 35-39.9    • Sore throat 6/1/2022   • TMJ (temporomandibular joint disorder)     last assessed 02/24/2016   • Urinary tract infection    • Varicella     vaccine      Past Surgical History:     Past Surgical History:   Procedure Laterality Date   • COLONOSCOPY      x several; last one 2/2020   • WISDOM TOOTH EXTRACTION        Social History:     Social History      Socioeconomic History   • Marital status: Single     Spouse name: None   • Number of children: None   • Years of education: None   • Highest education level: None   Occupational History   • None   Tobacco Use   • Smoking status: Never     Passive exposure: Never   • Smokeless tobacco: Never   • Tobacco comments:     hooka smoke   Vaping Use   • Vaping status: Never Used   Substance and Sexual Activity   • Alcohol use: No   • Drug use: No   • Sexual activity: Yes     Partners: Male   Other Topics Concern   • None   Social History Narrative   • None     Social Determinants of Health     Financial Resource Strain: Not on file   Food Insecurity: Not on file   Transportation Needs: Not on file   Physical Activity: Not on file   Stress: Not on file   Social Connections: Not on file   Intimate Partner Violence: Not on file   Housing Stability: Not on file      Family History:     Family History   Problem Relation Age of Onset   • Diabetes Mother         Due to underlying condition with foot ulcer.  Type 2 DM without complication.   • Diabetes Father    • Polycystic ovary syndrome Sister    • Lung cancer Maternal Uncle    • Stroke Neg Hx    • Heart attack Neg Hx    • Hypertension Neg Hx    • Breast cancer Neg Hx    • Colon cancer Neg Hx    • Ovarian cancer Neg Hx    • Uterine cancer Neg Hx       Current Medications:     Current Outpatient Medications   Medication Sig Dispense Refill   • dicyclomine (BENTYL) 10 mg capsule Take 1 capsule (10 mg total) by mouth 2 (two) times a day as needed (cramping) 60 capsule 3   • metFORMIN (GLUCOPHAGE-XR) 500 mg 24 hr tablet Take 1 tablet (500 mg total) by mouth daily with dinner 30 tablet 2   • Multiple Vitamins-Minerals (HAIR SKIN AND NAILS FORMULA PO) Take by mouth     • omeprazole (PriLOSEC) 40 MG capsule Take 1 capsule (40 mg total) by mouth daily 90 capsule 1   • Rinvoq 45 MG TB24 Take 1 tablet ( 45 mg) by mouth once daily in the morning. 28 tablet 10   • Semaglutide-Weight  "Management (WEGOVY) 1 MG/0.5ML Inject 0.5 mL (1 mg total) under the skin once a week 2 mL 0     No current facility-administered medications for this visit.      Allergies:     Allergies   Allergen Reactions   • Infliximab Hives     Difficulty breathing as well    • Mushroom Extract Complex - Food Allergy Hives     Breaks out hives, only when she eats it.      Physical Exam:     /80 (BP Location: Left arm, Patient Position: Sitting)   Pulse 80   Temp 97.9 °F (36.6 °C) (Tympanic)   Ht 5' 4.5\" (1.638 m)   Wt 112 kg (247 lb)   LMP 12/07/2023 (Exact Date)   SpO2 95%   Breastfeeding No   BMI 41.74 kg/m²     Physical Exam  Constitutional:       General: She is not in acute distress.     Appearance: She is well-developed and normal weight. She is not toxic-appearing or diaphoretic.   HENT:      Head: Normocephalic and atraumatic.      Right Ear: Tympanic membrane, ear canal and external ear normal. There is no impacted cerumen.      Left Ear: Tympanic membrane, ear canal and external ear normal. There is no impacted cerumen.      Nose: Nose normal. No congestion or rhinorrhea.      Mouth/Throat:      Mouth: Mucous membranes are moist.      Pharynx: Oropharynx is clear. No oropharyngeal exudate or posterior oropharyngeal erythema.   Eyes:      General: No scleral icterus.        Right eye: No discharge.         Left eye: No discharge.      Conjunctiva/sclera: Conjunctivae normal.      Pupils: Pupils are equal, round, and reactive to light.   Neck:      Thyroid: No thyromegaly.   Cardiovascular:      Rate and Rhythm: Normal rate and regular rhythm.      Heart sounds: No murmur heard.  Pulmonary:      Effort: Pulmonary effort is normal. No respiratory distress.      Breath sounds: Normal breath sounds. No wheezing or rales.   Chest:      Chest wall: No tenderness.   Abdominal:      General: There is no distension.      Palpations: Abdomen is soft. There is no mass.      Tenderness: There is no abdominal " tenderness.      Hernia: No hernia is present. There is no hernia in the left inguinal area.   Genitourinary:     Labia:         Right: No rash.         Left: No rash.       Vagina: No foreign body. No tenderness or bleeding.      Cervix: No cervical motion tenderness.      Adnexa:         Right: No mass or tenderness.          Left: No mass or tenderness.     Musculoskeletal:         General: Normal range of motion.      Cervical back: Normal range of motion. No rigidity.   Lymphadenopathy:      Cervical: No cervical adenopathy.   Skin:     General: Skin is warm.      Findings: No rash.   Neurological:      Mental Status: She is alert and oriented to person, place, and time.      Motor: No abnormal muscle tone.          Melchor Epstein MD   [facility]

## 2023-12-17 PROBLEM — Z12.4 CERVICAL CANCER SCREENING: Status: ACTIVE | Noted: 2023-12-17

## 2023-12-17 PROBLEM — Z12.4 CERVICAL CANCER SCREENING: Status: ACTIVE | Noted: 2023-12-15

## 2023-12-17 PROBLEM — K21.9 GASTROESOPHAGEAL REFLUX DISEASE: Status: ACTIVE | Noted: 2023-12-17

## 2023-12-17 PROBLEM — K21.9 GASTROESOPHAGEAL REFLUX DISEASE: Status: ACTIVE | Noted: 2023-12-15

## 2023-12-17 NOTE — ASSESSMENT & PLAN NOTE
BMI today 41.74 discussed with patient portion control low-carb low-fat diet increase physical activity

## 2023-12-17 NOTE — ASSESSMENT & PLAN NOTE
New diagnosis symptomatic recommend start omeprazole 40 mg once a day proper use and possible side effects reviewed for 8-week  Anti -reflex measures :  · Raise the head of the bed 4 to 6 inches.   · Avoid smoking, alcohol  · Avoid excess coffee, tea or other caffeinated beverages.   · Avoid garments that fit tightly through the abdomen.   · Avoid eating before bed.   · Weight loss is beneficial.

## 2023-12-17 NOTE — ASSESSMENT & PLAN NOTE
Advice and education were given regarding nutrition, aerobic exercises, weight-bearing exercises, cardiovascular risk reduction, fall risk reduction, and age-appropriate supplements.     The patient was counseled regarding instructions for management, risk factor reductions, prognosis, risks and benefits of treatment options, patient and family education, and importance of compliance with treatment.

## 2023-12-18 ENCOUNTER — APPOINTMENT (OUTPATIENT)
Dept: LAB | Facility: HOSPITAL | Age: 23
End: 2023-12-18
Payer: MEDICARE

## 2023-12-18 DIAGNOSIS — Z80.8 FAMILY HISTORY OF THYROID CANCER: ICD-10-CM

## 2023-12-18 DIAGNOSIS — Z13.29 SCREENING FOR THYROID DISORDER: ICD-10-CM

## 2023-12-18 DIAGNOSIS — K51.319 ULCERATIVE RECTOSIGMOIDITIS WITH COMPLICATION (HCC): Primary | ICD-10-CM

## 2023-12-18 LAB
25(OH)D3 SERPL-MCNC: 26.2 NG/ML (ref 30–100)
ALBUMIN SERPL BCP-MCNC: 4.3 G/DL (ref 3.5–5)
ALP SERPL-CCNC: 55 U/L (ref 34–104)
ALT SERPL W P-5'-P-CCNC: 11 U/L (ref 7–52)
ANION GAP SERPL CALCULATED.3IONS-SCNC: 7 MMOL/L
AST SERPL W P-5'-P-CCNC: 12 U/L (ref 13–39)
BILIRUB SERPL-MCNC: 0.69 MG/DL (ref 0.2–1)
BUN SERPL-MCNC: 18 MG/DL (ref 5–25)
CALCIUM SERPL-MCNC: 9.4 MG/DL (ref 8.4–10.2)
CHLORIDE SERPL-SCNC: 102 MMOL/L (ref 96–108)
CO2 SERPL-SCNC: 28 MMOL/L (ref 21–32)
CREAT SERPL-MCNC: 0.85 MG/DL (ref 0.6–1.3)
CRP SERPL QL: 7.7 MG/L
ERYTHROCYTE [DISTWIDTH] IN BLOOD BY AUTOMATED COUNT: 12.4 % (ref 11.6–15.1)
FERRITIN SERPL-MCNC: 53 NG/ML (ref 11–307)
GFR SERPL CREATININE-BSD FRML MDRD: 95 ML/MIN/1.73SQ M
GLUCOSE P FAST SERPL-MCNC: 86 MG/DL (ref 65–99)
HAV AB SER QL IA: REACTIVE
HBV CORE AB SER QL: NORMAL
HBV SURFACE AB SER-ACNC: <3 MIU/ML
HBV SURFACE AG SER QL: NORMAL
HCT VFR BLD AUTO: 42.7 % (ref 34.8–46.1)
HCV AB SER QL: NORMAL
HGB BLD-MCNC: 14 G/DL (ref 11.5–15.4)
IRON SATN MFR SERPL: 14 % (ref 15–50)
IRON SERPL-MCNC: 46 UG/DL (ref 50–212)
MCH RBC QN AUTO: 30.3 PG (ref 26.8–34.3)
MCHC RBC AUTO-ENTMCNC: 32.8 G/DL (ref 31.4–37.4)
MCV RBC AUTO: 92 FL (ref 82–98)
PLATELET # BLD AUTO: 394 THOUSANDS/UL (ref 149–390)
PMV BLD AUTO: 9 FL (ref 8.9–12.7)
POTASSIUM SERPL-SCNC: 4.2 MMOL/L (ref 3.5–5.3)
PROT SERPL-MCNC: 7.9 G/DL (ref 6.4–8.4)
RBC # BLD AUTO: 4.62 MILLION/UL (ref 3.81–5.12)
SODIUM SERPL-SCNC: 137 MMOL/L (ref 135–147)
TIBC SERPL-MCNC: 326 UG/DL (ref 250–450)
TSH SERPL DL<=0.05 MIU/L-ACNC: 0.52 UIU/ML (ref 0.45–4.5)
UIBC SERPL-MCNC: 280 UG/DL (ref 155–355)
WBC # BLD AUTO: 10.28 THOUSAND/UL (ref 4.31–10.16)

## 2023-12-18 PROCEDURE — 83540 ASSAY OF IRON: CPT

## 2023-12-18 PROCEDURE — 82728 ASSAY OF FERRITIN: CPT

## 2023-12-18 PROCEDURE — 36415 COLL VENOUS BLD VENIPUNCTURE: CPT

## 2023-12-18 PROCEDURE — 80053 COMPREHEN METABOLIC PANEL: CPT

## 2023-12-18 PROCEDURE — 82306 VITAMIN D 25 HYDROXY: CPT

## 2023-12-18 PROCEDURE — 85027 COMPLETE CBC AUTOMATED: CPT

## 2023-12-18 PROCEDURE — 83550 IRON BINDING TEST: CPT

## 2023-12-18 PROCEDURE — 86803 HEPATITIS C AB TEST: CPT

## 2023-12-18 PROCEDURE — 86140 C-REACTIVE PROTEIN: CPT

## 2023-12-18 PROCEDURE — 86480 TB TEST CELL IMMUN MEASURE: CPT

## 2023-12-18 PROCEDURE — 86704 HEP B CORE ANTIBODY TOTAL: CPT

## 2023-12-18 PROCEDURE — 84443 ASSAY THYROID STIM HORMONE: CPT

## 2023-12-18 PROCEDURE — 86708 HEPATITIS A ANTIBODY: CPT

## 2023-12-18 PROCEDURE — 86706 HEP B SURFACE ANTIBODY: CPT

## 2023-12-18 PROCEDURE — 87340 HEPATITIS B SURFACE AG IA: CPT

## 2023-12-19 DIAGNOSIS — E55.9 VITAMIN D DEFICIENCY: ICD-10-CM

## 2023-12-19 DIAGNOSIS — K51.319 ULCERATIVE RECTOSIGMOIDITIS WITH COMPLICATION (HCC): Primary | ICD-10-CM

## 2023-12-19 DIAGNOSIS — E61.1 IRON DEFICIENCY: ICD-10-CM

## 2023-12-19 LAB
GAMMA INTERFERON BACKGROUND BLD IA-ACNC: 0.01 IU/ML
M TB IFN-G BLD-IMP: NEGATIVE
M TB IFN-G CD4+ BCKGRND COR BLD-ACNC: 0.01 IU/ML
M TB IFN-G CD4+ BCKGRND COR BLD-ACNC: 0.02 IU/ML
MITOGEN IGNF BCKGRD COR BLD-ACNC: 2.04 IU/ML

## 2023-12-19 RX ORDER — FERROUS SULFATE 324(65)MG
324 TABLET, DELAYED RELEASE (ENTERIC COATED) ORAL
Qty: 90 TABLET | Refills: 1 | Status: SHIPPED | OUTPATIENT
Start: 2023-12-19

## 2023-12-20 RX ORDER — UPADACITINIB 30 MG/1
30 TABLET, EXTENDED RELEASE ORAL DAILY
Qty: 90 TABLET | Refills: 2 | Status: SHIPPED | OUTPATIENT
Start: 2023-12-20

## 2023-12-28 DIAGNOSIS — E66.01 MORBID OBESITY (HCC): ICD-10-CM

## 2023-12-28 RX ORDER — SEMAGLUTIDE 1 MG/.5ML
INJECTION, SOLUTION SUBCUTANEOUS
Qty: 2 ML | Refills: 0 | Status: SHIPPED | OUTPATIENT
Start: 2023-12-28 | End: 2024-01-02

## 2023-12-29 ENCOUNTER — TELEPHONE (OUTPATIENT)
Dept: FAMILY MEDICINE CLINIC | Facility: CLINIC | Age: 23
End: 2023-12-29

## 2023-12-29 ENCOUNTER — NURSE TRIAGE (OUTPATIENT)
Age: 23
End: 2023-12-29

## 2023-12-29 ENCOUNTER — OFFICE VISIT (OUTPATIENT)
Dept: URGENT CARE | Age: 23
End: 2023-12-29
Payer: MEDICARE

## 2023-12-29 VITALS
HEART RATE: 97 BPM | SYSTOLIC BLOOD PRESSURE: 150 MMHG | RESPIRATION RATE: 18 BRPM | TEMPERATURE: 98.7 F | DIASTOLIC BLOOD PRESSURE: 90 MMHG | OXYGEN SATURATION: 99 %

## 2023-12-29 DIAGNOSIS — N30.01 ACUTE CYSTITIS WITH HEMATURIA: Primary | ICD-10-CM

## 2023-12-29 LAB
SL AMB  POCT GLUCOSE, UA: NEGATIVE
SL AMB LEUKOCYTE ESTERASE,UA: NEGATIVE
SL AMB POCT BILIRUBIN,UA: ABNORMAL
SL AMB POCT BLOOD,UA: ABNORMAL
SL AMB POCT CLARITY,UA: CLEAR
SL AMB POCT COLOR,UA: YELLOW
SL AMB POCT KETONES,UA: NEGATIVE
SL AMB POCT NITRITE,UA: POSITIVE
SL AMB POCT PH,UA: 6
SL AMB POCT SPECIFIC GRAVITY,UA: 1
SL AMB POCT URINE PROTEIN: NEGATIVE
SL AMB POCT UROBILINOGEN: 0.2

## 2023-12-29 PROCEDURE — 81002 URINALYSIS NONAUTO W/O SCOPE: CPT

## 2023-12-29 PROCEDURE — 99213 OFFICE O/P EST LOW 20 MIN: CPT

## 2023-12-29 RX ORDER — NITROFURANTOIN 25; 75 MG/1; MG/1
100 CAPSULE ORAL 2 TIMES DAILY
Qty: 10 CAPSULE | Refills: 0 | Status: SHIPPED | OUTPATIENT
Start: 2023-12-29 | End: 2024-01-03

## 2023-12-29 RX ORDER — PHENAZOPYRIDINE HYDROCHLORIDE 200 MG/1
200 TABLET, FILM COATED ORAL
Qty: 10 TABLET | Refills: 0 | Status: SHIPPED | OUTPATIENT
Start: 2023-12-29

## 2023-12-29 NOTE — TELEPHONE ENCOUNTER
"Per 12/19/23 result note Vit D and iron supplements were to be ordered. Please review and advise.      Reason for Disposition   Information only question and nurse able to answer    Answer Assessment - Initial Assessment Questions  1. REASON FOR CALL or QUESTION: \"What is your reason for calling today?\" or \"How can I best help you?\" or \"What question do you have that I can help answer?\"      Patient questioning supplement order as noted on result note.    Protocols used: Information Only Call - No Triage-ADULT-[place]    "

## 2023-12-29 NOTE — TELEPHONE ENCOUNTER
----- Message from Kim Munoz sent at 12/29/2023 10:51 AM EST -----  Pt calling re: Dr. Montoya sent her a message a few days ago stating her Iron and Vitamin D are low and he is going to order her some supplements. Pt asking if these supplements have been ordered and would like a call back.

## 2023-12-29 NOTE — TELEPHONE ENCOUNTER
Patient went to urgent care for uti and patient states that urgent care told her that she should reach out to PCP to see if they could order a scan of kidney since patient had crystals in urine culture back in January. Please advise.

## 2023-12-29 NOTE — PROGRESS NOTES
[facility]        NAME: Pamella Stein is a 23 y.o. female  : 2000    MRN: [id number]  DATE: 2023  TIME: 11:19 AM    Assessment and Plan   Acute cystitis with hematuria [N30.01]  1. Acute cystitis with hematuria  POCT urine dip    nitrofurantoin (MACROBID) 100 mg capsule    phenazopyridine (PYRIDIUM) 200 mg tablet        UTI symptoms- dysuria, urgency, frequency. States has had several UTI in past. In review, previous urine cultures negative- pt unaware. Discussed culture and monitoring for results.  Prior UA noted crystals. Advised possible stones vs UTI and further eval.     Patient Instructions       Follow up with PCP in 3-5 days.  Proceed to  ER if symptoms worsen.    Chief Complaint     Chief Complaint   Patient presents with    Possible UTI     Pt has been having UTI symptoms for 4 days. Pt experiences burning and urgency.          History of Present Illness       UTI symptoms- dysuria, urgency, frequency. States has had several UTI in past. In review, previous urine cultures negative- pt unaware. Discussed culture and monitoring for results.  Prior UA noted crystals. Advised possible stones vs UTI and further eval.         Review of Systems   Review of Systems   Constitutional:  Negative for activity change, appetite change and fever.   Gastrointestinal:  Negative for nausea and vomiting.   Genitourinary:  Positive for dysuria, frequency and urgency. Negative for flank pain.         Current Medications       Current Outpatient Medications:     dicyclomine (BENTYL) 10 mg capsule, Take 1 capsule (10 mg total) by mouth 2 (two) times a day as needed (cramping), Disp: 60 capsule, Rfl: 3    metFORMIN (GLUCOPHAGE-XR) 500 mg 24 hr tablet, Take 1 tablet (500 mg total) by mouth daily with dinner, Disp: 30 tablet, Rfl: 2    Multiple Vitamins-Minerals (HAIR SKIN AND NAILS FORMULA PO), Take by mouth, Disp: , Rfl:     nitrofurantoin (MACROBID) 100 mg capsule, Take 1 capsule (100 mg total)  by mouth 2 (two) times a day for 5 days, Disp: 10 capsule, Rfl: 0    omeprazole (PriLOSEC) 40 MG capsule, Take 1 capsule (40 mg total) by mouth daily (Patient taking differently: Take 40 mg by mouth if needed), Disp: 90 capsule, Rfl: 1    phenazopyridine (PYRIDIUM) 200 mg tablet, Take 1 tablet (200 mg total) by mouth 3 (three) times a day with meals, Disp: 10 tablet, Rfl: 0    Rinvoq 45 MG TB24, Take 1 tablet ( 45 mg) by mouth once daily in the morning., Disp: 28 tablet, Rfl: 10    Wegovy 1 MG/0.5ML, INJECT 0.5 ML (1 MG TOTAL) UNDER THE SKIN ONCE A WEEK, Disp: 2 mL, Rfl: 0    Calcium Carb-Cholecalciferol 600-25 MG-MCG TABS, Take 1 tablet by mouth in the morning (Patient not taking: Reported on 12/29/2023), Disp: 90 tablet, Rfl: 1    ferrous sulfate 324 (65 Fe) mg, Take 1 tablet (324 mg total) by mouth daily before breakfast (Patient not taking: Reported on 12/29/2023), Disp: 90 tablet, Rfl: 1    Upadacitinib ER (Rinvoq) 30 MG TB24, Take 30 mg by mouth in the morning (Patient not taking: Reported on 12/29/2023), Disp: 90 tablet, Rfl: 2    Current Allergies     Allergies as of 12/29/2023 - Reviewed 12/29/2023   Allergen Reaction Noted    Infliximab Hives 03/16/2016    Mushroom extract complex - food allergy Hives 12/11/2023            The following portions of the patient's history were reviewed and updated as appropriate: allergies, current medications, past family history, past medical history, past social history, past surgical history and problem list.     Past Medical History:   Diagnosis Date    Chest pain 10/28/2022    Class 1 obesity due to excess calories without serious comorbidity in adult 7/16/2020    Crohn's disease (HCC)     Dysuria 3/11/2022    Fever 12/8/2021    Frequency of urination 7/11/2022    Hidradenitis suppurativa     thighs    Nasal congestion 6/1/2022    Obesity, Class II, BMI 35-39.9     Sore throat 6/1/2022    TMJ (temporomandibular joint disorder)     last assessed 02/24/2016    Urinary  tract infection     Varicella     vaccine       Past Surgical History:   Procedure Laterality Date    COLONOSCOPY      x several; last one 2/2020    WISDOM TOOTH EXTRACTION         Family History   Problem Relation Age of Onset    Diabetes Mother         Due to underlying condition with foot ulcer.  Type 2 DM without complication.    Diabetes Father     Polycystic ovary syndrome Sister     Lung cancer Maternal Uncle     Stroke Neg Hx     Heart attack Neg Hx     Hypertension Neg Hx     Breast cancer Neg Hx     Colon cancer Neg Hx     Ovarian cancer Neg Hx     Uterine cancer Neg Hx          Medications have been verified.        Objective   /90   Pulse 97   Temp 98.7 °F (37.1 °C) (Tympanic)   Resp 18   LMP 12/07/2023 (Exact Date)   SpO2 99%   Patient's last menstrual period was 12/07/2023 (exact date).       Physical Exam     Physical Exam  Vitals reviewed.   Constitutional:       General: She is not in acute distress.     Appearance: Normal appearance. She is not ill-appearing.   HENT:      Head: Normocephalic and atraumatic.   Eyes:      Extraocular Movements: Extraocular movements intact.      Conjunctiva/sclera: Conjunctivae normal.   Cardiovascular:      Rate and Rhythm: Normal rate and regular rhythm.      Pulses: Normal pulses.      Heart sounds: Normal heart sounds. No murmur heard.  Pulmonary:      Effort: Pulmonary effort is normal. No respiratory distress.      Breath sounds: Normal breath sounds.   Abdominal:      General: Bowel sounds are normal.      Tenderness: There is abdominal tenderness (suprapubic). There is no right CVA tenderness or left CVA tenderness.   Musculoskeletal:      Cervical back: Normal range of motion.   Skin:     General: Skin is warm.   Neurological:      General: No focal deficit present.      Mental Status: She is alert.   Psychiatric:         Mood and Affect: Mood normal.         Behavior: Behavior normal.         Judgment: Judgment normal.

## 2024-01-01 ENCOUNTER — HOSPITAL ENCOUNTER (EMERGENCY)
Facility: HOSPITAL | Age: 24
Discharge: HOME | End: 2024-01-02
Attending: EMERGENCY MEDICINE
Payer: MEDICARE

## 2024-01-01 VITALS
RESPIRATION RATE: 16 BRPM | DIASTOLIC BLOOD PRESSURE: 87 MMHG | SYSTOLIC BLOOD PRESSURE: 145 MMHG | TEMPERATURE: 98.5 F | WEIGHT: 247.3 LBS | HEART RATE: 96 BPM | BODY MASS INDEX: 41.79 KG/M2 | OXYGEN SATURATION: 95 %

## 2024-01-01 DIAGNOSIS — N39.0 UTI (URINARY TRACT INFECTION): Primary | ICD-10-CM

## 2024-01-01 LAB
BACTERIA UR QL AUTO: ABNORMAL /HPF
BASOPHILS # BLD AUTO: 0.07 THOUSANDS/ÂΜL (ref 0–0.1)
BASOPHILS NFR BLD AUTO: 1 % (ref 0–1)
BILIRUB UR QL STRIP: ABNORMAL
CLARITY UR: CLEAR
COLOR UR: ABNORMAL
EOSINOPHIL # BLD AUTO: 0.21 THOUSAND/ÂΜL (ref 0–0.61)
EOSINOPHIL NFR BLD AUTO: 2 % (ref 0–6)
ERYTHROCYTE [DISTWIDTH] IN BLOOD BY AUTOMATED COUNT: 12.3 % (ref 11.6–15.1)
EXT PREGNANCY TEST URINE: NEGATIVE
EXT. CONTROL: NORMAL
GLUCOSE UR STRIP-MCNC: NEGATIVE MG/DL
HCT VFR BLD AUTO: 42.4 % (ref 34.8–46.1)
HGB BLD-MCNC: 13.8 G/DL (ref 11.5–15.4)
HGB UR QL STRIP.AUTO: 25
IMM GRANULOCYTES # BLD AUTO: 0.04 THOUSAND/UL (ref 0–0.2)
IMM GRANULOCYTES NFR BLD AUTO: 0 % (ref 0–2)
KETONES UR STRIP-MCNC: ABNORMAL MG/DL
LEUKOCYTE ESTERASE UR QL STRIP: 25
LYMPHOCYTES # BLD AUTO: 2.72 THOUSANDS/ÂΜL (ref 0.6–4.47)
LYMPHOCYTES NFR BLD AUTO: 21 % (ref 14–44)
MCH RBC QN AUTO: 29.8 PG (ref 26.8–34.3)
MCHC RBC AUTO-ENTMCNC: 32.5 G/DL (ref 31.4–37.4)
MCV RBC AUTO: 92 FL (ref 82–98)
MONOCYTES # BLD AUTO: 0.93 THOUSAND/ÂΜL (ref 0.17–1.22)
MONOCYTES NFR BLD AUTO: 7 % (ref 4–12)
NEUTROPHILS # BLD AUTO: 9.29 THOUSANDS/ÂΜL (ref 1.85–7.62)
NEUTS SEG NFR BLD AUTO: 69 % (ref 43–75)
NITRITE UR QL STRIP: POSITIVE
NON-SQ EPI CELLS URNS QL MICRO: ABNORMAL /HPF
NRBC BLD AUTO-RTO: 0 /100 WBCS
PH UR STRIP.AUTO: 6.5 [PH]
PLATELET # BLD AUTO: 403 THOUSANDS/UL (ref 149–390)
PMV BLD AUTO: 9 FL (ref 8.9–12.7)
PROT UR STRIP-MCNC: ABNORMAL MG/DL
RBC # BLD AUTO: 4.63 MILLION/UL (ref 3.81–5.12)
RBC #/AREA URNS AUTO: ABNORMAL /HPF
SP GR UR STRIP.AUTO: 1.02 (ref 1–1.04)
UROBILINOGEN UA: 8 MG/DL
WBC # BLD AUTO: 13.26 THOUSAND/UL (ref 4.31–10.16)
WBC #/AREA URNS AUTO: ABNORMAL /HPF

## 2024-01-01 PROCEDURE — 81001 URINALYSIS AUTO W/SCOPE: CPT | Performed by: EMERGENCY MEDICINE

## 2024-01-01 PROCEDURE — 96374 THER/PROPH/DIAG INJ IV PUSH: CPT

## 2024-01-01 PROCEDURE — 81025 URINE PREGNANCY TEST: CPT | Performed by: EMERGENCY MEDICINE

## 2024-01-01 PROCEDURE — 80053 COMPREHEN METABOLIC PANEL: CPT | Performed by: PHYSICIAN ASSISTANT

## 2024-01-01 PROCEDURE — 85025 COMPLETE CBC W/AUTO DIFF WBC: CPT | Performed by: PHYSICIAN ASSISTANT

## 2024-01-01 PROCEDURE — 96375 TX/PRO/DX INJ NEW DRUG ADDON: CPT

## 2024-01-01 PROCEDURE — 99284 EMERGENCY DEPT VISIT MOD MDM: CPT

## 2024-01-01 PROCEDURE — 81003 URINALYSIS AUTO W/O SCOPE: CPT | Performed by: EMERGENCY MEDICINE

## 2024-01-01 PROCEDURE — 36415 COLL VENOUS BLD VENIPUNCTURE: CPT | Performed by: PHYSICIAN ASSISTANT

## 2024-01-01 RX ORDER — ONDANSETRON 2 MG/ML
4 INJECTION INTRAMUSCULAR; INTRAVENOUS ONCE
Status: COMPLETED | OUTPATIENT
Start: 2024-01-01 | End: 2024-01-01

## 2024-01-01 RX ORDER — KETOROLAC TROMETHAMINE 30 MG/ML
15 INJECTION, SOLUTION INTRAMUSCULAR; INTRAVENOUS ONCE
Status: COMPLETED | OUTPATIENT
Start: 2024-01-01 | End: 2024-01-01

## 2024-01-01 RX ADMIN — KETOROLAC TROMETHAMINE 15 MG: 30 INJECTION, SOLUTION INTRAMUSCULAR; INTRAVENOUS at 23:45

## 2024-01-01 RX ADMIN — ONDANSETRON 4 MG: 2 INJECTION INTRAMUSCULAR; INTRAVENOUS at 23:44

## 2024-01-02 DIAGNOSIS — E66.01 MORBID OBESITY (HCC): Primary | ICD-10-CM

## 2024-01-02 DIAGNOSIS — E61.1 IRON DEFICIENCY: ICD-10-CM

## 2024-01-02 DIAGNOSIS — K51.319 ULCERATIVE RECTOSIGMOIDITIS WITH COMPLICATION (HCC): ICD-10-CM

## 2024-01-02 DIAGNOSIS — E55.9 VITAMIN D DEFICIENCY: ICD-10-CM

## 2024-01-02 LAB
ALBUMIN SERPL BCP-MCNC: 4.5 G/DL (ref 3.5–5)
ALP SERPL-CCNC: 57 U/L (ref 34–104)
ALT SERPL W P-5'-P-CCNC: 12 U/L (ref 7–52)
ANION GAP SERPL CALCULATED.3IONS-SCNC: 7 MMOL/L
AST SERPL W P-5'-P-CCNC: 13 U/L (ref 13–39)
BILIRUB SERPL-MCNC: 0.55 MG/DL (ref 0.2–1)
BUN SERPL-MCNC: 21 MG/DL (ref 5–25)
CALCIUM SERPL-MCNC: 9.8 MG/DL (ref 8.4–10.2)
CHLORIDE SERPL-SCNC: 102 MMOL/L (ref 96–108)
CO2 SERPL-SCNC: 28 MMOL/L (ref 21–32)
CREAT SERPL-MCNC: 0.88 MG/DL (ref 0.6–1.3)
GFR SERPL CREATININE-BSD FRML MDRD: 91 ML/MIN/1.73SQ M
GLUCOSE SERPL-MCNC: 86 MG/DL (ref 65–140)
POTASSIUM SERPL-SCNC: 4.2 MMOL/L (ref 3.5–5.3)
PROT SERPL-MCNC: 8.2 G/DL (ref 6.4–8.4)
SODIUM SERPL-SCNC: 137 MMOL/L (ref 135–147)

## 2024-01-02 PROCEDURE — 99284 EMERGENCY DEPT VISIT MOD MDM: CPT | Performed by: PHYSICIAN ASSISTANT

## 2024-01-02 RX ORDER — CEFPODOXIME PROXETIL 200 MG/1
200 TABLET, FILM COATED ORAL 2 TIMES DAILY
Qty: 28 TABLET | Refills: 0 | Status: SHIPPED | OUTPATIENT
Start: 2024-01-02 | End: 2024-01-16

## 2024-01-02 RX ORDER — ONDANSETRON 4 MG/1
4 TABLET, ORALLY DISINTEGRATING ORAL EVERY 6 HOURS PRN
Qty: 9 TABLET | Refills: 0 | Status: SHIPPED | OUTPATIENT
Start: 2024-01-02

## 2024-01-02 NOTE — TELEPHONE ENCOUNTER
Patient had multiple visit for possible UTI and her urine culture is negative  Patient has multiple ctscan in 2022 ,2023 no need to repeat  I recommend to be evaluated by GYN to R/O other reason for her symptom as her urine culture is negative to be evaluated

## 2024-01-02 NOTE — DISCHARGE INSTRUCTIONS
Take antibiotics as directed.  Discontinue current antibiotic and begin new antibiotic.  Return for new or worsening complaints.  Follow-up with your primary care doctor.

## 2024-01-02 NOTE — TELEPHONE ENCOUNTER
----- Message from Maci Montiel sent at 1/2/2024  8:37 AM EST -----  Regarding: FW: olena  Contact: [phone]    ----- Message -----  From: Pamella Stein  Sent: 12/29/2023   7:19 PM EST  To: [facility]  Subject: olena                                        Hi, I use the [facility] pharmacy on [address] in [facility]. That pharmacy you sent them to is where my Rinvoq gets delivered from. I don’t use that one

## 2024-01-02 NOTE — ED PROVIDER NOTES
History  Chief Complaint   Patient presents with    Pelvic Pain     Pt reports 1 week of pelvic pain, went to urgent care, and they told her she did not have a bladder infection. Reports burning with urination and urgency. Was given abx anyway by urgent care, but pt reports no relief.     23-year-old female without significant past medical history presents complaining of dysuria and mild hematuria.  Patient states she was seen at the urgent care a few days ago and was told that she does not have a UTI but should take antibiotics anyway.  Patient is currently taking nitrofurantoin as well as Pyridium without relief.  Patient states she is now having bilateral flank pain as well.  Denies chills fevers nausea or vomiting.  Denies vaginal discharge or vaginal complaints complaints.      History provided by:  Patient   used: No        Prior to Admission Medications   Prescriptions Last Dose Informant Patient Reported? Taking?   Calcium Carb-Cholecalciferol 600-25 MG-MCG TABS   No No   Sig: Take 1 tablet by mouth in the morning   Patient not taking: Reported on 12/29/2023   Multiple Vitamins-Minerals (HAIR SKIN AND NAILS FORMULA PO)  Self Yes No   Sig: Take by mouth   Rinvoq 45 MG TB24  Self No No   Sig: Take 1 tablet ( 45 mg) by mouth once daily in the morning.   Upadacitinib ER (Rinvoq) 30 MG TB24   No No   Sig: Take 30 mg by mouth in the morning   Patient not taking: Reported on 12/29/2023   Wegovy 1 MG/0.5ML   No No   Sig: INJECT 0.5 ML (1 MG TOTAL) UNDER THE SKIN ONCE A WEEK   dicyclomine (BENTYL) 10 mg capsule  Self No No   Sig: Take 1 capsule (10 mg total) by mouth 2 (two) times a day as needed (cramping)   ferrous sulfate 324 (65 Fe) mg   No No   Sig: Take 1 tablet (324 mg total) by mouth daily before breakfast   Patient not taking: Reported on 12/29/2023   metFORMIN (GLUCOPHAGE-XR) 500 mg 24 hr tablet  Self No No   Sig: Take 1 tablet (500 mg total) by mouth daily with dinner   nitrofurantoin  (MACROBID) 100 mg capsule   No No   Sig: Take 1 capsule (100 mg total) by mouth 2 (two) times a day for 5 days   omeprazole (PriLOSEC) 40 MG capsule  Self No No   Sig: Take 1 capsule (40 mg total) by mouth daily   Patient taking differently: Take 40 mg by mouth if needed   phenazopyridine (PYRIDIUM) 200 mg tablet   No No   Sig: Take 1 tablet (200 mg total) by mouth 3 (three) times a day with meals      Facility-Administered Medications: None       Past Medical History:   Diagnosis Date    Chest pain 10/28/2022    Class 1 obesity due to excess calories without serious comorbidity in adult 7/16/2020    Crohn's disease (HCC)     Dysuria 3/11/2022    Fever 12/8/2021    Frequency of urination 7/11/2022    Hidradenitis suppurativa     thighs    Nasal congestion 6/1/2022    Obesity, Class II, BMI 35-39.9     Sore throat 6/1/2022    TMJ (temporomandibular joint disorder)     last assessed 02/24/2016    Urinary tract infection     Varicella     vaccine       Past Surgical History:   Procedure Laterality Date    COLONOSCOPY      x several; last one 2/2020    WISDOM TOOTH EXTRACTION         Family History   Problem Relation Age of Onset    Diabetes Mother         Due to underlying condition with foot ulcer.  Type 2 DM without complication.    Diabetes Father     Polycystic ovary syndrome Sister     Lung cancer Maternal Uncle     Stroke Neg Hx     Heart attack Neg Hx     Hypertension Neg Hx     Breast cancer Neg Hx     Colon cancer Neg Hx     Ovarian cancer Neg Hx     Uterine cancer Neg Hx      I have reviewed and agree with the history as documented.    E-Cigarette/Vaping    E-Cigarette Use Never User      E-Cigarette/Vaping Substances    Nicotine No     THC No     CBD No     Flavoring No     Other No     Unknown No      Social History     Tobacco Use    Smoking status: Never     Passive exposure: Never    Smokeless tobacco: Never    Tobacco comments:     hooka smoke   Vaping Use    Vaping status: Never Used   Substance Use  Topics    Alcohol use: No    Drug use: No       Review of Systems   Constitutional: Negative.  Negative for chills and fatigue.   HENT:  Negative for ear pain and sore throat.    Eyes:  Negative for photophobia and redness.   Respiratory:  Negative for apnea, cough and shortness of breath.    Cardiovascular:  Negative for chest pain.   Gastrointestinal:  Negative for abdominal pain, nausea and vomiting.   Genitourinary:  Positive for dysuria and flank pain.   Musculoskeletal:  Negative for arthralgias, neck pain and neck stiffness.   Skin:  Negative for rash.   Neurological:  Negative for dizziness, tremors, syncope and weakness.   Psychiatric/Behavioral:  Negative for suicidal ideas.        Physical Exam  Physical Exam  Constitutional:       General: She is not in acute distress.     Appearance: She is well-developed. She is not diaphoretic.   Eyes:      Pupils: Pupils are equal, round, and reactive to light.   Cardiovascular:      Rate and Rhythm: Normal rate and regular rhythm.   Pulmonary:      Effort: Pulmonary effort is normal. No respiratory distress.      Breath sounds: Normal breath sounds.   Abdominal:      General: Bowel sounds are normal. There is no distension.      Palpations: Abdomen is soft.      Tenderness: There is no right CVA tenderness or left CVA tenderness.   Musculoskeletal:         General: Normal range of motion.      Cervical back: Normal range of motion and neck supple.   Skin:     General: Skin is warm and dry.   Neurological:      Mental Status: She is alert and oriented to person, place, and time.         Vital Signs  ED Triage Vitals [01/01/24 2231]   Temperature Pulse Respirations Blood Pressure SpO2   98.5 °F (36.9 °C) 96 16 145/87 95 %      Temp Source Heart Rate Source Patient Position - Orthostatic VS BP Location FiO2 (%)   Oral Monitor Sitting Left arm --      Pain Score       --           Vitals:    01/01/24 2231   BP: 145/87   Pulse: 96   Patient Position - Orthostatic VS:  Sitting         Visual Acuity      ED Medications  Medications   ketorolac (TORADOL) injection 15 mg (15 mg Intravenous Given 1/1/24 2345)   ondansetron (ZOFRAN) injection 4 mg (4 mg Intravenous Given 1/1/24 2344)       Diagnostic Studies  Results Reviewed       Procedure Component Value Units Date/Time    Comprehensive metabolic panel [[id number]] Collected: 01/01/24 2335    Lab Status: Final result Specimen: Blood from Arm, Left Updated: 01/02/24 0005     Sodium 137 mmol/L      Potassium 4.2 mmol/L      Chloride 102 mmol/L      CO2 28 mmol/L      ANION GAP 7 mmol/L      BUN 21 mg/dL      Creatinine 0.88 mg/dL      Glucose 86 mg/dL      Calcium 9.8 mg/dL      AST 13 U/L      ALT 12 U/L      Alkaline Phosphatase 57 U/L      Total Protein 8.2 g/dL      Albumin 4.5 g/dL      Total Bilirubin 0.55 mg/dL      eGFR 91 ml/min/1.73sq m     Narrative:      National Kidney Disease Foundation guidelines for Chronic Kidney Disease (CKD):     Stage 1 with normal or high GFR (GFR > 90 mL/min/1.73 square meters)    Stage 2 Mild CKD (GFR = 60-89 mL/min/1.73 square meters)    Stage 3A Moderate CKD (GFR = 45-59 mL/min/1.73 square meters)    Stage 3B Moderate CKD (GFR = 30-44 mL/min/1.73 square meters)    Stage 4 Severe CKD (GFR = 15-29 mL/min/1.73 square meters)    Stage 5 End Stage CKD (GFR <15 mL/min/1.73 square meters)  Note: GFR calculation is accurate only with a steady state creatinine    CBC and differential [[id number]]  (Abnormal) Collected: 01/01/24 2335    Lab Status: Final result Specimen: Blood from Arm, Left Updated: 01/01/24 2345     WBC 13.26 Thousand/uL      RBC 4.63 Million/uL      Hemoglobin 13.8 g/dL      Hematocrit 42.4 %      MCV 92 fL      MCH 29.8 pg      MCHC 32.5 g/dL      RDW 12.3 %      MPV 9.0 fL      Platelets 403 Thousands/uL      nRBC 0 /100 WBCs      Neutrophils Relative 69 %      Immat GRANS % 0 %      Lymphocytes Relative 21 %      Monocytes Relative 7 %      Eosinophils Relative 2 %      Basophils  Relative 1 %      Neutrophils Absolute 9.29 Thousands/µL      Immature Grans Absolute 0.04 Thousand/uL      Lymphocytes Absolute 2.72 Thousands/µL      Monocytes Absolute 0.93 Thousand/µL      Eosinophils Absolute 0.21 Thousand/µL      Basophils Absolute 0.07 Thousands/µL     Urine Microscopic [[id number]]  (Abnormal) Collected: 01/01/24 2235    Lab Status: Final result Specimen: Urine, Other Updated: 01/01/24 2253     RBC, UA 4-10 /hpf      WBC, UA 2-4 /hpf      Epithelial Cells Occasional /hpf      Bacteria, UA Occasional /hpf     UA w Reflex to Microscopic w Reflex to Culture [[id number]]  (Abnormal) Collected: 01/01/24 2235    Lab Status: Final result Specimen: Urine, Other Updated: 01/01/24 2251     Color, UA Orange     Clarity, UA Clear     Specific Gravity, UA 1.020     pH, UA 6.5     Leukocytes, UA 25.0     Nitrite, UA Positive     Protein, UA 15 (Trace) mg/dl      Glucose, UA Negative mg/dl      Ketones, UA 5 (Trace) mg/dl      Bilirubin, UA 6 mg/dL     Occult Blood, UA 25.0     UROBILINOGEN UA 8.0 mg/dL     POCT pregnancy, urine [[id number]]  (Normal) Resulted: 01/01/24 2238    Lab Status: Final result Specimen: Urine Updated: 01/01/24 2238     EXT Preg Test, Ur Negative     Control Valid                   No orders to display              Procedures  Procedures         ED Course                               SBIRT 22yo+      Flowsheet Row Most Recent Value   Initial Alcohol Screen: US AUDIT-C     1. How often do you have a drink containing alcohol? 0 Filed at: 01/01/2024 2231   2. How many drinks containing alcohol do you have on a typical day you are drinking?  0 Filed at: 01/01/2024 2231   3a. Male UNDER 65: How often do you have five or more drinks on one occasion? 0 Filed at: 01/01/2024 2231   3b. FEMALE Any Age, or MALE 65+: How often do you have 4 or more drinks on one occassion? 0 Filed at: 01/01/2024 2231   Audit-C Score 0 Filed at: 01/01/2024 2231   MENDEZ: How many times in the past year have you...     Used an illegal drug or used a prescription medication for non-medical reasons? Never Filed at: 01/01/2024 2231                      Medical Decision Making  Patient had evidence of urinary tract infection on exam in the emergency department.  Afebrile.  Patient admitted bilateral flank pain however did not have specific CVA tenderness on exam.  Possible concern for clinical Khris.  No clear role for imaging at this time.  Started on antibiotics for 14 days.  Continued Pyridium.  Educated on supportive care.  Given strict return precautions.  Discharged home.    Amount and/or Complexity of Data Reviewed  Labs: ordered.    Risk  Prescription drug management.             Disposition  Final diagnoses:   UTI (urinary tract infection)     Time reflects when diagnosis was documented in both MDM as applicable and the Disposition within this note       Time User Action Codes Description Comment    1/2/2024 12:23 AM Gay Conway Add [N39.0] UTI (urinary tract infection)           ED Disposition       ED Disposition   Discharge    Condition   Stable    Date/Time   Tue Jan 2, 2024 0022    Comment   Pamella Stein discharge to home/self care.                   Follow-up Information       Follow up With Specialties Details Why Contact Info Additional Information    [facility] Emergency Department Emergency Medicine Go to  If symptoms worsen [address] [address] [phone] [facility] Emergency Department    Melchor Epstein MD Family Medicine Call  As needed [address]  [address]  [place] [address]  [phone]               Discharge Medication List as of 1/2/2024 12:30 AM        START taking these medications    Details   cefpodoxime (VANTIN) 200 mg tablet Take 1 tablet (200 mg total) by mouth 2 (two) times a day for 14 days, Starting Tue 1/2/2024, Until Tue 1/16/2024, Normal      ondansetron (Zofran ODT) 4 mg disintegrating tablet Take 1  tablet (4 mg total) by mouth every 6 (six) hours as needed for nausea or vomiting, Starting Tue 1/2/2024, Normal           CONTINUE these medications which have NOT CHANGED    Details   Calcium Carb-Cholecalciferol 600-25 MG-MCG TABS Take 1 tablet by mouth in the morning, Starting Tue 12/19/2023, Normal      dicyclomine (BENTYL) 10 mg capsule Take 1 capsule (10 mg total) by mouth 2 (two) times a day as needed (cramping), Starting Mon 12/11/2023, Normal      ferrous sulfate 324 (65 Fe) mg Take 1 tablet (324 mg total) by mouth daily before breakfast, Starting Tue 12/19/2023, Normal      metFORMIN (GLUCOPHAGE-XR) 500 mg 24 hr tablet Take 1 tablet (500 mg total) by mouth daily with dinner, Starting Wed 12/13/2023, Normal      Multiple Vitamins-Minerals (HAIR SKIN AND NAILS FORMULA PO) Take by mouth, Historical Med      nitrofurantoin (MACROBID) 100 mg capsule Take 1 capsule (100 mg total) by mouth 2 (two) times a day for 5 days, Starting Fri 12/29/2023, Until Wed 1/3/2024, Normal      omeprazole (PriLOSEC) 40 MG capsule Take 1 capsule (40 mg total) by mouth daily, Starting Mon 12/11/2023, Normal      phenazopyridine (PYRIDIUM) 200 mg tablet Take 1 tablet (200 mg total) by mouth 3 (three) times a day with meals, Starting Fri 12/29/2023, Normal      !! Rinvoq 45 MG TB24 Take 1 tablet ( 45 mg) by mouth once daily in the morning., Normal      !! Upadacitinib ER (Rinvoq) 30 MG TB24 Take 30 mg by mouth in the morning, Starting Wed 12/20/2023, Normal      Wegovy 1 MG/0.5ML INJECT 0.5 ML (1 MG TOTAL) UNDER THE SKIN ONCE A WEEK, Normal       !! - Potential duplicate medications found. Please discuss with provider.          No discharge procedures on file.    PDMP Review       None            ED Provider  Electronically Signed by             Gay Conway PA-C  01/02/24 0137

## 2024-01-03 RX ORDER — FERROUS SULFATE 324(65)MG
324 TABLET, DELAYED RELEASE (ENTERIC COATED) ORAL
Qty: 90 TABLET | Refills: 1 | Status: SHIPPED | OUTPATIENT
Start: 2024-01-03

## 2024-01-04 ENCOUNTER — NURSE TRIAGE (OUTPATIENT)
Age: 24
End: 2024-01-04

## 2024-01-04 DIAGNOSIS — E55.9 VITAMIN D DEFICIENCY: Primary | ICD-10-CM

## 2024-01-04 NOTE — TELEPHONE ENCOUNTER
"Calcium Carb-Cholecalciferol 600-25 MG-MCG unable to be ordered by pharmacy, pharmacist can order the Vit D Seperate from Calcium but would need a new script for the Vit D 25 mcg and Calcium 600 mg , please advise, pt. Is aware and is ok with taking 2 separate pills           Reason for Disposition   Pharmacy calling with prescription question and triager unable to answer question    Answer Assessment - Initial Assessment Questions  1. NAME of MEDICATION: \"What medicine are you calling about?\"        Calcium Carb-Cholecalciferol 600-25 MG-MCG unable to be ordered by pharmacy, pharmacist can order the Vit D Seperate from Calcium but would need a new script for the Vit D 25 mcg and Calcium 600 mg , please advise, pt. Is aware and is ok with taking 2 separate pills    Protocols used: Medication Question Call-ADULT-[place]    "

## 2024-01-05 RX ORDER — MELATONIN
1000 DAILY
Qty: 90 TABLET | Refills: 1 | Status: SHIPPED | OUTPATIENT
Start: 2024-01-05

## 2024-01-25 DIAGNOSIS — R73.01 IFG (IMPAIRED FASTING GLUCOSE): ICD-10-CM

## 2024-01-25 DIAGNOSIS — E66.01 MORBID OBESITY (HCC): ICD-10-CM

## 2024-01-25 RX ORDER — METFORMIN HYDROCHLORIDE 500 MG/1
500 TABLET, EXTENDED RELEASE ORAL
Qty: 30 TABLET | Refills: 2 | Status: SHIPPED | OUTPATIENT
Start: 2024-01-25

## 2024-01-25 RX ORDER — SEMAGLUTIDE 1.7 MG/.75ML
INJECTION, SOLUTION SUBCUTANEOUS
Qty: 3 ML | Refills: 0 | Status: SHIPPED | OUTPATIENT
Start: 2024-01-25 | End: 2024-01-31 | Stop reason: DRUGHIGH

## 2024-01-25 NOTE — PROGRESS NOTES
Assessment        Diagnoses and all orders for this visit:    Encntr for gyn exam (general) (routine) w/o abn findings    Cervical cancer screening  -     Cancel: Liquid-based pap, screening  -     Ambulatory Referral to Gynecology    Screen for STD (sexually transmitted disease)  -     Cancel: Chlamydia/GC amplified DNA by PCR  -     Cancel: Trichomonas vaginalis Thin prep  -     HIV 1/2 AG/AB w Reflex [facility] for 2 yr old and above; Future  -     RPR-Syphilis Screening (Total Syphilis IGG/IGM); Future  -     Chlamydia/GC amplified DNA by PCR; Future  -     Trichomonas vaginalis/Mycoplasma genitalium PCR; Future    Unprotected sexual intercourse  -     POCT urine HCG  -     Cancel: Trichomonas vaginalis Thin prep  -     HIV 1/2 AG/AB w Reflex [facility] for 2 yr old and above; Future  -     RPR-Syphilis Screening (Total Syphilis IGG/IGM); Future  -     Trichomonas vaginalis/Mycoplasma genitalium PCR; Future    Hirsutism  -     US pelvis complete w transvaginal; Future  -     DHEA-sulfate; Future  -     Estradiol; Future  -     Follicle stimulating hormone; Future  -     TSH, 3rd generation with Free T4 reflex; Future  -     Testosterone, free, total; Future  -     Prolactin; Future    Anxiety  -     ALPRAZolam (XANAX) 0.5 mg tablet; Take 1 tablet (0.5 mg total) by mouth once as needed for anxiety for up to 1 dose             Plan      All questions answered.  Follow up in 1 year.  Follow up as needed.   On immunosuprpessive therapy - pap annually  then q3 years there after  HIV and RPR  Hep B and Hep C negative  GC/CT/trichomonas due to unprotected intercourse -unable to complete pelvic exam due to extreme anxiety and discomfort.  Advised Xanax to take prior to exam and reattempted collection of Pap  Patient request to be evaluated for PCOS due to hirsutism.  She also has hidradenitis suppurativa.  Advised labs and ultrasound to complete.        Subjective      Pamella Stein is a 23 y.o. female who presents for annual  exam.      Chief Complaint   Patient presents with    [facility]     New pt annual exam; last PAP 20 negative; pt states that she is on rinvoq, and was advised that it increases risk of cervical cancer     On RINVOQ for Crohn's colitis since 2023.  She is having bad GI symptoms - diarrhea with cramping and nausea  She is is not sexually active but was sexually active 3 weeks ago, unprotected.  She is unsure if she need a pregnancy test.        Last Pap: 2020      HPV vaccine completed:yes - 3 doses  Current contraception: none  History of abnormal Pap smear: no  History of abnormal mammogram: no  Family history of uterine or ovarian cancer: no  Family history of breast cancer: no  Family history of colon cancer: no    OB History    Para Term  AB Living   0 0 0 0 0 0   SAB IAB Ectopic Multiple Live Births   0 0 0 0 0   Obstetric Comments   Menarche 11       Menstrual History:  OB History          0    Para   0    Term   0       0    AB   0    Living   0         SAB   0    IAB   0    Ectopic   0    Multiple   0    Live Births   0           Obstetric Comments   Menarche 11              Menarche age: 11  Patient's last menstrual period was 2024 (exact date).             Past Medical History:   Diagnosis Date    Chest pain 10/28/2022    Class 1 obesity due to excess calories without serious comorbidity in adult 2020    Crohn's disease (HCC)     Dysuria 3/11/2022    Fever 2021    Frequency of urination 2022    Hidradenitis suppurativa     thighs    Nasal congestion 2022    Obesity, Class II, BMI 35-39.9     Sore throat 2022    TMJ (temporomandibular joint disorder)     last assessed 2016    Urinary tract infection     Varicella     vaccine     Past Surgical History:   Procedure Laterality Date    COLONOSCOPY      x several; last one 2020    WISDOM TOOTH EXTRACTION       Family History   Problem Relation Age of Onset    Diabetes Mother          Due to underlying condition with foot ulcer.  Type 2 DM without complication.    Diabetes Father     Polycystic ovary syndrome Sister     Lung cancer Maternal Uncle     Stroke Neg Hx     Heart attack Neg Hx     Hypertension Neg Hx     Breast cancer Neg Hx     Colon cancer Neg Hx     Ovarian cancer Neg Hx     Uterine cancer Neg Hx        Social History     Tobacco Use    Smoking status: Never     Passive exposure: Never    Smokeless tobacco: Never    Tobacco comments:     hooka smoke   Vaping Use    Vaping status: Never Used   Substance Use Topics    Alcohol use: Yes     Comment: occasional    Drug use: No          Current Outpatient Medications:     ALPRAZolam (XANAX) 0.5 mg tablet, Take 1 tablet (0.5 mg total) by mouth once as needed for anxiety for up to 1 dose, Disp: 1 tablet, Rfl: 0    Calcium Carb-Cholecalciferol 600-25 MG-MCG TABS, Take 1 tablet by mouth in the morning, Disp: 90 tablet, Rfl: 1    cholecalciferol (VITAMIN D3) 1,000 units tablet, Take 1 tablet (1,000 Units total) by mouth daily, Disp: 90 tablet, Rfl: 1    dicyclomine (BENTYL) 10 mg capsule, Take 1 capsule (10 mg total) by mouth 2 (two) times a day as needed (cramping), Disp: 60 capsule, Rfl: 3    ferrous sulfate 324 (65 Fe) mg, Take 1 tablet (324 mg total) by mouth daily before breakfast, Disp: 90 tablet, Rfl: 1    metFORMIN (GLUCOPHAGE-XR) 500 mg 24 hr tablet, TAKE 1 TABLET (500 MG TOTAL) BY MOUTH DAILY WITH DINNER, Disp: 30 tablet, Rfl: 2    Multiple Vitamins-Minerals (HAIR SKIN AND NAILS FORMULA PO), Take by mouth, Disp: , Rfl:     omeprazole (PriLOSEC) 40 MG capsule, Take 1 capsule (40 mg total) by mouth daily (Patient taking differently: Take 40 mg by mouth if needed), Disp: 90 capsule, Rfl: 1    ondansetron (Zofran ODT) 4 mg disintegrating tablet, Take 1 tablet (4 mg total) by mouth every 6 (six) hours as needed for nausea or vomiting, Disp: 9 tablet, Rfl: 0    Upadacitinib ER (Rinvoq) 30 MG TB24, Take 30 mg by mouth in the morning,  "Disp: 90 tablet, Rfl: 2    Wegovy 1.7 MG/0.75ML, INJECT 0.75 ML (1.7 MG TOTAL) UNDER THE SKIN ONCE A WEEK, Disp: 3 mL, Rfl: 0    phenazopyridine (PYRIDIUM) 200 mg tablet, Take 1 tablet (200 mg total) by mouth 3 (three) times a day with meals (Patient not taking: Reported on 1/29/2024), Disp: 10 tablet, Rfl: 0    Rinvoq 45 MG TB24, Take 1 tablet ( 45 mg) by mouth once daily in the morning. (Patient not taking: Reported on 1/29/2024), Disp: 28 tablet, Rfl: 10    Allergies   Allergen Reactions    Infliximab Hives     Difficulty breathing as well     Mushroom Extract Complex - Food Allergy Hives     Breaks out hives, only when she eats it.           Review of Systems   Constitutional: Negative.    HENT: Negative.     Eyes: Negative.    Respiratory: Negative.     Cardiovascular: Negative.    Gastrointestinal: Negative.    Endocrine: Negative.    Genitourinary:         As noted in HPI   Musculoskeletal: Negative.    Skin: Negative.    Allergic/Immunologic: Negative.    Neurological: Negative.    Hematological: Negative.    Psychiatric/Behavioral: Negative.         /70 (BP Location: Right arm, Patient Position: Sitting, Cuff Size: Adult)   Pulse 96   Temp 97.8 °F (36.6 °C) (Tympanic)   Ht 5' 4.5\" (1.638 m)   Wt 112 kg (246 lb)   LMP 01/11/2024 (Exact Date)   BMI 41.57 kg/m²         Physical Exam  Constitutional:       Appearance: She is well-developed.   Genitourinary:      Vulva, bladder and rectum normal.      Right Labia: No rash, tenderness, lesions, skin changes or Bartholin's cyst.     Left Labia: No tenderness, lesions, skin changes, Bartholin's cyst or rash.     No inguinal adenopathy present in the right or left side.     Pelvic exam was performed with patient in the lithotomy position.   Rectum:      No external hemorrhoid.   Breasts:     Right: No mass, nipple discharge, skin change or tenderness.      Left: No mass, nipple discharge, skin change or tenderness.   HENT:      Head: Normocephalic.      " Nose: Nose normal.   Eyes:      Conjunctiva/sclera: Conjunctivae normal.   Neck:      Thyroid: No thyromegaly.   Cardiovascular:      Rate and Rhythm: Normal rate and regular rhythm.      Heart sounds: Normal heart sounds. No murmur heard.  Pulmonary:      Effort: Pulmonary effort is normal. No respiratory distress.      Breath sounds: Normal breath sounds. No wheezing or rales.   Abdominal:      General: There is no distension.      Palpations: Abdomen is soft. There is no mass.      Tenderness: There is no abdominal tenderness. There is no guarding or rebound.   Musculoskeletal:         General: No tenderness.      Cervical back: Neck supple. No muscular tenderness.   Lymphadenopathy:      Cervical: No cervical adenopathy.      Lower Body: No right inguinal adenopathy. No left inguinal adenopathy.   Neurological:      Mental Status: She is alert and oriented to person, place, and time.   Skin:     General: Skin is warm and dry.   Psychiatric:         Mood and Affect: Mood normal.         Behavior: Behavior normal.       Attempted to insert speculum, patient extremely uncomfortable.  Small speculum was used and still no visualization was able to be done and so Pap smear was not performed      Future Appointments   Date Time Provider Department Center   3/7/2024  8:00 AM Radha Montoya MD GI [facility]   3/15/2024 11:30 AM Melchor Epstein MD [facility]   12/16/2024 11:00 AM Melchor Epstein MD [facility]

## 2024-01-29 ENCOUNTER — PATIENT MESSAGE (OUTPATIENT)
Dept: GASTROENTEROLOGY | Facility: CLINIC | Age: 24
End: 2024-01-29

## 2024-01-29 ENCOUNTER — OFFICE VISIT (OUTPATIENT)
Dept: OBGYN CLINIC | Facility: CLINIC | Age: 24
End: 2024-01-29
Payer: MEDICARE

## 2024-01-29 VITALS
DIASTOLIC BLOOD PRESSURE: 70 MMHG | TEMPERATURE: 97.8 F | WEIGHT: 246 LBS | HEART RATE: 96 BPM | SYSTOLIC BLOOD PRESSURE: 114 MMHG | BODY MASS INDEX: 40.98 KG/M2 | HEIGHT: 65 IN

## 2024-01-29 DIAGNOSIS — Z72.51 UNPROTECTED SEXUAL INTERCOURSE: ICD-10-CM

## 2024-01-29 DIAGNOSIS — F41.9 ANXIETY: ICD-10-CM

## 2024-01-29 DIAGNOSIS — R19.7 DIARRHEA OF PRESUMED INFECTIOUS ORIGIN: ICD-10-CM

## 2024-01-29 DIAGNOSIS — K50.10 CROHN'S DISEASE OF LARGE INTESTINE WITHOUT COMPLICATION (HCC): ICD-10-CM

## 2024-01-29 DIAGNOSIS — Z01.419 ENCNTR FOR GYN EXAM (GENERAL) (ROUTINE) W/O ABN FINDINGS: Primary | ICD-10-CM

## 2024-01-29 DIAGNOSIS — Z11.3 SCREEN FOR STD (SEXUALLY TRANSMITTED DISEASE): ICD-10-CM

## 2024-01-29 DIAGNOSIS — K51.319 ULCERATIVE RECTOSIGMOIDITIS WITH COMPLICATION (HCC): Primary | ICD-10-CM

## 2024-01-29 DIAGNOSIS — Z12.4 CERVICAL CANCER SCREENING: ICD-10-CM

## 2024-01-29 DIAGNOSIS — L68.0 HIRSUTISM: ICD-10-CM

## 2024-01-29 LAB — SL AMB POCT URINE HCG: NEGATIVE

## 2024-01-29 PROCEDURE — 99385 PREV VISIT NEW AGE 18-39: CPT | Performed by: OBSTETRICS & GYNECOLOGY

## 2024-01-29 PROCEDURE — 81025 URINE PREGNANCY TEST: CPT | Performed by: OBSTETRICS & GYNECOLOGY

## 2024-01-29 RX ORDER — ALPRAZOLAM 0.5 MG/1
0.5 TABLET ORAL ONCE AS NEEDED
Qty: 1 TABLET | Refills: 0 | Status: SHIPPED | OUTPATIENT
Start: 2024-01-29

## 2024-01-31 DIAGNOSIS — E66.01 MORBID OBESITY (HCC): Primary | ICD-10-CM

## 2024-01-31 DIAGNOSIS — R73.01 IFG (IMPAIRED FASTING GLUCOSE): ICD-10-CM

## 2024-02-12 DIAGNOSIS — N39.0 UTI (URINARY TRACT INFECTION): ICD-10-CM

## 2024-02-12 DIAGNOSIS — R11.0 NAUSEA: Primary | ICD-10-CM

## 2024-02-13 RX ORDER — ONDANSETRON 4 MG/1
4 TABLET, ORALLY DISINTEGRATING ORAL EVERY 8 HOURS PRN
Qty: 20 TABLET | Refills: 0 | Status: SHIPPED | OUTPATIENT
Start: 2024-02-13

## 2024-02-15 PROBLEM — Z12.4 CERVICAL CANCER SCREENING: Status: RESOLVED | Noted: 2023-12-15 | Resolved: 2024-02-15

## 2024-02-16 ENCOUNTER — APPOINTMENT (OUTPATIENT)
Dept: LAB | Facility: HOSPITAL | Age: 24
End: 2024-02-16
Payer: MEDICARE

## 2024-02-16 DIAGNOSIS — Z72.51 UNPROTECTED SEXUAL INTERCOURSE: ICD-10-CM

## 2024-02-16 DIAGNOSIS — L68.0 HIRSUTISM: ICD-10-CM

## 2024-02-16 DIAGNOSIS — K51.319 ULCERATIVE RECTOSIGMOIDITIS WITH COMPLICATION (HCC): ICD-10-CM

## 2024-02-16 DIAGNOSIS — Z11.3 SCREEN FOR STD (SEXUALLY TRANSMITTED DISEASE): ICD-10-CM

## 2024-02-16 DIAGNOSIS — R19.7 DIARRHEA OF PRESUMED INFECTIOUS ORIGIN: ICD-10-CM

## 2024-02-16 DIAGNOSIS — K50.10 CROHN'S DISEASE OF LARGE INTESTINE WITHOUT COMPLICATION (HCC): ICD-10-CM

## 2024-02-16 LAB
ALBUMIN SERPL BCP-MCNC: 4.4 G/DL (ref 3.5–5)
ALP SERPL-CCNC: 50 U/L (ref 34–104)
ALT SERPL W P-5'-P-CCNC: 14 U/L (ref 7–52)
ANION GAP SERPL CALCULATED.3IONS-SCNC: 8 MMOL/L
AST SERPL W P-5'-P-CCNC: 14 U/L (ref 13–39)
BASOPHILS # BLD AUTO: 0.08 THOUSANDS/ÂΜL (ref 0–0.1)
BASOPHILS NFR BLD AUTO: 1 % (ref 0–1)
BILIRUB SERPL-MCNC: 0.63 MG/DL (ref 0.2–1)
BUN SERPL-MCNC: 13 MG/DL (ref 5–25)
CALCIUM SERPL-MCNC: 9.5 MG/DL (ref 8.4–10.2)
CHLORIDE SERPL-SCNC: 102 MMOL/L (ref 96–108)
CO2 SERPL-SCNC: 29 MMOL/L (ref 21–32)
CREAT SERPL-MCNC: 0.89 MG/DL (ref 0.6–1.3)
CRP SERPL QL: 14.5 MG/L
EOSINOPHIL # BLD AUTO: 0.26 THOUSAND/ÂΜL (ref 0–0.61)
EOSINOPHIL NFR BLD AUTO: 3 % (ref 0–6)
ERYTHROCYTE [DISTWIDTH] IN BLOOD BY AUTOMATED COUNT: 12.4 % (ref 11.6–15.1)
ESTRADIOL SERPL-MCNC: 48.7 PG/ML
FSH SERPL-ACNC: 5.1 MIU/ML
GFR SERPL CREATININE-BSD FRML MDRD: 90 ML/MIN/1.73SQ M
GLUCOSE P FAST SERPL-MCNC: 83 MG/DL (ref 65–99)
HCT VFR BLD AUTO: 40.1 % (ref 34.8–46.1)
HGB BLD-MCNC: 13.1 G/DL (ref 11.5–15.4)
HIV 1+2 AB+HIV1 P24 AG SERPL QL IA: NORMAL
HIV 2 AB SERPL QL IA: NORMAL
HIV1 AB SERPL QL IA: NORMAL
HIV1 P24 AG SERPL QL IA: NORMAL
IMM GRANULOCYTES # BLD AUTO: 0.03 THOUSAND/UL (ref 0–0.2)
IMM GRANULOCYTES NFR BLD AUTO: 0 % (ref 0–2)
LYMPHOCYTES # BLD AUTO: 1.88 THOUSANDS/ÂΜL (ref 0.6–4.47)
LYMPHOCYTES NFR BLD AUTO: 25 % (ref 14–44)
MCH RBC QN AUTO: 30.4 PG (ref 26.8–34.3)
MCHC RBC AUTO-ENTMCNC: 32.7 G/DL (ref 31.4–37.4)
MCV RBC AUTO: 93 FL (ref 82–98)
MONOCYTES # BLD AUTO: 0.61 THOUSAND/ÂΜL (ref 0.17–1.22)
MONOCYTES NFR BLD AUTO: 8 % (ref 4–12)
NEUTROPHILS # BLD AUTO: 4.82 THOUSANDS/ÂΜL (ref 1.85–7.62)
NEUTS SEG NFR BLD AUTO: 63 % (ref 43–75)
NRBC BLD AUTO-RTO: 0 /100 WBCS
PLATELET # BLD AUTO: 423 THOUSANDS/UL (ref 149–390)
PMV BLD AUTO: 9.3 FL (ref 8.9–12.7)
POTASSIUM SERPL-SCNC: 4.1 MMOL/L (ref 3.5–5.3)
PROLACTIN SERPL-MCNC: 8.94 NG/ML (ref 3.34–26.72)
PROT SERPL-MCNC: 8.2 G/DL (ref 6.4–8.4)
RBC # BLD AUTO: 4.31 MILLION/UL (ref 3.81–5.12)
SODIUM SERPL-SCNC: 139 MMOL/L (ref 135–147)
TREPONEMA PALLIDUM IGG+IGM AB [PRESENCE] IN SERUM OR PLASMA BY IMMUNOASSAY: NORMAL
TSH SERPL DL<=0.05 MIU/L-ACNC: 0.95 UIU/ML (ref 0.45–4.5)
WBC # BLD AUTO: 7.68 THOUSAND/UL (ref 4.31–10.16)

## 2024-02-16 PROCEDURE — 82670 ASSAY OF TOTAL ESTRADIOL: CPT

## 2024-02-16 PROCEDURE — 86780 TREPONEMA PALLIDUM: CPT

## 2024-02-16 PROCEDURE — 80053 COMPREHEN METABOLIC PANEL: CPT

## 2024-02-16 PROCEDURE — 87389 HIV-1 AG W/HIV-1&-2 AB AG IA: CPT

## 2024-02-16 PROCEDURE — 84443 ASSAY THYROID STIM HORMONE: CPT

## 2024-02-16 PROCEDURE — 83001 ASSAY OF GONADOTROPIN (FSH): CPT

## 2024-02-16 PROCEDURE — 86140 C-REACTIVE PROTEIN: CPT

## 2024-02-16 PROCEDURE — 84402 ASSAY OF FREE TESTOSTERONE: CPT

## 2024-02-16 PROCEDURE — 36415 COLL VENOUS BLD VENIPUNCTURE: CPT

## 2024-02-16 PROCEDURE — 84403 ASSAY OF TOTAL TESTOSTERONE: CPT

## 2024-02-16 PROCEDURE — 82627 DEHYDROEPIANDROSTERONE: CPT

## 2024-02-16 PROCEDURE — 84146 ASSAY OF PROLACTIN: CPT

## 2024-02-16 PROCEDURE — 85025 COMPLETE CBC W/AUTO DIFF WBC: CPT

## 2024-02-17 LAB
DHEA-S SERPL-MCNC: 259 UG/DL (ref 84.8–378)
TESTOST FREE SERPL-MCNC: 2.4 PG/ML (ref 0–4.2)
TESTOST SERPL-MCNC: 50 NG/DL (ref 13–71)

## 2024-02-19 ENCOUNTER — PATIENT MESSAGE (OUTPATIENT)
Dept: GASTROENTEROLOGY | Facility: CLINIC | Age: 24
End: 2024-02-19

## 2024-02-19 ENCOUNTER — APPOINTMENT (OUTPATIENT)
Dept: LAB | Facility: HOSPITAL | Age: 24
End: 2024-02-19
Payer: MEDICARE

## 2024-02-19 DIAGNOSIS — K50.10 CROHN'S DISEASE OF LARGE INTESTINE WITHOUT COMPLICATION (HCC): ICD-10-CM

## 2024-02-19 DIAGNOSIS — R19.7 DIARRHEA OF PRESUMED INFECTIOUS ORIGIN: ICD-10-CM

## 2024-02-19 DIAGNOSIS — A04.72 C. DIFFICILE COLITIS: Primary | ICD-10-CM

## 2024-02-19 DIAGNOSIS — K51.319 ULCERATIVE RECTOSIGMOIDITIS WITH COMPLICATION (HCC): ICD-10-CM

## 2024-02-19 PROCEDURE — 83993 ASSAY FOR CALPROTECTIN FECAL: CPT

## 2024-02-19 PROCEDURE — 87493 C DIFF AMPLIFIED PROBE: CPT

## 2024-02-20 LAB
C DIFF TOX A+B STL QL IA: NEGATIVE
C DIFF TOX GENS STL QL NAA+PROBE: POSITIVE

## 2024-02-21 RX ORDER — VANCOMYCIN HYDROCHLORIDE 125 MG/1
CAPSULE ORAL 4 TIMES DAILY
Qty: 81 CAPSULE | Refills: 0 | Status: SHIPPED | OUTPATIENT
Start: 2024-02-21 | End: 2024-03-28

## 2024-02-22 LAB — CALPROTECTIN STL-MCNT: 6660 UG/G (ref 0–120)

## 2024-02-23 DIAGNOSIS — R73.01 IFG (IMPAIRED FASTING GLUCOSE): ICD-10-CM

## 2024-02-23 DIAGNOSIS — E66.01 MORBID OBESITY (HCC): ICD-10-CM

## 2024-02-23 RX ORDER — SEMAGLUTIDE 2.4 MG/.75ML
2.4 INJECTION, SOLUTION SUBCUTANEOUS WEEKLY
Qty: 3 ML | Refills: 0 | Status: SHIPPED | OUTPATIENT
Start: 2024-02-23

## 2024-03-15 DIAGNOSIS — E66.01 MORBID OBESITY (HCC): ICD-10-CM

## 2024-03-15 DIAGNOSIS — R73.01 IFG (IMPAIRED FASTING GLUCOSE): ICD-10-CM

## 2024-03-15 RX ORDER — SEMAGLUTIDE 2.4 MG/.75ML
2.4 INJECTION, SOLUTION SUBCUTANEOUS WEEKLY
Qty: 3 ML | Refills: 0 | Status: SHIPPED | OUTPATIENT
Start: 2024-03-15 | End: 2024-03-19

## 2024-03-18 DIAGNOSIS — R73.01 IFG (IMPAIRED FASTING GLUCOSE): ICD-10-CM

## 2024-03-18 DIAGNOSIS — E66.01 MORBID OBESITY (HCC): ICD-10-CM

## 2024-03-19 RX ORDER — SEMAGLUTIDE 2.4 MG/.75ML
2.4 INJECTION, SOLUTION SUBCUTANEOUS WEEKLY
Qty: 3 ML | Refills: 0 | Status: SHIPPED | OUTPATIENT
Start: 2024-03-19

## 2024-03-21 ENCOUNTER — OFFICE VISIT (OUTPATIENT)
Dept: URGENT CARE | Age: 24
End: 2024-03-21
Payer: MEDICARE

## 2024-03-21 VITALS
OXYGEN SATURATION: 99 % | DIASTOLIC BLOOD PRESSURE: 78 MMHG | RESPIRATION RATE: 18 BRPM | SYSTOLIC BLOOD PRESSURE: 128 MMHG | HEART RATE: 103 BPM | TEMPERATURE: 97.3 F

## 2024-03-21 DIAGNOSIS — J02.9 SORE THROAT: Primary | ICD-10-CM

## 2024-03-21 LAB — S PYO AG THROAT QL: NEGATIVE

## 2024-03-21 PROCEDURE — 99213 OFFICE O/P EST LOW 20 MIN: CPT | Performed by: EMERGENCY MEDICINE

## 2024-03-21 PROCEDURE — 87147 CULTURE TYPE IMMUNOLOGIC: CPT

## 2024-03-21 PROCEDURE — 87070 CULTURE OTHR SPECIMN AEROBIC: CPT

## 2024-03-21 NOTE — PROGRESS NOTES
[facility]        NAME: Pamella Stein is a 23 y.o. female  : 2000    MRN: [id number]  DATE: 2024  TIME: 10:49 AM    Assessment and Plan   Sore throat [J02.9]  1. Sore throat  POCT rapid strepA    Throat culture    Throat culture        Sore throat for 3 days- no recent fevers-  PND and some congestion- wanted to make sure it wasn't strep- rapid strep negative- will send culture.     Patient Instructions       Follow up with PCP ias needed    If tests have been performed at [facility], our office will contact you with results if changes need to be made to the care plan discussed with you at the visit.  [facility]    Chief Complaint     Chief Complaint   Patient presents with    Sore Throat     Severe sore throat for past few days, fever. Started Monday.          History of Present Illness       Sore throat for 3 days- no recent fevers-  PND and some congestion- wanted to make sure it wasn't strep- rapid strep negative- will send culture.     Sore Throat   Associated symptoms include congestion.       Review of Systems   Review of Systems   Constitutional:  Negative for fever.   HENT:  Positive for congestion, postnasal drip and sore throat.    All other systems reviewed and are negative.        Current Medications       Current Outpatient Medications:     ALPRAZolam (XANAX) 0.5 mg tablet, Take 1 tablet (0.5 mg total) by mouth once as needed for anxiety for up to 1 dose, Disp: 1 tablet, Rfl: 0    Calcium Carb-Cholecalciferol 600-25 MG-MCG TABS, Take 1 tablet by mouth in the morning, Disp: 90 tablet, Rfl: 1    cholecalciferol (VITAMIN D3) 1,000 units tablet, Take 1 tablet (1,000 Units total) by mouth daily, Disp: 90 tablet, Rfl: 1    dicyclomine (BENTYL) 10 mg capsule, Take 1 capsule (10 mg total) by mouth 2 (two) times a day as needed (cramping), Disp: 60 capsule, Rfl: 3    ferrous sulfate 324 (65 Fe) mg, Take 1 tablet (324 mg total) by mouth  daily before breakfast, Disp: 90 tablet, Rfl: 1    metFORMIN (GLUCOPHAGE-XR) 500 mg 24 hr tablet, TAKE 1 TABLET (500 MG TOTAL) BY MOUTH DAILY WITH DINNER, Disp: 30 tablet, Rfl: 2    Multiple Vitamins-Minerals (HAIR SKIN AND NAILS FORMULA PO), Take by mouth, Disp: , Rfl:     ondansetron (Zofran ODT) 4 mg disintegrating tablet, Take 1 tablet (4 mg total) by mouth every 8 (eight) hours as needed for nausea or vomiting, Disp: 20 tablet, Rfl: 0    Upadacitinib ER (Rinvoq) 30 MG TB24, Take 30 mg by mouth in the morning, Disp: 90 tablet, Rfl: 2    vancomycin (VANCOCIN) 125 MG capsule, Take 1 capsule (125 mg total) by mouth 4 (four) times a day for 14 days, THEN 1 capsule (125 mg total) 2 (two) times a day for 7 days, THEN 1 capsule (125 mg total) in the morning for 7 days, THEN 1 capsule (125 mg total) every other day for 8 days., Disp: 81 capsule, Rfl: 0    Wegovy 2.4 MG/0.75ML, INJECT 0.75 ML (2.4 MG TOTAL) UNDER THE SKIN ONCE A WEEK, Disp: 3 mL, Rfl: 0    omeprazole (PriLOSEC) 40 MG capsule, Take 1 capsule (40 mg total) by mouth daily (Patient taking differently: Take 40 mg by mouth if needed), Disp: 90 capsule, Rfl: 1    phenazopyridine (PYRIDIUM) 200 mg tablet, Take 1 tablet (200 mg total) by mouth 3 (three) times a day with meals (Patient not taking: Reported on 1/29/2024), Disp: 10 tablet, Rfl: 0    Rinvoq 45 MG TB24, Take 1 tablet ( 45 mg) by mouth once daily in the morning. (Patient not taking: Reported on 1/29/2024), Disp: 28 tablet, Rfl: 10    Current Allergies     Allergies as of 03/21/2024 - Reviewed 03/21/2024   Allergen Reaction Noted    Infliximab Hives 03/16/2016    Mushroom extract complex - food allergy Hives 12/11/2023            The following portions of the patient's history were reviewed and updated as appropriate: allergies, current medications, past family history, past medical history, past social history, past surgical history and problem list.     Past Medical History:   Diagnosis Date    Chest  pain 10/28/2022    Class 1 obesity due to excess calories without serious comorbidity in adult 7/16/2020    Crohn's disease (HCC)     Dysuria 3/11/2022    Fever 12/8/2021    Frequency of urination 7/11/2022    Hidradenitis suppurativa     thighs    Nasal congestion 6/1/2022    Obesity, Class II, BMI 35-39.9     Sore throat 6/1/2022    TMJ (temporomandibular joint disorder)     last assessed 02/24/2016    Urinary tract infection     Varicella     vaccine       Past Surgical History:   Procedure Laterality Date    COLONOSCOPY      x several; last one 2/2020    WISDOM TOOTH EXTRACTION         Family History   Problem Relation Age of Onset    Diabetes Mother         Due to underlying condition with foot ulcer.  Type 2 DM without complication.    Diabetes Father     Polycystic ovary syndrome Sister     Lung cancer Maternal Uncle     Stroke Neg Hx     Heart attack Neg Hx     Hypertension Neg Hx     Breast cancer Neg Hx     Colon cancer Neg Hx     Ovarian cancer Neg Hx     Uterine cancer Neg Hx          Medications have been verified.        Objective   /78   Pulse 103   Temp (!) 97.3 °F (36.3 °C)   Resp 18   SpO2 99%   No LMP recorded.       Physical Exam     Physical Exam  Vitals reviewed.   Constitutional:       Appearance: She is well-developed.   HENT:      Nose: Congestion and rhinorrhea present.      Mouth/Throat:      Tonsils: No tonsillar exudate. 1+ on the right. 1+ on the left.   Lymphadenopathy:      Cervical: No cervical adenopathy.   Neurological:      Mental Status: She is alert.

## 2024-03-24 LAB — BACTERIA THROAT CULT: ABNORMAL

## 2024-03-25 ENCOUNTER — TELEPHONE (OUTPATIENT)
Dept: URGENT CARE | Age: 24
End: 2024-03-25

## 2024-03-26 ENCOUNTER — TELEPHONE (OUTPATIENT)
Age: 24
End: 2024-03-26

## 2024-03-26 ENCOUNTER — TELEPHONE (OUTPATIENT)
Dept: URGENT CARE | Age: 24
End: 2024-03-26

## 2024-03-26 DIAGNOSIS — J02.0 STREP THROAT: Primary | ICD-10-CM

## 2024-03-26 DIAGNOSIS — R05.9 COUGH, UNSPECIFIED TYPE: Primary | ICD-10-CM

## 2024-03-26 RX ORDER — BENZONATATE 100 MG/1
100 CAPSULE ORAL 3 TIMES DAILY PRN
Qty: 20 CAPSULE | Refills: 0 | Status: SHIPPED | OUTPATIENT
Start: 2024-03-26

## 2024-03-26 RX ORDER — PENICILLIN V POTASSIUM 500 MG/1
500 TABLET ORAL EVERY 12 HOURS
Qty: 20 TABLET | Refills: 0 | Status: SHIPPED | OUTPATIENT
Start: 2024-03-26 | End: 2024-04-05

## 2024-03-26 NOTE — TELEPHONE ENCOUNTER
Patient returned phone call stating she is still having a sore throat. Antibiotic sent to pharmacy at this time.

## 2024-03-26 NOTE — TELEPHONE ENCOUNTER
Pt called c/o cough and chest congestion. Pt was seen by express care and was given penicillin. Pt is still concerning the cough she has and would like to know if any OTC medications are recommended. Please review and contact pt back.

## 2024-04-10 DIAGNOSIS — R73.01 IFG (IMPAIRED FASTING GLUCOSE): ICD-10-CM

## 2024-04-10 RX ORDER — METFORMIN HYDROCHLORIDE 500 MG/1
500 TABLET, EXTENDED RELEASE ORAL
Qty: 30 TABLET | Refills: 0 | Status: SHIPPED | OUTPATIENT
Start: 2024-04-10

## 2024-04-21 DIAGNOSIS — R73.01 IFG (IMPAIRED FASTING GLUCOSE): ICD-10-CM

## 2024-04-21 DIAGNOSIS — E66.01 MORBID OBESITY (HCC): ICD-10-CM

## 2024-04-23 ENCOUNTER — TELEPHONE (OUTPATIENT)
Dept: FAMILY MEDICINE CLINIC | Facility: CLINIC | Age: 24
End: 2024-04-23

## 2024-04-23 RX ORDER — SEMAGLUTIDE 2.4 MG/.75ML
2.4 INJECTION, SOLUTION SUBCUTANEOUS WEEKLY
Qty: 3 ML | Refills: 0 | Status: SHIPPED | OUTPATIENT
Start: 2024-04-23

## 2024-04-23 NOTE — TELEPHONE ENCOUNTER
LM to let patient know that Dr. Epstein would like to see her in the offer to f/u on weight management medication wegovy. Please schedule an appointment for this or the next week if you can.

## 2024-04-24 ENCOUNTER — NURSE TRIAGE (OUTPATIENT)
Age: 24
End: 2024-04-24

## 2024-04-24 NOTE — TELEPHONE ENCOUNTER
"Pt with history of Crohn's reports having a meal at a restaurant 04/23 with cousin and developing diarrhea shortly thereafter. Pt inquiring if it is okay to use Pepto or Imodium as she has work scheduled at 11 am today. I informed pt to try Pepto first and if symptoms are not relieved she may then use Imodium. Pt cautioned against using more than recommended dose of Imodium. Encouraged pt to drink at least 8 cups of water daily. Pt states she will callback later in the day if further recommendations are required.      Reason for Disposition   Information only question and nurse able to answer    Answer Assessment - Initial Assessment Questions  1. REASON FOR CALL or QUESTION: \"What is your reason for calling today?\" or \"How can I best help you?\" or \"What question do you have that I can help answer?\"      Please see note    Protocols used: Information Only Call - No Triage-ADULT-[place]    "

## 2024-05-08 DIAGNOSIS — E55.9 VITAMIN D DEFICIENCY: ICD-10-CM

## 2024-05-08 RX ORDER — MELATONIN
1000 EVERY MORNING
Qty: 90 TABLET | Refills: 1 | Status: SHIPPED | OUTPATIENT
Start: 2024-05-08

## 2024-05-11 DIAGNOSIS — R73.01 IFG (IMPAIRED FASTING GLUCOSE): ICD-10-CM

## 2024-05-14 RX ORDER — METFORMIN HYDROCHLORIDE 500 MG/1
500 TABLET, EXTENDED RELEASE ORAL
Qty: 30 TABLET | Refills: 0 | Status: SHIPPED | OUTPATIENT
Start: 2024-05-14

## 2024-05-15 ENCOUNTER — OFFICE VISIT (OUTPATIENT)
Dept: FAMILY MEDICINE CLINIC | Facility: CLINIC | Age: 24
End: 2024-05-15
Payer: MEDICARE

## 2024-05-15 VITALS
OXYGEN SATURATION: 100 % | HEART RATE: 77 BPM | BODY MASS INDEX: 38.58 KG/M2 | SYSTOLIC BLOOD PRESSURE: 110 MMHG | WEIGHT: 226 LBS | HEIGHT: 64 IN | TEMPERATURE: 97.7 F | DIASTOLIC BLOOD PRESSURE: 70 MMHG

## 2024-05-15 DIAGNOSIS — K21.9 GASTROESOPHAGEAL REFLUX DISEASE, UNSPECIFIED WHETHER ESOPHAGITIS PRESENT: Primary | ICD-10-CM

## 2024-05-15 DIAGNOSIS — Z13.29 SCREENING FOR THYROID DISORDER: ICD-10-CM

## 2024-05-15 DIAGNOSIS — Z13.220 SCREENING FOR LIPID DISORDERS: ICD-10-CM

## 2024-05-15 DIAGNOSIS — Z83.3 FAMILY HISTORY OF DIABETES MELLITUS: ICD-10-CM

## 2024-05-15 DIAGNOSIS — E66.01 MORBID OBESITY (HCC): ICD-10-CM

## 2024-05-15 DIAGNOSIS — Z23 ENCOUNTER FOR IMMUNIZATION: ICD-10-CM

## 2024-05-15 DIAGNOSIS — R73.01 IFG (IMPAIRED FASTING GLUCOSE): ICD-10-CM

## 2024-05-15 DIAGNOSIS — D50.8 IRON DEFICIENCY ANEMIA SECONDARY TO INADEQUATE DIETARY IRON INTAKE: ICD-10-CM

## 2024-05-15 PROBLEM — R30.0 DYSURIA: Status: RESOLVED | Noted: 2022-03-11 | Resolved: 2024-05-15

## 2024-05-15 PROCEDURE — 90715 TDAP VACCINE 7 YRS/> IM: CPT

## 2024-05-15 PROCEDURE — 99214 OFFICE O/P EST MOD 30 MIN: CPT | Performed by: FAMILY MEDICINE

## 2024-05-15 PROCEDURE — 90471 IMMUNIZATION ADMIN: CPT

## 2024-05-16 NOTE — ASSESSMENT & PLAN NOTE
Improvement in the BMI compared with before BMI today 38.7 patient did not know Wegovy 2.4 mg injection weekly since she started medication she lost 20 pounds and happy with the result encourage patient to continue she tolerated well encourage patient to continue the portion control low-carb low-fat diet and increase physical activity

## 2024-05-16 NOTE — PROGRESS NOTES
Ambulatory Visit  Name: Pamella Stein      : 2000      MRN: [id number]  Encounter Provider: Melchor Epstein MD  Encounter Date: 5/15/2024   Encounter department: [facility]    Assessment & Plan   1. Gastroesophageal reflux disease, unspecified whether esophagitis present  Assessment & Plan:  Chronic asymptomatic patient had intolerance to omeprazole decided to stop it she used Tums in the time of the symptom patient already evaluated by GI discussed important lose weight avoid provoke food  Orders:  -     CBC and differential; Future  -     Basic metabolic panel; Future  2. Morbid obesity (HCC)  Assessment & Plan:  Improvement in the BMI compared with before BMI today 38.7 patient did not know Wegovy 2.4 mg injection weekly since she started medication she lost 20 pounds and happy with the result encourage patient to continue she tolerated well encourage patient to continue the portion control low-carb low-fat diet and increase physical activity  Orders:  -     CBC and differential; Future  -     Basic metabolic panel; Future  3. IFG (impaired fasting glucose)  Assessment & Plan:  Chronic asymptomatic fair control recent blood work fasting sugar within normal range encouraged patient to continue to lose weight  Orders:  -     CBC and differential; Future  -     Basic metabolic panel; Future  4. Iron deficiency anemia secondary to inadequate dietary iron intake  -     CBC and differential; Future  -     Basic metabolic panel; Future  5. Encounter for immunization  -     TDAP VACCINE GREATER THAN OR EQUAL TO 6YO IM  6. Family history of diabetes mellitus  -     CBC and differential; Future  -     Basic metabolic panel; Future  7. Screening for thyroid disorder  -     TSH, 3rd generation with Free T4 reflex; Future  8. Screening for lipid disorders  -     Lipid Panel with Direct LDL reflex; Future       History of Present Illness     Patient here follow-up with a chronic  "condition compliant with the medication tolerated well without side effect no new concerns recent blood work discussed with the patient        Review of Systems   Constitutional:  Negative for chills and fever.   HENT:  Negative for ear pain and sore throat.    Eyes:  Negative for pain and visual disturbance.   Respiratory:  Negative for cough and shortness of breath.    Cardiovascular:  Negative for chest pain and palpitations.   Gastrointestinal:  Negative for abdominal pain, constipation and vomiting.   Genitourinary:  Negative for dysuria and hematuria.   Musculoskeletal:  Negative for arthralgias and back pain.   Skin:  Negative for color change and rash.   Neurological:  Negative for seizures and syncope.   All other systems reviewed and are negative.      Objective     /70 (BP Location: Left arm, Patient Position: Sitting)   Pulse 77   Temp 97.7 °F (36.5 °C) (Tympanic)   Ht 5' 4\" (1.626 m)   Wt 103 kg (226 lb)   LMP 05/06/2024 (Exact Date)   SpO2 100%   Breastfeeding No   BMI 38.79 kg/m²     Physical Exam  Vitals and nursing note reviewed.   Constitutional:       General: She is not in acute distress.     Appearance: She is well-developed.   HENT:      Head: Normocephalic and atraumatic.      Mouth/Throat:      Pharynx: No posterior oropharyngeal erythema.   Eyes:      Conjunctiva/sclera: Conjunctivae normal.   Cardiovascular:      Rate and Rhythm: Normal rate and regular rhythm.      Heart sounds: No murmur heard.  Pulmonary:      Effort: Pulmonary effort is normal. No respiratory distress.      Breath sounds: Normal breath sounds.   Abdominal:      Palpations: Abdomen is soft.      Tenderness: There is no abdominal tenderness.   Musculoskeletal:         General: No swelling.      Cervical back: Neck supple.   Skin:     General: Skin is warm and dry.      Capillary Refill: Capillary refill takes less than 2 seconds.      Findings: No rash.   Neurological:      Mental Status: She is alert and " oriented to person, place, and time.   Psychiatric:         Mood and Affect: Mood normal.       Administrative Statements

## 2024-05-16 NOTE — ASSESSMENT & PLAN NOTE
Chronic asymptomatic patient had intolerance to omeprazole decided to stop it she used Tums in the time of the symptom patient already evaluated by GI discussed important lose weight avoid provoke food

## 2024-05-16 NOTE — ASSESSMENT & PLAN NOTE
Chronic asymptomatic fair control recent blood work fasting sugar within normal range encouraged patient to continue to lose weight

## 2024-05-17 DIAGNOSIS — R73.01 IFG (IMPAIRED FASTING GLUCOSE): ICD-10-CM

## 2024-05-17 DIAGNOSIS — E66.01 MORBID OBESITY (HCC): ICD-10-CM

## 2024-05-17 RX ORDER — SEMAGLUTIDE 2.4 MG/.75ML
2.4 INJECTION, SOLUTION SUBCUTANEOUS WEEKLY
Qty: 3 ML | Refills: 0 | Status: SHIPPED | OUTPATIENT
Start: 2024-05-17

## 2024-05-29 ENCOUNTER — TELEPHONE (OUTPATIENT)
Age: 24
End: 2024-05-29

## 2024-05-29 NOTE — TELEPHONE ENCOUNTER
Patient requested to change appt to virtual due to not being able to come in for the o/v. Patient is not experiencing any symptoms.

## 2024-06-03 ENCOUNTER — TELEMEDICINE (OUTPATIENT)
Dept: GASTROENTEROLOGY | Facility: CLINIC | Age: 24
End: 2024-06-03
Payer: MEDICARE

## 2024-06-03 DIAGNOSIS — K21.9 GASTROESOPHAGEAL REFLUX DISEASE, UNSPECIFIED WHETHER ESOPHAGITIS PRESENT: Primary | ICD-10-CM

## 2024-06-03 DIAGNOSIS — K51.319 ULCERATIVE RECTOSIGMOIDITIS WITH COMPLICATION (HCC): ICD-10-CM

## 2024-06-03 DIAGNOSIS — E55.9 VITAMIN D DEFICIENCY: ICD-10-CM

## 2024-06-03 PROCEDURE — 99215 OFFICE O/P EST HI 40 MIN: CPT | Performed by: INTERNAL MEDICINE

## 2024-06-03 RX ORDER — OMEPRAZOLE 40 MG/1
40 CAPSULE, DELAYED RELEASE ORAL DAILY
Qty: 90 CAPSULE | Refills: 1 | Status: SHIPPED | OUTPATIENT
Start: 2024-06-03

## 2024-06-05 DIAGNOSIS — R73.01 IFG (IMPAIRED FASTING GLUCOSE): ICD-10-CM

## 2024-06-05 RX ORDER — METFORMIN HYDROCHLORIDE 500 MG/1
500 TABLET, EXTENDED RELEASE ORAL
Qty: 30 TABLET | Refills: 2 | Status: SHIPPED | OUTPATIENT
Start: 2024-06-05

## 2024-06-10 DIAGNOSIS — R73.01 IFG (IMPAIRED FASTING GLUCOSE): ICD-10-CM

## 2024-06-10 DIAGNOSIS — E66.01 MORBID OBESITY (HCC): ICD-10-CM

## 2024-06-11 RX ORDER — SEMAGLUTIDE 2.4 MG/.75ML
2.4 INJECTION, SOLUTION SUBCUTANEOUS WEEKLY
Qty: 3 ML | Refills: 0 | Status: SHIPPED | OUTPATIENT
Start: 2024-06-11 | End: 2024-06-12

## 2024-06-12 DIAGNOSIS — R73.01 IFG (IMPAIRED FASTING GLUCOSE): ICD-10-CM

## 2024-06-12 DIAGNOSIS — E66.01 MORBID OBESITY (HCC): ICD-10-CM

## 2024-06-12 RX ORDER — SEMAGLUTIDE 2.4 MG/.75ML
2.4 INJECTION, SOLUTION SUBCUTANEOUS WEEKLY
Qty: 3 ML | Refills: 0 | Status: SHIPPED | OUTPATIENT
Start: 2024-06-12

## 2024-06-13 ENCOUNTER — NURSE TRIAGE (OUTPATIENT)
Age: 24
End: 2024-06-13

## 2024-06-13 ENCOUNTER — LAB (OUTPATIENT)
Dept: LAB | Facility: HOSPITAL | Age: 24
End: 2024-06-13
Payer: MEDICARE

## 2024-06-13 DIAGNOSIS — Z83.3 FAMILY HISTORY OF DIABETES MELLITUS: ICD-10-CM

## 2024-06-13 DIAGNOSIS — K21.9 GASTROESOPHAGEAL REFLUX DISEASE, UNSPECIFIED WHETHER ESOPHAGITIS PRESENT: ICD-10-CM

## 2024-06-13 DIAGNOSIS — R73.01 IFG (IMPAIRED FASTING GLUCOSE): ICD-10-CM

## 2024-06-13 DIAGNOSIS — D50.8 IRON DEFICIENCY ANEMIA SECONDARY TO INADEQUATE DIETARY IRON INTAKE: ICD-10-CM

## 2024-06-13 DIAGNOSIS — K51.319 ULCERATIVE RECTOSIGMOIDITIS WITH COMPLICATION (HCC): ICD-10-CM

## 2024-06-13 DIAGNOSIS — Z13.29 SCREENING FOR THYROID DISORDER: ICD-10-CM

## 2024-06-13 DIAGNOSIS — E55.9 VITAMIN D DEFICIENCY: ICD-10-CM

## 2024-06-13 DIAGNOSIS — E66.01 MORBID OBESITY (HCC): ICD-10-CM

## 2024-06-13 DIAGNOSIS — Z13.220 SCREENING FOR LIPID DISORDERS: ICD-10-CM

## 2024-06-13 LAB
25(OH)D3 SERPL-MCNC: 35.7 NG/ML (ref 30–100)
ALBUMIN SERPL BCP-MCNC: 4.5 G/DL (ref 3.5–5)
ALP SERPL-CCNC: 52 U/L (ref 34–104)
ALT SERPL W P-5'-P-CCNC: 13 U/L (ref 7–52)
ANION GAP SERPL CALCULATED.3IONS-SCNC: 8 MMOL/L (ref 4–13)
AST SERPL W P-5'-P-CCNC: 14 U/L (ref 13–39)
BASOPHILS # BLD AUTO: 0.05 THOUSANDS/ÂΜL (ref 0–0.1)
BASOPHILS NFR BLD AUTO: 1 % (ref 0–1)
BILIRUB SERPL-MCNC: 0.86 MG/DL (ref 0.2–1)
BUN SERPL-MCNC: 10 MG/DL (ref 5–25)
CALCIUM SERPL-MCNC: 9.9 MG/DL (ref 8.4–10.2)
CHLORIDE SERPL-SCNC: 101 MMOL/L (ref 96–108)
CHOLEST SERPL-MCNC: 135 MG/DL
CO2 SERPL-SCNC: 29 MMOL/L (ref 21–32)
CREAT SERPL-MCNC: 0.88 MG/DL (ref 0.6–1.3)
CRP SERPL QL: 7.7 MG/L
EOSINOPHIL # BLD AUTO: 0.12 THOUSAND/ÂΜL (ref 0–0.61)
EOSINOPHIL NFR BLD AUTO: 1 % (ref 0–6)
ERYTHROCYTE [DISTWIDTH] IN BLOOD BY AUTOMATED COUNT: 12.1 % (ref 11.6–15.1)
FERRITIN SERPL-MCNC: 75 NG/ML (ref 11–307)
GFR SERPL CREATININE-BSD FRML MDRD: 91 ML/MIN/1.73SQ M
GLUCOSE P FAST SERPL-MCNC: 87 MG/DL (ref 65–99)
HCT VFR BLD AUTO: 42.1 % (ref 34.8–46.1)
HDLC SERPL-MCNC: 55 MG/DL
HGB BLD-MCNC: 13.8 G/DL (ref 11.5–15.4)
IMM GRANULOCYTES # BLD AUTO: 0.04 THOUSAND/UL (ref 0–0.2)
IMM GRANULOCYTES NFR BLD AUTO: 1 % (ref 0–2)
IRON SATN MFR SERPL: 31 % (ref 15–50)
IRON SERPL-MCNC: 97 UG/DL (ref 50–212)
LDLC SERPL CALC-MCNC: 66 MG/DL (ref 0–100)
LYMPHOCYTES # BLD AUTO: 1.68 THOUSANDS/ÂΜL (ref 0.6–4.47)
LYMPHOCYTES NFR BLD AUTO: 19 % (ref 14–44)
MCH RBC QN AUTO: 30.9 PG (ref 26.8–34.3)
MCHC RBC AUTO-ENTMCNC: 32.8 G/DL (ref 31.4–37.4)
MCV RBC AUTO: 94 FL (ref 82–98)
MONOCYTES # BLD AUTO: 0.56 THOUSAND/ÂΜL (ref 0.17–1.22)
MONOCYTES NFR BLD AUTO: 7 % (ref 4–12)
NEUTROPHILS # BLD AUTO: 6.21 THOUSANDS/ÂΜL (ref 1.85–7.62)
NEUTS SEG NFR BLD AUTO: 71 % (ref 43–75)
NRBC BLD AUTO-RTO: 0 /100 WBCS
PLATELET # BLD AUTO: 407 THOUSANDS/UL (ref 149–390)
PMV BLD AUTO: 9.2 FL (ref 8.9–12.7)
POTASSIUM SERPL-SCNC: 4 MMOL/L (ref 3.5–5.3)
PROT SERPL-MCNC: 8.3 G/DL (ref 6.4–8.4)
RBC # BLD AUTO: 4.47 MILLION/UL (ref 3.81–5.12)
SODIUM SERPL-SCNC: 138 MMOL/L (ref 135–147)
TIBC SERPL-MCNC: 312 UG/DL (ref 250–450)
TRIGL SERPL-MCNC: 69 MG/DL
TSH SERPL DL<=0.05 MIU/L-ACNC: 0.7 UIU/ML (ref 0.45–4.5)
UIBC SERPL-MCNC: 215 UG/DL (ref 155–355)
VIT B12 SERPL-MCNC: 531 PG/ML (ref 180–914)
WBC # BLD AUTO: 8.66 THOUSAND/UL (ref 4.31–10.16)

## 2024-06-13 PROCEDURE — 82728 ASSAY OF FERRITIN: CPT

## 2024-06-13 PROCEDURE — 83550 IRON BINDING TEST: CPT

## 2024-06-13 PROCEDURE — 82306 VITAMIN D 25 HYDROXY: CPT

## 2024-06-13 PROCEDURE — 83540 ASSAY OF IRON: CPT

## 2024-06-13 PROCEDURE — 82607 VITAMIN B-12: CPT

## 2024-06-13 PROCEDURE — 85025 COMPLETE CBC W/AUTO DIFF WBC: CPT

## 2024-06-13 PROCEDURE — 80061 LIPID PANEL: CPT

## 2024-06-13 PROCEDURE — 86140 C-REACTIVE PROTEIN: CPT

## 2024-06-13 PROCEDURE — 36415 COLL VENOUS BLD VENIPUNCTURE: CPT

## 2024-06-13 PROCEDURE — 80053 COMPREHEN METABOLIC PANEL: CPT

## 2024-06-13 PROCEDURE — 84443 ASSAY THYROID STIM HORMONE: CPT

## 2024-06-13 NOTE — TELEPHONE ENCOUNTER
Regarding: abdominal cramping  ----- Message from Destiny GIRON sent at 6/13/2024  7:59 AM EDT -----  Patient called and stated she is still having abdominal cramping, although diarrhea has gotten better and there is no blood in the stool she is still concern regarding her cramping, she will get her blood work done today please review and reach out thank you

## 2024-06-13 NOTE — TELEPHONE ENCOUNTER
"Televisit 6/3/24 Dr. Montoya (ulcerative colitis, GERD)    Patient's started with diarrhea/cramping two days ago. Her sister had same symptoms \"stomach bug\". Patient's diarrhea improved but she continues with some abdominal cramping above umbilicus. She had chills yesterday, no fever. She did not pass any blood in stool, today soft formed. She continues with the abdominal cramping does get relief with dicyclomine. She is eating bland foods/hydrating.  She will be reporting to lab today for tests ordered at visit. Patient wanted provider to be aware to see if any further recommendations.    Patient knows to report to ED for worsening symptoms.  Please review/advise.      Reason for Disposition   The patient has had 3-14 bowel movements within a 24 hour period AND has either abdominal pain of 5-8/10 or is passing blood or mucus MT.    Answer Questions - Initial Assessment   Are you experiencing abdominal pain: Yes - When did the pain start: sudden  Where is the pain located: above \"bellybutton\"  Does it radiate and where: denies  Does the patient recall any inciting factors: sister had stomach virus, same symptoms  Any aggrevating factors: nothing  Any alleviating factors: movement  Is the pain: intermittent  Pain scale: 5    What is the consistency of your stool: diarrhea is not soft stool    If diarrhea, has there been any recent antibiotic use: no    If constipation, when was your last bm N/A, are you passing gas: yes    How many bowel movements are you having within a 24 hour period: five yesterday    Do you see any blood or mucus in your stool and if so how much are you seeing: no    Do you have a history of hemorrhoids or anal fissures: no    Do you have increased urgency: No    Do you have nausea or vomiting and if so how often and what consistency: no    What IBD medications are you taking and are you up to date with the past 3 doses: Rinvoq up to date    Have you had any recent diet changes: no    Have you had " any recent travel, sick contacts, or recent hospitalizations: yes, sister    Have you had any recent heightened stressors: no    Do you have any fever, chills, sweats, joint pain, rashes: chills yesterday    Do you have any other concerns: denies    Protocols used: IBD

## 2024-06-14 ENCOUNTER — TELEPHONE (OUTPATIENT)
Dept: FAMILY MEDICINE CLINIC | Facility: CLINIC | Age: 24
End: 2024-06-14

## 2024-06-14 ENCOUNTER — APPOINTMENT (OUTPATIENT)
Dept: LAB | Facility: HOSPITAL | Age: 24
End: 2024-06-14
Payer: MEDICARE

## 2024-06-14 ENCOUNTER — APPOINTMENT (OUTPATIENT)
Dept: LAB | Facility: HOSPITAL | Age: 24
End: 2024-06-14

## 2024-06-14 DIAGNOSIS — K50.10 CROHN'S DISEASE OF LARGE INTESTINE WITHOUT COMPLICATION (HCC): ICD-10-CM

## 2024-06-14 DIAGNOSIS — K51.319 ULCERATIVE RECTOSIGMOIDITIS WITH COMPLICATION (HCC): ICD-10-CM

## 2024-06-14 PROCEDURE — 83993 ASSAY FOR CALPROTECTIN FECAL: CPT

## 2024-06-14 NOTE — TELEPHONE ENCOUNTER
----- Message from Asad Osborne MD sent at 6/13/2024  2:46 PM EDT -----  Labs are largely within normal ranges, patient should continue to follow-up with PCP.

## 2024-06-20 DIAGNOSIS — K51.319 ULCERATIVE RECTOSIGMOIDITIS WITH COMPLICATION (HCC): Primary | ICD-10-CM

## 2024-06-20 LAB — CALPROTECTIN STL-MCNT: 1700 UG/G (ref 0–120)

## 2024-06-26 ENCOUNTER — TELEPHONE (OUTPATIENT)
Age: 24
End: 2024-06-26

## 2024-06-26 NOTE — TELEPHONE ENCOUNTER
PA for wegovy 2.4mg    Submitted via    [x]CMM-KEY [id number]  [][employer]-Case ID #   []Faxed to plan   []Other website   []Phone call Case ID #     Office notes sent, clinical questions answered. Awaiting determination    Turnaround time for your insurance to make a decision on your Prior Authorization can take 7-21 business days.

## 2024-07-05 NOTE — TELEPHONE ENCOUNTER
PA for WEGOVY    Submitted via    [x]CMM-KEY [id number]  [][employer]-Case ID #   []Faxed to plan   []Other website   []Phone call Case ID #     Office notes sent, clinical questions answered. Awaiting determination    Turnaround time for your insurance to make a decision on your Prior Authorization can take 7-21 business days.

## 2024-07-15 ENCOUNTER — OFFICE VISIT (OUTPATIENT)
Dept: FAMILY MEDICINE CLINIC | Facility: CLINIC | Age: 24
End: 2024-07-15
Payer: MEDICARE

## 2024-07-15 VITALS
DIASTOLIC BLOOD PRESSURE: 80 MMHG | HEART RATE: 101 BPM | OXYGEN SATURATION: 96 % | WEIGHT: 222 LBS | BODY MASS INDEX: 36.99 KG/M2 | SYSTOLIC BLOOD PRESSURE: 110 MMHG | HEIGHT: 65 IN | TEMPERATURE: 97.9 F

## 2024-07-15 DIAGNOSIS — K59.03 DRUG-INDUCED CONSTIPATION: ICD-10-CM

## 2024-07-15 DIAGNOSIS — R23.3 EASY BRUISING: Primary | ICD-10-CM

## 2024-07-15 DIAGNOSIS — E55.9 VITAMIN D DEFICIENCY: ICD-10-CM

## 2024-07-15 PROCEDURE — 99214 OFFICE O/P EST MOD 30 MIN: CPT | Performed by: FAMILY MEDICINE

## 2024-07-15 NOTE — PROGRESS NOTES
Ambulatory Visit  Name: Pamella Stein      : 2000      MRN: [id number]  Encounter Provider: Melchor Epstein MD  Encounter Date: 7/15/2024   Encounter department: [facility]    Assessment & Plan   1. Easy bruising  Assessment & Plan:  New diagnosis symptomatic patient currently not on any blood thinners no personal or family history of bleeding disorder recommend to check CBC with the platelet function PT PTT INR will follow-up with the result accordingly  Orders:  -     Protime-INR; Future  -     APTT; Future  -     Insulin, fasting; Future  -     CBC and Platelet; Future  2. Drug-induced constipation  Assessment & Plan:  Chronic asymptomatic fair control continue current management  3. Vitamin D deficiency  Assessment & Plan:  Chronic asymptomatic continue current vitamin D supplement and due for vitamin D level before next appointment       History of Present Illness     Patient here for follow-up and she is concerned about easy bruising she been feeling have ecchymosis or discoloration in the skin in the sporadic area she does not recall any trauma denies any nosebleed no heavy menstruation and 1 as she have anyone she will stop the bleeding easy no personal or family history of bleeding disorder or hypercoagulation problem        Review of Systems   Constitutional:  Negative for chills and fever.   HENT:  Negative for ear pain and sore throat.    Eyes:  Negative for pain and visual disturbance.   Respiratory:  Negative for cough and shortness of breath.    Cardiovascular:  Negative for chest pain and palpitations.   Gastrointestinal:  Negative for abdominal pain, constipation, diarrhea and vomiting.   Genitourinary:  Negative for dysuria and hematuria.   Musculoskeletal:  Negative for arthralgias and back pain.   Skin:  Negative for color change and rash.   Neurological:  Negative for seizures and syncope.   Hematological:  Bruises/bleeds easily.   All other systems  "reviewed and are negative.      Objective     /80 (BP Location: Left arm, Patient Position: Sitting)   Pulse 101   Temp 97.9 °F (36.6 °C) (Tympanic)   Ht 5' 4.5\" (1.638 m)   Wt 101 kg (222 lb)   LMP 07/02/2024 (Exact Date)   SpO2 96%   Breastfeeding No   BMI 37.52 kg/m²     Physical Exam  Vitals and nursing note reviewed.   Constitutional:       General: She is not in acute distress.     Appearance: She is well-developed. She is not diaphoretic.   HENT:      Head: Normocephalic.      Right Ear: Tympanic membrane and external ear normal.      Left Ear: Tympanic membrane and external ear normal.      Nose: No rhinorrhea.      Mouth/Throat:      Pharynx: No posterior oropharyngeal erythema.   Eyes:      General:         Right eye: No discharge.         Left eye: No discharge.      Conjunctiva/sclera: Conjunctivae normal.   Neck:      Vascular: No JVD.   Cardiovascular:      Rate and Rhythm: Normal rate and regular rhythm.      Heart sounds: Normal heart sounds. No murmur heard.     No gallop.   Pulmonary:      Effort: Pulmonary effort is normal. No respiratory distress.      Breath sounds: Normal breath sounds. No stridor. No wheezing or rales.   Chest:      Chest wall: No tenderness.   Abdominal:      General: There is no distension.      Palpations: Abdomen is soft. There is no mass.      Tenderness: There is no abdominal tenderness. There is no rebound.   Musculoskeletal:         General: No tenderness.      Cervical back: Normal range of motion and neck supple.   Lymphadenopathy:      Cervical: No cervical adenopathy.   Skin:     General: Skin is warm.      Findings: Bruising present. No erythema or rash.   Neurological:      Mental Status: She is alert and oriented to person, place, and time.       Administrative Statements           "

## 2024-07-16 PROBLEM — R23.3 EASY BRUISING: Status: ACTIVE | Noted: 2024-07-16

## 2024-07-16 NOTE — ASSESSMENT & PLAN NOTE
Chronic asymptomatic continue current vitamin D supplement and due for vitamin D level before next appointment

## 2024-07-16 NOTE — ASSESSMENT & PLAN NOTE
New diagnosis symptomatic patient currently not on any blood thinners no personal or family history of bleeding disorder recommend to check CBC with the platelet function PT PTT INR will follow-up with the result accordingly

## 2024-07-24 DIAGNOSIS — K21.9 GASTROESOPHAGEAL REFLUX DISEASE, UNSPECIFIED WHETHER ESOPHAGITIS PRESENT: Primary | ICD-10-CM

## 2024-07-24 RX ORDER — FAMOTIDINE 40 MG/1
40 TABLET, FILM COATED ORAL
Qty: 90 TABLET | Refills: 3 | Status: SHIPPED | OUTPATIENT
Start: 2024-07-24

## 2024-07-29 DIAGNOSIS — K51.319 ULCERATIVE RECTOSIGMOIDITIS WITH COMPLICATION (HCC): ICD-10-CM

## 2024-07-29 DIAGNOSIS — E61.1 IRON DEFICIENCY: ICD-10-CM

## 2024-07-29 RX ORDER — FERROUS SULFATE 325(65) MG
1 TABLET, DELAYED RELEASE (ENTERIC COATED) ORAL
Qty: 90 TABLET | OUTPATIENT
Start: 2024-07-29

## 2024-07-29 NOTE — TELEPHONE ENCOUNTER
I spoke to Pamella.  Mild headache for the last several days.  Mostly bothers her in the morning when she wakes up and has congestion.  If she goes back to sleep, the headache goes away.  Currently 2 out of 10.  Wants to know if she can take Excedrin without aspirin, which is okay.  She can also take ibuprofen for a few days.  She will update me and if the headache continues, will order head CT.

## 2024-07-31 ENCOUNTER — PATIENT MESSAGE (OUTPATIENT)
Dept: GASTROENTEROLOGY | Facility: CLINIC | Age: 24
End: 2024-07-31

## 2024-07-31 DIAGNOSIS — G44.52 NEW DAILY PERSISTENT HEADACHE: Primary | ICD-10-CM

## 2024-08-01 DIAGNOSIS — R73.01 IFG (IMPAIRED FASTING GLUCOSE): ICD-10-CM

## 2024-08-01 DIAGNOSIS — E66.01 MORBID OBESITY (HCC): ICD-10-CM

## 2024-08-02 RX ORDER — SEMAGLUTIDE 2.4 MG/.75ML
2.4 INJECTION, SOLUTION SUBCUTANEOUS WEEKLY
Qty: 3 ML | Refills: 0 | Status: SHIPPED | OUTPATIENT
Start: 2024-08-02

## 2024-08-08 ENCOUNTER — HOSPITAL ENCOUNTER (OUTPATIENT)
Dept: CT IMAGING | Facility: HOSPITAL | Age: 24
End: 2024-08-08
Attending: INTERNAL MEDICINE
Payer: MEDICARE

## 2024-08-08 DIAGNOSIS — G44.52 NEW DAILY PERSISTENT HEADACHE: ICD-10-CM

## 2024-08-08 PROCEDURE — 70450 CT HEAD/BRAIN W/O DYE: CPT

## 2024-08-14 ENCOUNTER — TELEPHONE (OUTPATIENT)
Age: 24
End: 2024-08-14

## 2024-08-14 NOTE — TELEPHONE ENCOUNTER
Pt asking if she can be excused from jury duty. Date 10/14  Juror id [id number]  Sequence [phone]    Pt can't be far away from a bathroom for extended periods of time, can't sit that long, has anxiety and neurological problems.

## 2024-08-15 ENCOUNTER — TELEPHONE (OUTPATIENT)
Age: 24
End: 2024-08-15

## 2024-08-23 DIAGNOSIS — K51.319 ULCERATIVE RECTOSIGMOIDITIS WITH COMPLICATION (HCC): ICD-10-CM

## 2024-08-23 RX ORDER — UPADACITINIB 30 MG/1
TABLET, EXTENDED RELEASE ORAL
Qty: 30 TABLET | Refills: 10 | Status: SHIPPED | OUTPATIENT
Start: 2024-08-23

## 2024-08-26 DIAGNOSIS — R73.01 IFG (IMPAIRED FASTING GLUCOSE): ICD-10-CM

## 2024-08-26 DIAGNOSIS — E66.01 MORBID OBESITY (HCC): ICD-10-CM

## 2024-08-26 RX ORDER — SEMAGLUTIDE 2.4 MG/.75ML
2.4 INJECTION, SOLUTION SUBCUTANEOUS WEEKLY
Qty: 3 ML | Refills: 0 | Status: SHIPPED | OUTPATIENT
Start: 2024-08-26

## 2024-09-16 DIAGNOSIS — K51.319 ULCERATIVE RECTOSIGMOIDITIS WITH COMPLICATION (HCC): ICD-10-CM

## 2024-09-16 DIAGNOSIS — E61.1 IRON DEFICIENCY: ICD-10-CM

## 2024-09-17 RX ORDER — FERROUS SULFATE 325(65) MG
1 TABLET, DELAYED RELEASE (ENTERIC COATED) ORAL
Qty: 90 TABLET | Refills: 1 | Status: SHIPPED | OUTPATIENT
Start: 2024-09-17

## 2024-10-09 DIAGNOSIS — R73.01 IFG (IMPAIRED FASTING GLUCOSE): ICD-10-CM

## 2024-10-09 DIAGNOSIS — E66.01 MORBID OBESITY (HCC): ICD-10-CM

## 2024-10-10 RX ORDER — SEMAGLUTIDE 2.4 MG/.75ML
2.4 INJECTION, SOLUTION SUBCUTANEOUS WEEKLY
Qty: 3 ML | Refills: 0 | Status: SHIPPED | OUTPATIENT
Start: 2024-10-10 | End: 2024-10-18

## 2024-10-12 ENCOUNTER — OFFICE VISIT (OUTPATIENT)
Dept: URGENT CARE | Age: 24
End: 2024-10-12
Payer: MEDICARE

## 2024-10-12 VITALS
DIASTOLIC BLOOD PRESSURE: 67 MMHG | TEMPERATURE: 98.1 F | HEART RATE: 96 BPM | OXYGEN SATURATION: 99 % | SYSTOLIC BLOOD PRESSURE: 135 MMHG | RESPIRATION RATE: 18 BRPM

## 2024-10-12 DIAGNOSIS — N39.0 URINARY TRACT INFECTION WITHOUT HEMATURIA, SITE UNSPECIFIED: Primary | ICD-10-CM

## 2024-10-12 LAB
SL AMB  POCT GLUCOSE, UA: NEGATIVE
SL AMB LEUKOCYTE ESTERASE,UA: NEGATIVE
SL AMB POCT BILIRUBIN,UA: NEGATIVE
SL AMB POCT BLOOD,UA: NEGATIVE
SL AMB POCT CLARITY,UA: CLEAR
SL AMB POCT COLOR,UA: YELLOW
SL AMB POCT KETONES,UA: NEGATIVE
SL AMB POCT NITRITE,UA: NEGATIVE
SL AMB POCT PH,UA: 6
SL AMB POCT SPECIFIC GRAVITY,UA: 1
SL AMB POCT URINE PROTEIN: NEGATIVE
SL AMB POCT UROBILINOGEN: 0.2

## 2024-10-12 PROCEDURE — 87086 URINE CULTURE/COLONY COUNT: CPT | Performed by: PHYSICIAN ASSISTANT

## 2024-10-12 PROCEDURE — 81002 URINALYSIS NONAUTO W/O SCOPE: CPT | Performed by: PHYSICIAN ASSISTANT

## 2024-10-12 PROCEDURE — 99213 OFFICE O/P EST LOW 20 MIN: CPT | Performed by: PHYSICIAN ASSISTANT

## 2024-10-12 RX ORDER — CEPHALEXIN 500 MG/1
500 CAPSULE ORAL EVERY 8 HOURS SCHEDULED
Qty: 21 CAPSULE | Refills: 0 | Status: SHIPPED | OUTPATIENT
Start: 2024-10-12 | End: 2024-10-19

## 2024-10-12 NOTE — PROGRESS NOTES
[facility]        NAME: Pamella Stein is a 23 y.o. female  : 2000    MRN: [id number]  DATE: 2024  TIME: 9:07 AM    /67   Pulse 96   Temp 98.1 °F (36.7 °C)   Resp 18   SpO2 99%     Assessment and Plan   Urinary tract infection without hematuria, site unspecified [N39.0]  1. Urinary tract infection without hematuria, site unspecified  POCT urine dip    Urine culture    Urine culture    cephalexin (KEFLEX) 500 mg capsule            Patient Instructions       Follow up with PCP in 3-5 days.  Proceed to  ER if symptoms worsen.    Chief Complaint     Chief Complaint   Patient presents with    Possible UTI     Patient reports burning with urination x 3 days. She also reports dry cough X 1 week         History of Present Illness       Pt with urine urgency and frequency for 3 days         Review of Systems   Review of Systems   Constitutional: Negative.    HENT: Negative.     Eyes: Negative.    Respiratory: Negative.     Cardiovascular: Negative.    Gastrointestinal: Negative.    Endocrine: Negative.    Genitourinary: Negative.    Musculoskeletal: Negative.    Skin: Negative.    Allergic/Immunologic: Negative.    Neurological: Negative.    Hematological: Negative.    Psychiatric/Behavioral: Negative.     All other systems reviewed and are negative.        Current Medications       Current Outpatient Medications:     Calcium Carb-Cholecalciferol 600-25 MG-MCG TABS, Take 1 tablet by mouth in the morning, Disp: 90 tablet, Rfl: 1    Calcium Carbonate 1500 (600 Ca) MG TABS, TAKE 1 TABLET BY MOUTH DAILY IN THE MORNING, Disp: 90 tablet, Rfl: 1    cephalexin (KEFLEX) 500 mg capsule, Take 1 capsule (500 mg total) by mouth every 8 (eight) hours for 7 days, Disp: 21 capsule, Rfl: 0    cholecalciferol (VITAMIN D3) 1,000 units tablet, TAKE 1 TABLET BY MOUTH DAILY IN THE MORNING, Disp: 90 tablet, Rfl: 1    famotidine (PEPCID) 40 MG tablet, Take 1 tablet (40 mg total) by mouth daily at bedtime,  Disp: 90 tablet, Rfl: 3    ferrous sulfate 325 (65 FE) MG EC tablet, TAKE 1 TABLET (324 MG TOTAL) BY MOUTH DAILY BEFORE BREAKFAST, Disp: 90 tablet, Rfl: 1    metFORMIN (GLUCOPHAGE-XR) 500 mg 24 hr tablet, TAKE 1 TABLET (500 MG TOTAL) BY MOUTH DAILY WITH DINNER, Disp: 30 tablet, Rfl: 2    Multiple Vitamins-Minerals (HAIR SKIN AND NAILS FORMULA PO), Take by mouth, Disp: , Rfl:     Rinvoq 30 MG TB24, Take 1 tablet (30 mg) by mouth once daily in the morning., Disp: 30 tablet, Rfl: 10    Wegovy 2.4 MG/0.75ML, INJECT 0.75 ML (2.4 MG TOTAL) UNDER THE SKIN ONCE A WEEK, Disp: 3 mL, Rfl: 0    dicyclomine (BENTYL) 10 mg capsule, Take 1 capsule (10 mg total) by mouth 2 (two) times a day as needed (cramping) (Patient not taking: Reported on 10/12/2024), Disp: 60 capsule, Rfl: 3    omeprazole (PriLOSEC) 40 MG capsule, Take 1 capsule (40 mg total) by mouth daily (Patient not taking: Reported on 10/12/2024), Disp: 90 capsule, Rfl: 1    ondansetron (Zofran ODT) 4 mg disintegrating tablet, Take 1 tablet (4 mg total) by mouth every 8 (eight) hours as needed for nausea or vomiting (Patient not taking: Reported on 10/12/2024), Disp: 20 tablet, Rfl: 0    Current Allergies     Allergies as of 10/12/2024 - Reviewed 10/12/2024   Allergen Reaction Noted    Infliximab Hives 03/16/2016    Mushroom extract complex - food allergy Hives 12/11/2023            The following portions of the patient's history were reviewed and updated as appropriate: allergies, current medications, past family history, past medical history, past social history, past surgical history and problem list.     Past Medical History:   Diagnosis Date    Chest pain 10/28/2022    Class 1 obesity due to excess calories without serious comorbidity in adult 7/16/2020    Crohn's disease (HCC)     Dysuria 3/11/2022    Fever 12/8/2021    Frequency of urination 7/11/2022    Hidradenitis suppurativa     thighs    Nasal congestion 6/1/2022    Obesity, Class II, BMI 35-39.9     Sore  throat 6/1/2022    TMJ (temporomandibular joint disorder)     last assessed 02/24/2016    Urinary tract infection     Varicella     vaccine       Past Surgical History:   Procedure Laterality Date    COLONOSCOPY      x several; last one 2/2020    WISDOM TOOTH EXTRACTION         Family History   Problem Relation Age of Onset    Diabetes Mother         Due to underlying condition with foot ulcer.  Type 2 DM without complication.    Diabetes Father     Polycystic ovary syndrome Sister     Lung cancer Maternal Uncle     Stroke Neg Hx     Heart attack Neg Hx     Hypertension Neg Hx     Breast cancer Neg Hx     Colon cancer Neg Hx     Ovarian cancer Neg Hx     Uterine cancer Neg Hx          Medications have been verified.        Objective   /67   Pulse 96   Temp 98.1 °F (36.7 °C)   Resp 18   SpO2 99%        Physical Exam     Physical Exam  Vitals and nursing note reviewed.   Constitutional:       Appearance: Normal appearance. She is normal weight.      Comments: Pt has had keflex in past without problems     HENT:      Head: Normocephalic and atraumatic.      Right Ear: Tympanic membrane, ear canal and external ear normal.      Left Ear: Tympanic membrane, ear canal and external ear normal.      Nose: Nose normal.      Mouth/Throat:      Mouth: Mucous membranes are moist.   Eyes:      Extraocular Movements: Extraocular movements intact.      Conjunctiva/sclera: Conjunctivae normal.      Pupils: Pupils are equal, round, and reactive to light.   Cardiovascular:      Rate and Rhythm: Normal rate and regular rhythm.      Pulses: Normal pulses.      Heart sounds: Normal heart sounds.   Pulmonary:      Breath sounds: Normal breath sounds.   Abdominal:      General: Bowel sounds are normal.      Palpations: Abdomen is soft.   Musculoskeletal:         General: Normal range of motion.      Cervical back: Normal range of motion and neck supple.   Skin:     General: Skin is warm.      Capillary Refill: Capillary refill  takes less than 2 seconds.   Neurological:      Mental Status: She is alert and oriented to person, place, and time.

## 2024-10-14 LAB — BACTERIA UR CULT: NORMAL

## 2024-10-17 ENCOUNTER — TELEPHONE (OUTPATIENT)
Age: 24
End: 2024-10-17

## 2024-10-17 NOTE — TELEPHONE ENCOUNTER
"Pt called and said she would like an opinion from provider regarding some issues.   \"On Saturday, I went to UC because I was having frequent urination and burning in pee. I did a test for UTI and it came back negative, another was sent in and I was told that there was mixed contamination. I was given antibiotics and it doesn't feel like its working. I was having burning sensation in pee and sharp pain in back.\" Pt is wondering if she should go to emergency room as well. Pt would like to referred to urologist as well.   "

## 2024-10-17 NOTE — TELEPHONE ENCOUNTER
Pt scheduled for appt 10/18/24. I advised pt that if pain gets worse, go to ER. Patient understands.

## 2024-10-18 ENCOUNTER — OFFICE VISIT (OUTPATIENT)
Dept: FAMILY MEDICINE CLINIC | Facility: CLINIC | Age: 24
End: 2024-10-18
Payer: MEDICARE

## 2024-10-18 VITALS
SYSTOLIC BLOOD PRESSURE: 128 MMHG | BODY MASS INDEX: 39.49 KG/M2 | HEART RATE: 82 BPM | WEIGHT: 237 LBS | OXYGEN SATURATION: 98 % | DIASTOLIC BLOOD PRESSURE: 80 MMHG | TEMPERATURE: 99 F | HEIGHT: 65 IN

## 2024-10-18 DIAGNOSIS — E66.01 MORBID OBESITY (HCC): ICD-10-CM

## 2024-10-18 DIAGNOSIS — F41.1 GAD (GENERALIZED ANXIETY DISORDER): ICD-10-CM

## 2024-10-18 DIAGNOSIS — R39.15 URINARY URGENCY: Primary | ICD-10-CM

## 2024-10-18 PROCEDURE — 99214 OFFICE O/P EST MOD 30 MIN: CPT | Performed by: FAMILY MEDICINE

## 2024-10-18 RX ORDER — TIRZEPATIDE 7.5 MG/.5ML
7.5 INJECTION, SOLUTION SUBCUTANEOUS WEEKLY
Qty: 2 ML | Refills: 0 | Status: SHIPPED | OUTPATIENT
Start: 2024-10-18

## 2024-10-18 RX ORDER — FLUOXETINE 10 MG/1
10 CAPSULE ORAL DAILY
Qty: 30 CAPSULE | Refills: 1 | Status: SHIPPED | OUTPATIENT
Start: 2024-10-18

## 2024-10-19 PROBLEM — R39.15 URINARY URGENCY: Status: ACTIVE | Noted: 2024-10-19

## 2024-10-19 PROBLEM — F41.1 GAD (GENERALIZED ANXIETY DISORDER): Status: ACTIVE | Noted: 2024-10-19

## 2024-10-19 NOTE — ASSESSMENT & PLAN NOTE
Patient currently on Wegovy 2.4 mg injection she is not successful to lose weight she would like to try the other medication    We discussed Zepbound and likely start her on Zepbound 7.5 mg weekly injection  Prior Authorization Clinical Questions for Weight Management Pharmacotherapy    1. Does the patient have a contrainidcation to medication prescribed for weight management?: No  2. Does the patient have a diagnosis of obesity, confirmed by a BMI greater than or equal to 30 kg/m^2?: Yes  3. Does the patient have a BMI of greater than or equal to 27 kg/m^2 with at least one weight-related comorbidity/risk factor/complication (e.g. diabetes, dyslipidemia, coronary artery disease)?: Yes  5. Has the patient been on a weight loss regimen of low-calorie diet, increased physical activity, and lifestyle modifications for a minimum of 6 months?: Yes  6. Has the patient completed a comprehensive weight loss program (ie, Weight Watchers, Noom, Bariatrics, other lauren on phone)? If so, what?: No  7. Does the patient have a history of type 2 diabetes?: No  8. Has the member tried and failed other weight loss medication within the past 12 months?: Yes  9. Will the member use requested medication in combination with another GLP agonist or weight loss drug?: No  10. Is the medication a controlled substance?: No  For renewals: Has the patient had a positive outcome with current weight management medication (i.e., change in body weight of at least 4-5% after 12-16 weeks on maximally tolerated dose)?: No     Baseline weight (in pounds): 237 lbs         Orders:    tirzepatide (Zepbound) 7.5 mg/0.5 mL auto-injector; Inject 0.5 mL (7.5 mg total) under the skin once a week

## 2024-10-19 NOTE — PROGRESS NOTES
Ambulatory Visit  Name: Pamella Stein      : 2000      MRN: [id number]  Encounter Provider: Melchor Epstein MD  Encounter Date: 10/18/2024   Encounter department: [facility]      Assessment & Plan  Urinary urgency  New diagnosis symptomatic recent urgent care visit UA and CNS negative recommend to be evaluated by OB/GYN    Orders:    Ambulatory Referral to Urogynecology; Future    Morbid obesity (HCC)    Patient currently on Wegovy 2.4 mg injection she is not successful to lose weight she would like to try the other medication    We discussed Zepbound and likely start her on Zepbound 7.5 mg weekly injection  Prior Authorization Clinical Questions for Weight Management Pharmacotherapy    1. Does the patient have a contrainidcation to medication prescribed for weight management?: No  2. Does the patient have a diagnosis of obesity, confirmed by a BMI greater than or equal to 30 kg/m^2?: Yes  3. Does the patient have a BMI of greater than or equal to 27 kg/m^2 with at least one weight-related comorbidity/risk factor/complication (e.g. diabetes, dyslipidemia, coronary artery disease)?: Yes  5. Has the patient been on a weight loss regimen of low-calorie diet, increased physical activity, and lifestyle modifications for a minimum of 6 months?: Yes  6. Has the patient completed a comprehensive weight loss program (ie, Weight Watchers, Noom, Bariatrics, other lauren on phone)? If so, what?: No  7. Does the patient have a history of type 2 diabetes?: No  8. Has the member tried and failed other weight loss medication within the past 12 months?: Yes  9. Will the member use requested medication in combination with another GLP agonist or weight loss drug?: No  10. Is the medication a controlled substance?: No  For renewals: Has the patient had a positive outcome with current weight management medication (i.e., change in body weight of at least 4-5% after 12-16 weeks on maximally  "tolerated dose)?: No     Baseline weight (in pounds): 237 lbs         Orders:    tirzepatide (Zepbound) 7.5 mg/0.5 mL auto-injector; Inject 0.5 mL (7.5 mg total) under the skin once a week    TOMÁS (generalized anxiety disorder)  New diagnosis symptomatic recommend to start Prozac 10 mg once a day proper use and possible side effects discussed the patient    Orders:    FLUoxetine (PROzac) 10 mg capsule; Take 1 capsule (10 mg total) by mouth daily         History of Present Illness     Patient today concerned about urgency urination no abdomen pain no flank pain no burning sensation urination no fever no blood in the urine recently was at urgent care record review including UA and CS no abnormal finding patient known to have history of anxiety affecting her sleep her appetite not sleeping well she been trying different techniques to relax and is not successful patient on noted to have history of morbid obesity currently on Wegovy 2.4 mg she been taking the maximum dose for a while has not been losing weight      Review of Systems   Constitutional:  Negative for chills and fever.   HENT:  Negative for ear pain and sore throat.    Eyes:  Negative for pain and visual disturbance.   Respiratory:  Negative for cough and shortness of breath.    Cardiovascular:  Negative for chest pain and palpitations.   Gastrointestinal:  Negative for abdominal pain, constipation, diarrhea and vomiting.   Genitourinary:  Positive for urgency. Negative for dysuria and hematuria.   Musculoskeletal:  Negative for arthralgias and back pain.   Skin:  Negative for color change and rash.   Neurological:  Negative for seizures and syncope.   Psychiatric/Behavioral:  The patient is nervous/anxious.    All other systems reviewed and are negative.    Objective     /80 (BP Location: Left arm, Patient Position: Sitting)   Pulse 82   Temp 99 °F (37.2 °C) (Tympanic)   Ht 5' 4.5\" (1.638 m)   Wt 108 kg (237 lb)   SpO2 98%   BMI 40.05 kg/m² "     Physical Exam  Vitals and nursing note reviewed.   Constitutional:       General: She is not in acute distress.     Appearance: She is well-developed. She is not diaphoretic.   HENT:      Head: Normocephalic.      Right Ear: Tympanic membrane and external ear normal.      Left Ear: Tympanic membrane and external ear normal.      Nose: No rhinorrhea.      Mouth/Throat:      Pharynx: No posterior oropharyngeal erythema.   Eyes:      General:         Right eye: No discharge.         Left eye: No discharge.      Conjunctiva/sclera: Conjunctivae normal.   Neck:      Vascular: No JVD.   Cardiovascular:      Rate and Rhythm: Normal rate and regular rhythm.      Heart sounds: Normal heart sounds. No murmur heard.     No gallop.   Pulmonary:      Effort: Pulmonary effort is normal. No respiratory distress.      Breath sounds: Normal breath sounds. No wheezing.   Abdominal:      General: There is no distension.      Palpations: Abdomen is soft.      Tenderness: There is no abdominal tenderness. There is no rebound.   Musculoskeletal:         General: No tenderness.      Cervical back: Normal range of motion and neck supple.   Lymphadenopathy:      Cervical: No cervical adenopathy.   Skin:     General: Skin is warm.      Findings: No rash.   Neurological:      Mental Status: She is alert and oriented to person, place, and time.         Melchor Epstein MD

## 2024-10-19 NOTE — ASSESSMENT & PLAN NOTE
New diagnosis symptomatic recommend to start Prozac 10 mg once a day proper use and possible side effects discussed the patient    Orders:    FLUoxetine (PROzac) 10 mg capsule; Take 1 capsule (10 mg total) by mouth daily

## 2024-10-19 NOTE — ASSESSMENT & PLAN NOTE
New diagnosis symptomatic recent urgent care visit UA and CNS negative recommend to be evaluated by OB/GYN    Orders:    Ambulatory Referral to Urogynecology; Future

## 2024-10-22 ENCOUNTER — TELEPHONE (OUTPATIENT)
Age: 24
End: 2024-10-22

## 2024-10-22 NOTE — TELEPHONE ENCOUNTER
PA for FLUoxetine HCl 10MG Capsule NOT REQUIRED     Reason:      Pharmacy advised by    [x]Fax  []Phone call

## 2024-10-22 NOTE — TELEPHONE ENCOUNTER
PA for FLUoxetine HCl 10MG Capsule SUBMITTED     via    [x]CM-KEY: [id number]  []Surescripts-Case ID #   []Availity-Auth ID # NDC #   []Faxed to plan   []Other website   []Phone call Case ID #     Office notes sent, clinical questions answered. Awaiting determination    Turnaround time for your insurance to make a decision on your Prior Authorization can take 7-21 business days.

## 2024-10-29 ENCOUNTER — TELEPHONE (OUTPATIENT)
Age: 24
End: 2024-10-29

## 2024-10-29 NOTE — TELEPHONE ENCOUNTER
Rcvd call from [facility] needs a pre-authorization.  Key # [id number]     Sent to pre-auth dept.

## 2024-10-29 NOTE — TELEPHONE ENCOUNTER
PA for Zepbound 7.5MG/0.5ML SUBMITTED     via    [x]CMM-KEY: [id number]  []Surescripts-Case ID #   []Availity-Auth ID # NDC #   []Faxed to plan   []Other website   []Phone call Case ID #     Office notes sent, clinical questions answered. Awaiting determination    Turnaround time for your insurance to make a decision on your Prior Authorization can take 7-21 business days.

## 2024-10-30 NOTE — TELEPHONE ENCOUNTER
PA for Zepbound 7.5MG/0.5ML APPROVED     Date(s) approved: 10/30/2024 - 04/30/2025    Patient advised by          [x]MyChart Message  []Phone call   []LMOM  []L/M to call office as no active Communication consent on file  []Unable to leave detailed message as VM not approved on Communication consent       Pharmacy advised by    [x]Fax  []Phone call    Approval letter scanned into [employer] Yes

## 2024-11-22 ENCOUNTER — OFFICE VISIT (OUTPATIENT)
Dept: FAMILY MEDICINE CLINIC | Facility: CLINIC | Age: 24
End: 2024-11-22
Payer: MEDICARE

## 2024-11-22 VITALS
WEIGHT: 240.2 LBS | BODY MASS INDEX: 40.02 KG/M2 | OXYGEN SATURATION: 98 % | SYSTOLIC BLOOD PRESSURE: 122 MMHG | TEMPERATURE: 97 F | HEIGHT: 65 IN | DIASTOLIC BLOOD PRESSURE: 80 MMHG | HEART RATE: 103 BPM

## 2024-11-22 DIAGNOSIS — K21.9 GASTROESOPHAGEAL REFLUX DISEASE, UNSPECIFIED WHETHER ESOPHAGITIS PRESENT: ICD-10-CM

## 2024-11-22 DIAGNOSIS — E55.9 VITAMIN D DEFICIENCY: ICD-10-CM

## 2024-11-22 DIAGNOSIS — E66.01 MORBID OBESITY (HCC): Primary | ICD-10-CM

## 2024-11-22 PROCEDURE — 99203 OFFICE O/P NEW LOW 30 MIN: CPT | Performed by: FAMILY MEDICINE

## 2024-11-22 RX ORDER — TIRZEPATIDE 10 MG/.5ML
10 INJECTION, SOLUTION SUBCUTANEOUS WEEKLY
Qty: 2 ML | Refills: 0 | Status: SHIPPED | OUTPATIENT
Start: 2024-11-22

## 2024-11-24 NOTE — ASSESSMENT & PLAN NOTE
Chronic ,well controlled   Anti -reflex measures :  · Raise the head of the bed 4 to 6 inches.   · Avoid smoking, alcohol  · Avoid excess coffee, tea or other caffeinated beverages.   · Avoid garments that fit tightly through the abdomen.   · Avoid eating before bed.   · Weight loss is beneficial.    Orders:    CBC and differential; Future    Basic metabolic panel; Future    Lipid panel; Future    TSH, 3rd generation with Free T4 reflex; Future

## 2024-11-24 NOTE — ASSESSMENT & PLAN NOTE
Chronic asymptomatic continue vitamin D supplement vitamin D rich diet discussed with the patient    Orders:    CBC and differential; Future    Basic metabolic panel; Future    Lipid panel; Future    TSH, 3rd generation with Free T4 reflex; Future

## 2024-11-24 NOTE — ASSESSMENT & PLAN NOTE
Chronic patient currently on Zepbound recommend to increase it to 10 mg well 1 injection a week proper use and possible side effect review discussed portion control and low-carb diet    Orders:    tirzepatide (Zepbound) 10 mg/0.5 mL auto-injector; Inject 0.5 mL (10 mg total) under the skin once a week    CBC and differential; Future    Basic metabolic panel; Future    Lipid panel; Future    TSH, 3rd generation with Free T4 reflex; Future

## 2024-11-24 NOTE — PROGRESS NOTES
Name: Pamella Stein      : 2000      MRN: [id number]  Encounter Provider: Melchor Epstein MD  Encounter Date: 2024   Encounter department: [facility]      Assessment & Plan  Morbid obesity (HCC)  Chronic patient currently on Zepbound recommend to increase it to 10 mg well 1 injection a week proper use and possible side effect review discussed portion control and low-carb diet    Orders:    tirzepatide (Zepbound) 10 mg/0.5 mL auto-injector; Inject 0.5 mL (10 mg total) under the skin once a week    CBC and differential; Future    Basic metabolic panel; Future    Lipid panel; Future    TSH, 3rd generation with Free T4 reflex; Future    Gastroesophageal reflux disease, unspecified whether esophagitis present  Chronic ,well controlled   Anti -reflex measures :  · Raise the head of the bed 4 to 6 inches.   · Avoid smoking, alcohol  · Avoid excess coffee, tea or other caffeinated beverages.   · Avoid garments that fit tightly through the abdomen.   · Avoid eating before bed.   · Weight loss is beneficial.    Orders:    CBC and differential; Future    Basic metabolic panel; Future    Lipid panel; Future    TSH, 3rd generation with Free T4 reflex; Future    Vitamin D deficiency  Chronic asymptomatic continue vitamin D supplement vitamin D rich diet discussed with the patient    Orders:    CBC and differential; Future    Basic metabolic panel; Future    Lipid panel; Future    TSH, 3rd generation with Free T4 reflex; Future         History of Present Illness     Patient here follow-up with a chronic condition compliant with the medication tolerated well without side effect patient known to have history of morbid obesity she is currently on Zepbound titrated up she is currently on the dose 7.5 mg once a week she feel it did help in the beginning but now her weight is stuck no nausea no vomiting no abdomen pain      Review of Systems   Constitutional:  Negative for chills and  "fever.   HENT:  Negative for ear pain and sore throat.    Eyes:  Negative for pain and visual disturbance.   Respiratory:  Negative for cough and shortness of breath.    Cardiovascular:  Negative for chest pain and palpitations.   Gastrointestinal:  Negative for abdominal pain, constipation, diarrhea and vomiting.   Genitourinary:  Negative for dysuria and hematuria.   Skin:  Negative for color change and rash.   Neurological:  Negative for seizures and syncope.   All other systems reviewed and are negative.    Objective     /80 (BP Location: Left arm, Patient Position: Sitting)   Pulse 103   Temp (!) 97 °F (36.1 °C) (Tympanic)   Ht 5' 4.5\" (1.638 m)   Wt 109 kg (240 lb 3.2 oz)   SpO2 98%   BMI 40.59 kg/m²     Physical Exam  Vitals and nursing note reviewed.   Constitutional:       General: She is not in acute distress.     Appearance: She is well-developed. She is not diaphoretic.   HENT:      Head: Normocephalic.      Right Ear: Tympanic membrane and external ear normal.      Left Ear: Tympanic membrane and external ear normal.      Nose: No rhinorrhea.      Mouth/Throat:      Pharynx: No posterior oropharyngeal erythema.   Eyes:      General:         Right eye: No discharge.         Left eye: No discharge.      Conjunctiva/sclera: Conjunctivae normal.   Neck:      Vascular: No JVD.   Cardiovascular:      Rate and Rhythm: Normal rate and regular rhythm.      Heart sounds: Normal heart sounds. No murmur heard.     No gallop.   Pulmonary:      Effort: Pulmonary effort is normal. No respiratory distress.      Breath sounds: Normal breath sounds. No wheezing.   Abdominal:      General: There is no distension.      Palpations: Abdomen is soft.      Tenderness: There is no abdominal tenderness. There is no rebound.   Musculoskeletal:         General: No tenderness.      Cervical back: Normal range of motion and neck supple.   Lymphadenopathy:      Cervical: No cervical adenopathy.   Skin:     General: Skin " is warm.      Findings: No rash.   Neurological:      Mental Status: She is alert and oriented to person, place, and time.         Melchor Epstein MD

## 2024-11-25 NOTE — PROGRESS NOTES
Gastroenterology Outpatient Follow-up - Ulcerative Colitis  Pamella Stein 24 y.o. female MRN: [id number]  Encounter: [id number]    Pamella Stein is a 24 y.o. female with Ulcerative colitis.    Symptom onset:     Diagnosis:  ulcerative colitis  Year of diagnosis:  2014    IBD Summary: Pamella has left-sided ulcerative colitis with pancolitis on biopsies, and has already been on infliximab, adalimumab, ustekinumab, and ozanimod with loss of response or primary nonresponse or adverse events.  She has had significant steroid exposure.  We are starting upadacitinib in 10/2023.    Ulcerative Colitis Summary  Macroscopic extent of diseases: ulcerative proctitis  Microscopic extent of diseases:  pancolitis  Has the patient ever been hospitalized for severe disease: Yes        Surgical History  Number of IBD surgeries: 0      First IBD surgery:    Most Recent IBD surgery:    Esophageal:  0    Gastroduodenal:  0    Small bowel resection(s):  0    Ileocolonic resection(s): 0      Colonic resection(s):  0    Ileostomy or colostomy:  no previous ostomy    Complete colectomy:  No         Medications     Year last used Reason for discontinuation   Corticosteroids prior   2023 OK     Thiopurines   never         Methotrexate   never         Infliximab prior   2015 AE infusion reation   Adalimumab prior   2017 JENNY anti-ADA   Certolizumab   never         Golimumab   never         Natalizumab   never         Mesalamine   never         Sulfasalzine   never         Vedolizumab   never         Ustekinumab prior   2023 JENNY     Tofacitinib   never         Other biologic prior   2023 AE Ozanimod - blurry vision and light sensitivity       Extraintestinal Manifestations    IBD-associated arthropathy Yes    Uveitis No    Oral aphthous ulcers No   Erythema nodosum No    Pyoderma gangrenosum No    Primary sclerosing cholangitis No    Thrombotic complications No          Cancer / Dysplasia History  History of IBD-associated dysplasia: none     Date of diagnosis (Year):     History of colorectal cancer: No   History of cervical dysplasia: No   History of skin cancer: none         Laboratory Data   Most recent (date) Result   PPD   unknown   Quanitferon gold 2/7/2020 negative   TPMT   unknown   Hepatitis A 2/7/2020 positive   HBsAb 2/7/2020 negative   HBcAb 2/7/2020 negative   HBsAg 2/7/2020 negative   HCV Ab 2/7/2020 negative       Imaging / Diagnostic Procedures   Most recent (date) Findings   Colonoscopy or sigmoidoscopy #1 2/7/2023            Ulcers/Erosions  no erosions           Strictures  none           Stricture Severity  N/A           Endoscopic Score Vargas Score:    Rutgeert's:  N/A            Findings  Digital rectal examination showed medium sized internal hemorrhoids. Mild erythema and distortion of normal vasculature in rectum without ulceration and without friability (vargas endoscopic subscore 1). The remainder of the colon and terminal ileum appeared normal. Biopsies taken. Prep was fair.           Pathology  A. TERMINAL ILEUM, BIOPSY:   Ileal mucosa with no active inflammation seen.   Negative for dysplasia.   B. ASCENDING COLON, BIOPSY:   Focal minimal active colitis.   Negative for dysplasia.   C. TRANSVERSE, BIOPSY:   Colonic mucosa with no active colitis seen.   Negative for dysplasia:   D. LEFT COLON, BIOPSY:   Colonic mucosa with no active colitis seen.   Negative for dysplasia.   E. RECTUM COLON, BIOPSY:   Active chronic colitis, moderately severe.   Negative for dysplasia.    Colonoscopy or sigmoidoscopy #2              Ulcers/Erosions                 Strictures                 Stricture Severity              Endoscopic Score Vargas Score:    Rugeert's:              Findings              Pathology      EGD       Small bowel follow-through       CT enterography       CT without enterography 7/24/2023 Mild distal colonic wall thickening.   MRI enterography       Capsule study       DEXA scan   Lowest Z-score:      CXR (for TB)            HPI:   Interval History:  11/26/2024 Follow up  Pamella remains on upadacitinib 30 mg daily.  She is doing well.  Bowel function is at baseline. She is up to date with Prevnar and Shingrix; still needs influenza vaccine.  Will see Gyn in February.  Also needs to see Derm.  She is taking Zepbound.    Last colonoscopy was in February 2023 when she still had active distal disease. Labs from 7/25/2020 with normal CBC with hemoglobin 14.7 and MCV 88.  CMP with normal creatinine 0.77.  ALT and AST were elevated to 84 and 72, respectively.  Alk phos and bilirubin normal.  LFTs have not been repeated.  B12 level in June was normal.  Vitamin D was low normal at 35.7.  Ferritin was normal at 75 and iron saturation was normal at 31%.    6/3/2024 Follow up  She is doing well on Rinvoq 30 mg daily.  She has good and bad days, but mostly good days. Took some time to adjust to the lower dose.  Overall, normal bowel function.  Some abdominal cramping, takes dicyclomine.  Having a lot of GERD in the morning.  She is on Wegovy.  Not on PPI anymore.  Drinking a lot of water.  She got the Shingrix vaccines.  Has not seen Derm.  Needs Gyn appointment.      No recent blood work.  Calprotectin was last checked in February and was very elevated at 6660.  She also had C. diff by PCR at that time.    12/11/2023 Follow up  Since last visit, she started upadacitinib and tapered off steroids 2 weeks ago.  Also restarted Wegovy.  She has improved stool consistency, less urgency, less bleeding.  Feels that upadacitinib is working very well.  Had some constipation and taking Miralax.   Gets some cramping when she is anxious.  Got flu vaccine.  Also got Shingrix x2.  Also got Prevnar 20.    Has new GERD and burping since starting Wegovy.    Needs annual pap smear and Derm.      9/8/2023 Initial visit  Pamella Stein is establishing care with me for left-sided ulcerative colitis.  Has been managed at [facility].  She was diagnosed in 2014 after she  started having rectal bleeding, diarrhea, and abdominal cramping.  After a month of the symptoms, she became lightheaded and weak and had to be hospitalized.  She also lost a lot of weight.  She required a blood transfusion in the hospital and had a colonoscopy.  She cannot recall whether she was diagnosed with ulcerative colitis or Crohn's disease, but it sounds like left-sided UC based on available records.  She is quite certain that she never had small bowel involvement.  She was initially treated with IV steroids, then infliximab was started in 2014.  She did well with infliximab, but unfortunately had a severe infusion reaction with hives and respiratory distress after less than a year.  She then went back on prednisone, then transition to adalimumab.  Adalimumab worked for about 18 months, then she lost response around 2017 and was told that she had developed antiadalimumab antibodies.  She again was put on steroids, then ustekinumab was started in 2019.  She did very well with ustekinumab, but unfortunately flared up again and colonoscopy in 2/2023 showed Vargas 1 disease in the rectum.  At that time, she was already on every 4 week dosing of ustekinumab.  Therefore, she was put back on steroids and started ozanimod in April 2023.  After 10 weeks of ozanimod, she developed blurry vision and light sensitivity, so the treatment was interrupted in May.  She has not yet seen ophthalmology, but fortunately her vision is improved.  In addition, she felt that, with ozanimod, she was not able to get off steroids.  She has now been on prednisone for the past 7 months.  Current prednisone dose is 10 mg.  She has gained 20 pounds since February from being on steroids.  She is also having joint pain.  She also has diabetes and has been on metformin for the past 2 years.  She started Wegovy, but it is no longer available. Has chronic nausea.  Currently having watery diarrhea with bleeding and urgency.  History of C.  difficile in 2021, negative test on 8/7/2023.  Hasn't been able to work this year because of her IBD.  Non smoker.  No FH of IBD.  She may have HS - describes pustular lesions in axillae and groin - and saw Derm a few years ago; will be seeing Derm in October again for hair loss.  Last pap smear was a year ago.  Has had 1 Shingrix vaccine.  No flu shto this year. No COVID booster. No recent pneumonia vaccines.  Infectious studies from 8/7/2023 were negative.    Pamella reports the following symptoms over the last 7 days:    Stool Frequency normal   Average stools per day: 2   Average liquid stools per day: 0   Consistency of bowel: formed   Awakening from sleep to move bowels? No   Urgency mild fecal urgency   Visible blood in stool? none   Abdominal pain? none   General Wellbeing? generally well     Pamella also reports the following symptoms in the last month:    Leakage of stool while sleeping? No   Leakage of stool while awake? No       REVIEW OF SYSTEMS:  During the last 7 days, Pamella experienced the following symptoms:  Unintentional Weight Loss (in the last month) No   Fever No   Eye irritation No   Mouth sores No   Sore throat No   Chest pain No   Shortness of breath No   Numbness or tingling in hands or feet No   Skin rash No   Pain or swelling in joints No   Bruising or bleeding No   Felt depressed or blue No   Fatigue No   Dysuria No   Please see HPI for additional pertinent review of systems; otherwise remainder of ROS was unremarkable    MEDICATIONS:    Current Outpatient Medications:     Calcium Carb-Cholecalciferol 600-25 MG-MCG TABS    Calcium Carbonate 1500 (600 Ca) MG TABS    cholecalciferol (VITAMIN D3) 1,000 units tablet    ferrous sulfate 325 (65 FE) MG EC tablet    FLUoxetine (PROzac) 10 mg capsule    metFORMIN (GLUCOPHAGE-XR) 500 mg 24 hr tablet    Multiple Vitamins-Minerals (HAIR SKIN AND NAILS FORMULA PO)    omeprazole (PriLOSEC) 40 MG capsule    ondansetron (Zofran ODT) 4 mg  "disintegrating tablet    Rinvoq 30 MG TB24    tirzepatide (Zepbound) 10 mg/0.5 mL auto-injector    ALLERGIES:  Allergies   Allergen Reactions    Infliximab Hives     Difficulty breathing as well     Mushroom Extract Complex - Food Allergy Hives     Breaks out hives, only when she eats it.       OBJECTIVE:  /60 (BP Location: Left arm, Patient Position: Sitting, Cuff Size: Large)   Pulse 90   Temp (!) 97.2 °F (36.2 °C) (Tympanic)   Ht 5' 4\" (1.626 m)   Wt 108 kg (238 lb 6.4 oz)   SpO2 99%   BMI 40.92 kg/m²      PHYSICAL EXAM:    General Appearance:   Alert, cooperative, no distress   HEENT:   Normocephalic, atraumatic, anicteric.     Neck:  Supple, symmetrical, trachea midline   Lungs:   Clear to auscultation bilaterally; no rales, rhonchi or wheezing; respirations unlabored    Heart::   Regular rate and rhythm; no murmur, rub, or gallop.   Abdomen:   Soft, non-distended; normal bowel sounds    Abdominal exam was notable for no tenderness with no mass.     Genitalia:   Deferred    Rectal:   Perirectal assessment was not performed.                     Extremities:  No cyanosis, clubbing or edema    Pulses:  2+ and symmetric    Skin:  No jaundice, rashes, or lesions    Lymph nodes:  No palpable cervical lymphadenopathy        Lab Results   Component Value Date    WBC 8.66 06/13/2024    HGB 13.8 06/13/2024    HCT 42.1 06/13/2024    MCV 94 06/13/2024     (H) 06/13/2024     Lab Results   Component Value Date    SODIUM 134 (L) 07/25/2024    K 3.9 07/25/2024     07/25/2024    CO2 27 07/25/2024    AGAP 6 07/25/2024    BUN 18 07/25/2024    CREATININE 0.77 07/25/2024    GLUC 90 07/25/2024    GLUF 87 06/13/2024    CALCIUM 8.8 07/25/2024    AST 72 (H) 07/25/2024    ALT 84 (H) 07/25/2024    ALKPHOS 78 07/25/2024    TP 7.2 07/25/2024    TBILI 0.6 07/25/2024    EGFR 109 07/25/2024     Lab Results   Component Value Date    CRP 7.7 (H) 06/13/2024     Lab Results   Component Value Date    SGLAEDCH13 531 " 06/13/2024     Lab Results   Component Value Date    FERRITIN 75 06/13/2024       ASSESSMENT AND PLAN:    Pamella has a history of macroscopic proctitis and microscopic pancolitis  ulcerative colitis diagnosed in 2014. Current medical therapy is with upadacitinib 30 mg daily. My global assessment is that the clinical disease is currently quiescent.     The 6-point Vargas score was 0 and the 9-point Vargas score was 0.  The short CDAI was 44.      Pamella has left-sided ulcerative colitis with pan-colitis on biopsies.  She was treated with steroids, infliximab, adalimumab, ustekinumab, and ozanimod and either did not respond or had side effects.  She is now in clinical remission on upadacitinib 30 mg/day.  Her last labs showed mild transaminase elevation.  Overall, she is doing well.  She is on a GLP-1 agonist for weight loss and had some GERD issues which are controlled with omeprazole.  1. Continue upadacitinib 30 mg daily.  2. Continue omeprazole 40 mg daily.  3. Repeat CBC, CMP CRP.  4. Continue vitamin D supplement and recheck level.    5. Annual GYN exam.  6. Referral to dermatology.  7. Encouraged her to get the flu vaccine.  She is up-to-date with Prevnar.  Return return in 6 months.      Health Maintenance Recommendations:  Vaccines & Infections  COVID-19 vaccination and boosters are recommended. There is no evidence that the COVID-19 vaccine would cause an IBD flare.  Avoid live vaccines if on immunosuppressive therapy.  Yearly influenza vaccine (flu shot).  Pneumonia vaccines for patients on immunosuppression. These include Prevnar 20, followed by Pneumovax 23 at least 8 weeks later.  Shingrix vaccine (series of 2 injections) for al patients 65 and older. Patients on tofacitinib or upadacitinib should be vaccinated regardless of age.  If not immune to measles mumps or rubella, MMR vaccine is recommended. However, this is a live vaccine and should be given prior to immunosuppressive therapy.  HPV vaccination as  per national guidelines.  Hepatitis A and B vaccinations if not previously vaccinated.  Testing for tuberculosis with QuantiFERON Gold blood test and/or chest xray prior to starting immunosuppressive medications, and then annually    Cancer screening  Dysplasia surveillance for colorectal cancer. Colonoscopy in all patients with extensive colitis (more than 1/3 of the colon involved) who had disease for at least 8 or more years.  Repeat colonoscopy approximately every 12-24 months.  In patients with concurrent primary sclerosing cholangitis, history of dysplasia, or family history of colon cancer, repeat colonoscopy annually.    Females: Pap smear annually for woman on immunosuppression.  Annual dermatologic/skin exam in all patients with IBD, especially those on immunosuppression with thiopurines or GERARDO inhibitors.    Miscellaneous  DEXA scan, once off steroids for 3 months  Depression screening recommended annually  Routine dental and ophthalmology examinations    Problem List Items Addressed This Visit    None  Visit Diagnoses         Ulcerative rectosigmoiditis with complication (HCC)    -  Primary    Relevant Orders    Ambulatory Referral to Dermatology    CBC and differential    Comprehensive metabolic panel    C-reactive protein    Vitamin D 25 hydroxy      Immunosuppression due to drug therapy  (HCC)        Relevant Orders    Ambulatory Referral to Dermatology            Radha Montoya MD

## 2024-11-26 ENCOUNTER — OFFICE VISIT (OUTPATIENT)
Age: 24
End: 2024-11-26
Payer: MEDICARE

## 2024-11-26 VITALS
HEIGHT: 64 IN | BODY MASS INDEX: 40.7 KG/M2 | DIASTOLIC BLOOD PRESSURE: 60 MMHG | OXYGEN SATURATION: 99 % | HEART RATE: 90 BPM | WEIGHT: 238.4 LBS | SYSTOLIC BLOOD PRESSURE: 128 MMHG | TEMPERATURE: 97.2 F

## 2024-11-26 DIAGNOSIS — K21.9 GASTROESOPHAGEAL REFLUX DISEASE, UNSPECIFIED WHETHER ESOPHAGITIS PRESENT: ICD-10-CM

## 2024-11-26 DIAGNOSIS — Z79.899 IMMUNOSUPPRESSION DUE TO DRUG THERAPY  (HCC): ICD-10-CM

## 2024-11-26 DIAGNOSIS — D84.821 IMMUNOSUPPRESSION DUE TO DRUG THERAPY  (HCC): ICD-10-CM

## 2024-11-26 DIAGNOSIS — E55.9 VITAMIN D DEFICIENCY: ICD-10-CM

## 2024-11-26 DIAGNOSIS — K51.319 ULCERATIVE RECTOSIGMOIDITIS WITH COMPLICATION (HCC): Primary | ICD-10-CM

## 2024-11-26 PROCEDURE — 99214 OFFICE O/P EST MOD 30 MIN: CPT | Performed by: INTERNAL MEDICINE

## 2024-11-29 ENCOUNTER — TELEPHONE (OUTPATIENT)
Age: 24
End: 2024-11-29

## 2024-11-29 NOTE — TELEPHONE ENCOUNTER
Patient called the office c/o of lose stools and bad GI cramping while taking Zepbound. Pt would like to know what she should do. Please review and advise.

## 2024-12-02 ENCOUNTER — TELEPHONE (OUTPATIENT)
Dept: GASTROENTEROLOGY | Facility: CLINIC | Age: 24
End: 2024-12-02

## 2024-12-07 DIAGNOSIS — F41.1 GAD (GENERALIZED ANXIETY DISORDER): ICD-10-CM

## 2024-12-09 RX ORDER — FLUOXETINE 10 MG/1
10 CAPSULE ORAL DAILY
Qty: 30 CAPSULE | Refills: 1 | Status: SHIPPED | OUTPATIENT
Start: 2024-12-09

## 2024-12-17 ENCOUNTER — OFFICE VISIT (OUTPATIENT)
Dept: FAMILY MEDICINE CLINIC | Facility: CLINIC | Age: 24
End: 2024-12-17
Payer: MEDICARE

## 2024-12-17 VITALS
HEIGHT: 65 IN | WEIGHT: 240 LBS | TEMPERATURE: 98.6 F | OXYGEN SATURATION: 99 % | SYSTOLIC BLOOD PRESSURE: 110 MMHG | HEART RATE: 95 BPM | BODY MASS INDEX: 39.99 KG/M2 | DIASTOLIC BLOOD PRESSURE: 68 MMHG

## 2024-12-17 DIAGNOSIS — Z00.01 ENCOUNTER FOR GENERAL ADULT MEDICAL EXAMINATION WITH ABNORMAL FINDINGS: Primary | ICD-10-CM

## 2024-12-17 DIAGNOSIS — E55.9 VITAMIN D DEFICIENCY: ICD-10-CM

## 2024-12-17 DIAGNOSIS — E66.01 MORBID OBESITY (HCC): ICD-10-CM

## 2024-12-17 DIAGNOSIS — Z13.29 THYROID DISORDER SCREEN: ICD-10-CM

## 2024-12-17 DIAGNOSIS — K21.9 GASTROESOPHAGEAL REFLUX DISEASE, UNSPECIFIED WHETHER ESOPHAGITIS PRESENT: ICD-10-CM

## 2024-12-17 DIAGNOSIS — Z13.220 SCREENING, LIPID: ICD-10-CM

## 2024-12-17 PROCEDURE — 99213 OFFICE O/P EST LOW 20 MIN: CPT | Performed by: FAMILY MEDICINE

## 2024-12-17 PROCEDURE — 99395 PREV VISIT EST AGE 18-39: CPT | Performed by: FAMILY MEDICINE

## 2024-12-17 RX ORDER — OMEPRAZOLE 40 MG/1
40 CAPSULE, DELAYED RELEASE ORAL DAILY
Qty: 90 CAPSULE | Refills: 1 | Status: SHIPPED | OUTPATIENT
Start: 2024-12-17

## 2024-12-17 NOTE — ASSESSMENT & PLAN NOTE
Chronic BMI today 40.25 currently on Zepbound 10 mg injection weekly continue  Orders:  •  Basic metabolic panel; Future  •  CBC and differential; Future  •  Iron; Future  •  Vitamin B12; Future

## 2024-12-17 NOTE — PROGRESS NOTES
Adult Annual Physical  Name: Pamella Stein      : 2000      MRN: [id number]  Encounter Provider: Melchor Epstein MD  Encounter Date: 2024   Encounter department: [facility]    Assessment & Plan  Encounter for general adult medical examination with abnormal findings  Advice and education were given regarding nutrition, aerobic exercises, weight-bearing exercises, cardiovascular risk reduction, fall risk reduction, and age-appropriate supplements.     The patient was counseled regarding instructions for management, risk factor reductions, prognosis, risks and benefits of treatment options, patient and family education, and importance of compliance with treatment.  Patient already scheduled appointment with GYN for cervical cancer screening in 2025 patient declined flu shot for today she can wait until after the holiday  Orders:  •  Basic metabolic panel; Future  •  CBC and differential; Future  •  Iron; Future  •  Vitamin B12; Future    Morbid obesity (HCC)  Chronic BMI today 40.25 currently on Zepbound 10 mg injection weekly continue  Orders:  •  Basic metabolic panel; Future  •  CBC and differential; Future  •  Iron; Future  •  Vitamin B12; Future    Gastroesophageal reflux disease, unspecified whether esophagitis present  Chronic symptomatic patient out of the omeprazole for the last couple days and symptom recurrence recommend to take omeprazole 40 mg proper use discussed avoid provoke food do not eat and lie down  Orders:  •  omeprazole (PriLOSEC) 40 MG capsule; Take 1 capsule (40 mg total) by mouth daily  •  Basic metabolic panel; Future  •  CBC and differential; Future  •  Iron; Future  •  Vitamin B12; Future    Vitamin D deficiency    Orders:  •  Iron; Future  •  Vitamin B12; Future  •  Vitamin D 25 hydroxy; Future    Screening, lipid    Orders:  •  Lipid Panel with Direct LDL reflex; Future    Thyroid disorder screen    Orders:  •  TSH, 3rd  generation with Free T4 reflex; Future    Immunizations and preventive care screenings were discussed with patient today. Appropriate education was printed on patient's after visit summary.    Counseling:  Alcohol/drug use: discussed moderation in alcohol intake, the recommendations for healthy alcohol use, and avoidance of illicit drug use.  Dental Health: discussed importance of regular tooth brushing, flossing, and dental visits.  Injury prevention: discussed safety/seat belts, safety helmets, smoke detectors, carbon monoxide detectors, and smoking near bedding or upholstery.  Sexual health: discussed sexually transmitted diseases, partner selection, use of condoms, avoidance of unintended pregnancy, and contraceptive alternatives.  Exercise: the importance of regular exercise/physical activity was discussed. Recommend exercise 3-5 times per week for at least 30 minutes.          History of Present Illness   Patient here for well exam and she concerned about heartburn she been having the symptoms and out of the omeprazole more than 1 week ago and she is start having the symptom no vomiting no diarrhea no abdomen distention past medical surgical family and social history review    Adult Annual Physical:  Patient presents for annual physical.     Diet and Physical Activity:  - Diet/Nutrition: portion control.  - Exercise: 3-4 times a week on average.    Depression Screening:  - PHQ-2 Score: 0    General Health:  - Sleep: sleeps well.  - Hearing: normal hearing bilateral ears.  - Vision: wears glasses.  - Dental: regular dental visits.    /GYN Health:  - Follows with GYN: yes.   - Menopause: premenopausal.     Review of Systems   Constitutional:  Negative for chills and fever.   HENT:  Negative for ear pain and sore throat.    Eyes:  Negative for pain and visual disturbance.   Respiratory:  Negative for cough and shortness of breath.    Cardiovascular:  Negative for chest pain and palpitations.   Gastrointestinal:  " Negative for abdominal pain, constipation, diarrhea and vomiting.   Genitourinary:  Negative for dysuria and hematuria.   Musculoskeletal:  Negative for arthralgias and back pain.   Skin:  Negative for color change and rash.   Neurological:  Negative for seizures and syncope.   All other systems reviewed and are negative.        Objective   /68 (BP Location: Left arm, Patient Position: Sitting)   Pulse 95   Temp 98.6 °F (37 °C) (Tympanic)   Ht 5' 4.75\" (1.645 m)   Wt 109 kg (240 lb)   LMP 12/14/2024 (Exact Date)   SpO2 99%   Breastfeeding No   BMI 40.25 kg/m²     Physical Exam  Vitals and nursing note reviewed.   Constitutional:       General: She is not in acute distress.     Appearance: She is well-developed. She is not diaphoretic.   HENT:      Head: Normocephalic.      Right Ear: Tympanic membrane and external ear normal.      Left Ear: Tympanic membrane and external ear normal.      Nose: No rhinorrhea.      Mouth/Throat:      Pharynx: No posterior oropharyngeal erythema.   Eyes:      General:         Right eye: No discharge.         Left eye: No discharge.      Conjunctiva/sclera: Conjunctivae normal.   Neck:      Vascular: No JVD.   Cardiovascular:      Rate and Rhythm: Normal rate and regular rhythm.      Heart sounds: Normal heart sounds. No murmur heard.     No gallop.   Pulmonary:      Effort: Pulmonary effort is normal. No respiratory distress.      Breath sounds: Normal breath sounds. No wheezing.   Abdominal:      General: There is no distension.      Palpations: Abdomen is soft.      Tenderness: There is no abdominal tenderness. There is no rebound.   Musculoskeletal:         General: No tenderness.      Cervical back: Normal range of motion and neck supple.   Lymphadenopathy:      Cervical: No cervical adenopathy.   Skin:     General: Skin is warm.      Findings: No rash.   Neurological:      Mental Status: She is alert and oriented to person, place, and time.         "

## 2024-12-17 NOTE — ASSESSMENT & PLAN NOTE
Advice and education were given regarding nutrition, aerobic exercises, weight-bearing exercises, cardiovascular risk reduction, fall risk reduction, and age-appropriate supplements.     The patient was counseled regarding instructions for management, risk factor reductions, prognosis, risks and benefits of treatment options, patient and family education, and importance of compliance with treatment.  Patient already scheduled appointment with GYN for cervical cancer screening in February 2025 patient declined flu shot for today she can wait until after the holiday  Orders:  •  Basic metabolic panel; Future  •  CBC and differential; Future  •  Iron; Future  •  Vitamin B12; Future

## 2024-12-17 NOTE — ASSESSMENT & PLAN NOTE
Chronic symptomatic patient out of the omeprazole for the last couple days and symptom recurrence recommend to take omeprazole 40 mg proper use discussed avoid provoke food do not eat and lie down  Orders:  •  omeprazole (PriLOSEC) 40 MG capsule; Take 1 capsule (40 mg total) by mouth daily  •  Basic metabolic panel; Future  •  CBC and differential; Future  •  Iron; Future  •  Vitamin B12; Future

## 2024-12-26 DIAGNOSIS — E66.01 MORBID OBESITY (HCC): ICD-10-CM

## 2024-12-26 RX ORDER — TIRZEPATIDE 10 MG/.5ML
10 INJECTION, SOLUTION SUBCUTANEOUS WEEKLY
Qty: 2 ML | Refills: 0 | Status: SHIPPED | OUTPATIENT
Start: 2024-12-26

## 2024-12-26 NOTE — TELEPHONE ENCOUNTER
Reason for call:   [x] Refill   [] Prior Auth  [] Other:     Office:   [x] PCP/Provider -   [] Specialty/Provider -     Medication: Zepbound    Dose/Frequency: 10 mg    Quantity: 2 mL    Pharmacy: [facility] - [facility] Villalobos - [address]     Does the patient have enough for 3 days?   [x] Yes   [] No - Send as HP to POD

## 2024-12-27 DIAGNOSIS — F41.1 GAD (GENERALIZED ANXIETY DISORDER): ICD-10-CM

## 2024-12-27 RX ORDER — FLUOXETINE 10 MG/1
10 CAPSULE ORAL DAILY
Qty: 30 CAPSULE | Refills: 1 | Status: SHIPPED | OUTPATIENT
Start: 2024-12-27

## 2025-01-09 DIAGNOSIS — E55.9 VITAMIN D DEFICIENCY: ICD-10-CM

## 2025-01-10 RX ORDER — CHOLECALCIFEROL (VITAMIN D3) 25 MCG
1000 TABLET ORAL EVERY MORNING
Qty: 200 TABLET | Refills: 1 | Status: SHIPPED | OUTPATIENT
Start: 2025-01-10

## 2025-01-21 ENCOUNTER — PATIENT MESSAGE (OUTPATIENT)
Dept: FAMILY MEDICINE CLINIC | Facility: CLINIC | Age: 25
End: 2025-01-21

## 2025-01-21 DIAGNOSIS — E66.01 MORBID OBESITY (HCC): Primary | ICD-10-CM

## 2025-01-23 RX ORDER — TIRZEPATIDE 12.5 MG/.5ML
12.5 INJECTION, SOLUTION SUBCUTANEOUS WEEKLY
Qty: 2 ML | Refills: 0 | Status: SHIPPED | OUTPATIENT
Start: 2025-01-23

## 2025-02-15 DIAGNOSIS — E66.01 MORBID OBESITY (HCC): ICD-10-CM

## 2025-02-16 RX ORDER — TIRZEPATIDE 12.5 MG/.5ML
12.5 INJECTION, SOLUTION SUBCUTANEOUS WEEKLY
Qty: 2 ML | Refills: 0 | Status: SHIPPED | OUTPATIENT
Start: 2025-02-16

## 2025-02-17 DIAGNOSIS — K51.319 ULCERATIVE RECTOSIGMOIDITIS WITH COMPLICATION (HCC): ICD-10-CM

## 2025-02-17 DIAGNOSIS — E61.1 IRON DEFICIENCY: ICD-10-CM

## 2025-02-18 RX ORDER — FERROUS SULFATE 325(65) MG
1 TABLET, DELAYED RELEASE (ENTERIC COATED) ORAL
Qty: 90 TABLET | Refills: 1 | Status: SHIPPED | OUTPATIENT
Start: 2025-02-18

## 2025-03-07 DIAGNOSIS — E55.9 VITAMIN D DEFICIENCY: ICD-10-CM

## 2025-03-17 DIAGNOSIS — E66.01 MORBID OBESITY (HCC): ICD-10-CM

## 2025-03-18 RX ORDER — TIRZEPATIDE 12.5 MG/.5ML
12.5 INJECTION, SOLUTION SUBCUTANEOUS WEEKLY
Qty: 2 ML | Refills: 0 | Status: SHIPPED | OUTPATIENT
Start: 2025-03-18

## 2025-03-19 DIAGNOSIS — F41.1 GAD (GENERALIZED ANXIETY DISORDER): ICD-10-CM

## 2025-03-20 RX ORDER — FLUOXETINE 10 MG/1
10 CAPSULE ORAL DAILY
Qty: 30 CAPSULE | Refills: 5 | Status: SHIPPED | OUTPATIENT
Start: 2025-03-20

## 2025-04-11 ENCOUNTER — TELEPHONE (OUTPATIENT)
Age: 25
End: 2025-04-11

## 2025-04-11 DIAGNOSIS — E66.01 MORBID OBESITY (HCC): Primary | ICD-10-CM

## 2025-04-11 NOTE — TELEPHONE ENCOUNTER
Patient called in regards to increasing her Zepbound. Patient stated that she has been eating health but has not seen a difference in her weight. Patient stated that if medication can not be increased can she be changed to Phentermine.

## 2025-04-13 RX ORDER — TIRZEPATIDE 15 MG/.5ML
15 INJECTION, SOLUTION SUBCUTANEOUS WEEKLY
Qty: 2 ML | Refills: 0 | Status: SHIPPED | OUTPATIENT
Start: 2025-04-13

## 2025-05-07 DIAGNOSIS — E66.01 MORBID OBESITY (HCC): ICD-10-CM

## 2025-05-09 ENCOUNTER — TELEPHONE (OUTPATIENT)
Age: 25
End: 2025-05-09

## 2025-05-09 RX ORDER — TIRZEPATIDE 15 MG/.5ML
15 INJECTION, SOLUTION SUBCUTANEOUS WEEKLY
Qty: 2 ML | Refills: 0 | Status: SHIPPED | OUTPATIENT
Start: 2025-05-09 | End: 2025-05-15

## 2025-05-09 NOTE — TELEPHONE ENCOUNTER
PA for Zepbound 15MG/0.5ML to [employer]    via    [x]CMM-KEY: [id number]  []Surescripts-Case ID #   []Availity-Auth ID # NDC #   []Faxed to plan   []Other website   []Phone call Case ID #     [x]PA sent as URGENT    All office notes, labs and other pertaining documents and studies sent. Clinical questions answered. Awaiting determination from insurance company.     Turnaround time for your insurance to make a decision on your Prior Authorization can take 7-21 business days.

## 2025-05-15 ENCOUNTER — TELEPHONE (OUTPATIENT)
Age: 25
End: 2025-05-15

## 2025-05-15 DIAGNOSIS — E66.01 MORBID OBESITY (HCC): Primary | ICD-10-CM

## 2025-05-15 RX ORDER — TIRZEPATIDE 12.5 MG/.5ML
12.5 INJECTION, SOLUTION SUBCUTANEOUS WEEKLY
Qty: 2 ML | Refills: 0 | Status: SHIPPED | OUTPATIENT
Start: 2025-05-15

## 2025-05-15 RX ORDER — ONDANSETRON 4 MG/1
4 TABLET, FILM COATED ORAL EVERY 8 HOURS PRN
Qty: 20 TABLET | Refills: 0 | Status: SHIPPED | OUTPATIENT
Start: 2025-05-15

## 2025-05-15 NOTE — TELEPHONE ENCOUNTER
Patient called, is having a reaction to the increased dose of   Zepbound 15 MG/0.5ML auto-injector     since patient increase dose  Is having bad acid reflux, chest discomfort and diarrhea. Over the counter medications are not helping.     Patient takes injection Wednesday night  And she has had symptoms weekly after taking for the last 4 weeks.     Is looking for advice, she can she do to help lessen the symptoms or can Primary Care Provider recommend a different medication without side effects?  Patients call back# [phone]

## 2025-05-15 NOTE — TELEPHONE ENCOUNTER
Patient calling to ask for clarification on Primary Care Provider message. Warm transferred to nurse to assist.

## 2025-05-15 NOTE — TELEPHONE ENCOUNTER
Patient calling to f/u on Dr. Epstein's new orders from the [employer] message that she sent this am about acid reflux. Explained that Dr. Epstein sent an order for zofran and decreased her zepbound to 12.5 mg. Patient satisfied with that because she has had an increase in acid reflux, gas and diarrhea with higher dosage. Understands the zofran is for nausea, has had nausea as well with the increase in zepbound No further questions.

## 2025-05-27 ENCOUNTER — OFFICE VISIT (OUTPATIENT)
Dept: FAMILY MEDICINE CLINIC | Facility: CLINIC | Age: 25
End: 2025-05-27
Payer: MEDICARE

## 2025-05-27 VITALS
BODY MASS INDEX: 40.39 KG/M2 | HEART RATE: 75 BPM | SYSTOLIC BLOOD PRESSURE: 118 MMHG | HEIGHT: 65 IN | WEIGHT: 242.4 LBS | OXYGEN SATURATION: 95 % | DIASTOLIC BLOOD PRESSURE: 76 MMHG | TEMPERATURE: 98 F

## 2025-05-27 DIAGNOSIS — K50.10 CROHN'S DISEASE OF LARGE INTESTINE WITHOUT COMPLICATION (HCC): ICD-10-CM

## 2025-05-27 DIAGNOSIS — E55.9 VITAMIN D DEFICIENCY: ICD-10-CM

## 2025-05-27 DIAGNOSIS — E66.01 MORBID OBESITY (HCC): Primary | ICD-10-CM

## 2025-05-27 PROCEDURE — 99214 OFFICE O/P EST MOD 30 MIN: CPT | Performed by: FAMILY MEDICINE

## 2025-05-27 RX ORDER — CLINDAMYCIN PHOSPHATE 10 UG/ML
LOTION TOPICAL 2 TIMES DAILY
COMMUNITY
Start: 2025-02-25 | End: 2026-02-25

## 2025-05-29 ENCOUNTER — TELEPHONE (OUTPATIENT)
Age: 25
End: 2025-05-29

## 2025-05-29 NOTE — ASSESSMENT & PLAN NOTE
Chronic improved compared with before patient understood plan 50 mg she had intolerance GI problem recommend to decrease the dose to 12.5 mg injection weekly BMI is still high here we discussed referral to bariatric surgeon and she declined  Orders:  •  Cortisol Level,7-9 AM Specimen; Future

## 2025-05-29 NOTE — PROGRESS NOTES
Name: Pamella Stein      : 2000      MRN: [id number]  Encounter Provider: Melchor Epstein MD  Encounter Date: 2025   Encounter department: [facility] PRIMARY CARE  :  Assessment & Plan  Morbid obesity (HCC)  Chronic improved compared with before patient understood plan 50 mg she had intolerance GI problem recommend to decrease the dose to 12.5 mg injection weekly BMI is still high here we discussed referral to bariatric surgeon and she declined  Orders:  •  Cortisol Level,7-9 AM Specimen; Future    Vitamin D deficiency  Chronic continue vitamin D supplement she is due for vitamin D level with the next appointment       Crohn's disease of large intestine without complication (HCC)  Chronic stable no recent exacerbation follow-up with the GI periodically         Assessment & Plan           History of Present Illness   Patient here for follow-up and she is concerned about her GI problem since increase the dose of the to 50 mg weekly injection she started having nausea and dyspepsia no abdominal pain no fever no abdomen distention patient with history of Crohn disease follow-up with the GI periodically but well recent exacerbation she had a history of vitamin D deficiency continue vitamin D supplement      Review of Systems   Constitutional:  Negative for activity change, appetite change, fatigue and fever.   HENT:  Negative for congestion, ear pain, sinus pressure, sinus pain and sore throat.    Eyes:  Negative for discharge and redness.   Respiratory:  Negative for cough, chest tightness and shortness of breath.    Cardiovascular:  Negative for chest pain, palpitations and leg swelling.   Gastrointestinal:  Positive for nausea. Negative for abdominal pain, constipation and diarrhea.   Genitourinary:  Negative for dysuria, flank pain, frequency and hematuria.   Musculoskeletal:  Negative for gait problem.   Skin:  Negative for pallor and rash.   Neurological:  Negative for dizziness, tremors,  "weakness, numbness and headaches.   Hematological:  Does not bruise/bleed easily.       Objective   /76 (BP Location: Left arm, Patient Position: Sitting, Cuff Size: Standard)   Pulse 75   Temp 98 °F (36.7 °C) (Tympanic)   Ht 5' 4.57\" (1.64 m)   Wt 110 kg (242 lb 6.4 oz)   SpO2 95%   BMI 40.88 kg/m²      Physical Exam  Vitals and nursing note reviewed.   Constitutional:       General: She is not in acute distress.     Appearance: She is well-developed. She is not diaphoretic.   HENT:      Head: Normocephalic.      Right Ear: Tympanic membrane and external ear normal.      Left Ear: Tympanic membrane and external ear normal.      Nose: No rhinorrhea.      Mouth/Throat:      Pharynx: No posterior oropharyngeal erythema.     Eyes:      General:         Right eye: No discharge.         Left eye: No discharge.      Conjunctiva/sclera: Conjunctivae normal.     Neck:      Vascular: No JVD.     Cardiovascular:      Rate and Rhythm: Normal rate and regular rhythm.      Heart sounds: Normal heart sounds. No murmur heard.     No gallop.   Pulmonary:      Effort: Pulmonary effort is normal. No respiratory distress.      Breath sounds: Normal breath sounds. No wheezing.   Abdominal:      General: There is no distension.      Palpations: Abdomen is soft.      Tenderness: There is no abdominal tenderness. There is no rebound.     Musculoskeletal:         General: No tenderness.      Cervical back: Normal range of motion and neck supple.   Lymphadenopathy:      Cervical: No cervical adenopathy.     Skin:     General: Skin is warm.      Findings: No rash.     Neurological:      Mental Status: She is alert and oriented to person, place, and time.         "

## 2025-05-29 NOTE — TELEPHONE ENCOUNTER
Patient called in inquiring about Zepbound patient wanted to know status of prior auth upon review no decision in chart yet discussed with patient she understood and had no further question

## 2025-06-03 ENCOUNTER — APPOINTMENT (OUTPATIENT)
Dept: LAB | Facility: HOSPITAL | Age: 25
End: 2025-06-03
Payer: MEDICARE

## 2025-06-03 DIAGNOSIS — E66.01 MORBID OBESITY (HCC): ICD-10-CM

## 2025-06-03 DIAGNOSIS — K51.319 ULCERATIVE RECTOSIGMOIDITIS WITH COMPLICATION (HCC): ICD-10-CM

## 2025-06-03 DIAGNOSIS — Z00.01 ENCOUNTER FOR GENERAL ADULT MEDICAL EXAMINATION WITH ABNORMAL FINDINGS: ICD-10-CM

## 2025-06-03 DIAGNOSIS — Z13.29 THYROID DISORDER SCREEN: ICD-10-CM

## 2025-06-03 DIAGNOSIS — K21.9 GASTROESOPHAGEAL REFLUX DISEASE, UNSPECIFIED WHETHER ESOPHAGITIS PRESENT: ICD-10-CM

## 2025-06-03 DIAGNOSIS — Z13.220 SCREENING, LIPID: ICD-10-CM

## 2025-06-03 DIAGNOSIS — E55.9 VITAMIN D DEFICIENCY: ICD-10-CM

## 2025-06-03 LAB
25(OH)D3 SERPL-MCNC: 30.3 NG/ML (ref 30–100)
ALBUMIN SERPL BCG-MCNC: 4.2 G/DL (ref 3.5–5)
ALP SERPL-CCNC: 53 U/L (ref 34–104)
ALT SERPL W P-5'-P-CCNC: 17 U/L (ref 7–52)
ANION GAP SERPL CALCULATED.3IONS-SCNC: 8 MMOL/L (ref 4–13)
AST SERPL W P-5'-P-CCNC: 16 U/L (ref 13–39)
BASOPHILS # BLD AUTO: 0.07 THOUSANDS/ÂΜL (ref 0–0.1)
BASOPHILS NFR BLD AUTO: 1 % (ref 0–1)
BILIRUB SERPL-MCNC: 0.56 MG/DL (ref 0.2–1)
BUN SERPL-MCNC: 20 MG/DL (ref 5–25)
CALCIUM SERPL-MCNC: 9.7 MG/DL (ref 8.4–10.2)
CHLORIDE SERPL-SCNC: 102 MMOL/L (ref 96–108)
CHOLEST SERPL-MCNC: 144 MG/DL (ref ?–200)
CO2 SERPL-SCNC: 27 MMOL/L (ref 21–32)
CORTIS AM PEAK SERPL-MCNC: 15.4 UG/DL (ref 6.7–22.6)
CREAT SERPL-MCNC: 0.76 MG/DL (ref 0.6–1.3)
CRP SERPL QL: 16.2 MG/L
EOSINOPHIL # BLD AUTO: 0.1 THOUSAND/ÂΜL (ref 0–0.61)
EOSINOPHIL NFR BLD AUTO: 1 % (ref 0–6)
ERYTHROCYTE [DISTWIDTH] IN BLOOD BY AUTOMATED COUNT: 12.8 % (ref 11.6–15.1)
GFR SERPL CREATININE-BSD FRML MDRD: 108 ML/MIN/1.73SQ M
GLUCOSE P FAST SERPL-MCNC: 101 MG/DL (ref 65–99)
HCT VFR BLD AUTO: 39.7 % (ref 34.8–46.1)
HDLC SERPL-MCNC: 45 MG/DL
HGB BLD-MCNC: 12.4 G/DL (ref 11.5–15.4)
IMM GRANULOCYTES # BLD AUTO: 0.04 THOUSAND/UL (ref 0–0.2)
IMM GRANULOCYTES NFR BLD AUTO: 0 % (ref 0–2)
IRON SERPL-MCNC: 69 UG/DL (ref 50–212)
LDLC SERPL CALC-MCNC: 76 MG/DL (ref 0–100)
LYMPHOCYTES # BLD AUTO: 1.53 THOUSANDS/ÂΜL (ref 0.6–4.47)
LYMPHOCYTES NFR BLD AUTO: 16 % (ref 14–44)
MCH RBC QN AUTO: 29.5 PG (ref 26.8–34.3)
MCHC RBC AUTO-ENTMCNC: 31.2 G/DL (ref 31.4–37.4)
MCV RBC AUTO: 94 FL (ref 82–98)
MONOCYTES # BLD AUTO: 0.69 THOUSAND/ÂΜL (ref 0.17–1.22)
MONOCYTES NFR BLD AUTO: 7 % (ref 4–12)
NEUTROPHILS # BLD AUTO: 7.28 THOUSANDS/ÂΜL (ref 1.85–7.62)
NEUTS SEG NFR BLD AUTO: 75 % (ref 43–75)
NRBC BLD AUTO-RTO: 0 /100 WBCS
PLATELET # BLD AUTO: 313 THOUSANDS/UL (ref 149–390)
PMV BLD AUTO: 9.5 FL (ref 8.9–12.7)
POTASSIUM SERPL-SCNC: 3.7 MMOL/L (ref 3.5–5.3)
PROT SERPL-MCNC: 7.4 G/DL (ref 6.4–8.4)
RBC # BLD AUTO: 4.21 MILLION/UL (ref 3.81–5.12)
SODIUM SERPL-SCNC: 137 MMOL/L (ref 135–147)
TRIGL SERPL-MCNC: 113 MG/DL (ref ?–150)
TSH SERPL DL<=0.05 MIU/L-ACNC: 1.38 UIU/ML (ref 0.45–4.5)
VIT B12 SERPL-MCNC: 436 PG/ML (ref 180–914)
WBC # BLD AUTO: 9.71 THOUSAND/UL (ref 4.31–10.16)

## 2025-06-03 PROCEDURE — 80053 COMPREHEN METABOLIC PANEL: CPT

## 2025-06-03 PROCEDURE — 36415 COLL VENOUS BLD VENIPUNCTURE: CPT

## 2025-06-03 PROCEDURE — 83540 ASSAY OF IRON: CPT

## 2025-06-03 PROCEDURE — 86140 C-REACTIVE PROTEIN: CPT

## 2025-06-03 PROCEDURE — 82533 TOTAL CORTISOL: CPT

## 2025-06-03 PROCEDURE — 85025 COMPLETE CBC W/AUTO DIFF WBC: CPT

## 2025-06-03 PROCEDURE — 80061 LIPID PANEL: CPT

## 2025-06-03 PROCEDURE — 82306 VITAMIN D 25 HYDROXY: CPT

## 2025-06-03 PROCEDURE — 84443 ASSAY THYROID STIM HORMONE: CPT

## 2025-06-03 PROCEDURE — 82607 VITAMIN B-12: CPT

## 2025-06-04 ENCOUNTER — RESULTS FOLLOW-UP (OUTPATIENT)
Age: 25
End: 2025-06-04

## 2025-06-04 ENCOUNTER — APPOINTMENT (OUTPATIENT)
Dept: LAB | Facility: HOSPITAL | Age: 25
End: 2025-06-04

## 2025-06-05 ENCOUNTER — PATIENT MESSAGE (OUTPATIENT)
Dept: FAMILY MEDICINE CLINIC | Facility: CLINIC | Age: 25
End: 2025-06-05

## 2025-06-09 ENCOUNTER — TELEPHONE (OUTPATIENT)
Age: 25
End: 2025-06-09

## 2025-06-09 NOTE — PATIENT COMMUNICATION
Pt returned call to check on her Zepbound medication status did relay the chart note she understood. Once you have an update reach out to patient. Thanks

## 2025-06-09 NOTE — TELEPHONE ENCOUNTER
[facility] for the last office notes that shows the patients weight, body mass index and diagnosis. This request is for the [facility] auth to get this approved. Please fax this to [phone]. Please review and advise.

## 2025-06-23 DIAGNOSIS — K21.9 GASTROESOPHAGEAL REFLUX DISEASE, UNSPECIFIED WHETHER ESOPHAGITIS PRESENT: ICD-10-CM

## 2025-06-23 RX ORDER — OMEPRAZOLE 40 MG/1
40 CAPSULE, DELAYED RELEASE ORAL DAILY
Qty: 90 CAPSULE | Refills: 1 | Status: SHIPPED | OUTPATIENT
Start: 2025-06-23

## 2025-06-30 DIAGNOSIS — K51.319 ULCERATIVE RECTOSIGMOIDITIS WITH COMPLICATION (HCC): ICD-10-CM

## 2025-06-30 DIAGNOSIS — E61.1 IRON DEFICIENCY: ICD-10-CM

## 2025-06-30 RX ORDER — FERROUS SULFATE 325(65) MG
1 TABLET, DELAYED RELEASE (ENTERIC COATED) ORAL
Qty: 90 TABLET | Refills: 1 | Status: SHIPPED | OUTPATIENT
Start: 2025-06-30

## 2025-07-02 DIAGNOSIS — E66.01 MORBID OBESITY (HCC): ICD-10-CM

## 2025-07-03 NOTE — TELEPHONE ENCOUNTER
Requested medication(s) are due for refill today: Yes  **If antibiotic or given during sick visit, contact patient to discuss current symptoms.   **Confirm prescribing provider    LOV:  05/27/2025  **If longer then 1 year, contact patient to schedule annual PRIOR to refilling. Once scheduled, adjust refill for 30 days, no refills.  **Update CareEverywhere to confirm not being seen elsewhere    NOV:  07/23/2025    Is patient due for annual visit: Yes, describe: Scheduled 12/19/2025  **If future appointment, adjust to annual/follow up.  ** No appointment call to schedule annual/follow up.    Route to PCP, unless PCP no longer here, then physician they are seeing next.

## 2025-07-07 RX ORDER — TIRZEPATIDE 10 MG/.5ML
10 INJECTION, SOLUTION SUBCUTANEOUS WEEKLY
Qty: 2 ML | Refills: 0 | Status: SHIPPED | OUTPATIENT
Start: 2025-07-07

## 2025-07-08 ENCOUNTER — TELEPHONE (OUTPATIENT)
Age: 25
End: 2025-07-08

## 2025-07-08 NOTE — TELEPHONE ENCOUNTER
PA for Zepbound 10 MG/0.5ML auto-injecto SUBMITTED to     via    [x]CM-KEY:  [id number]  []Surescripts-Case ID #   []Availity-Auth ID # NDC #   []Faxed to plan   []Other website   []Phone call Case ID #     [x]PA sent as URGENT    All office notes, labs and other pertaining documents and studies sent. Clinical questions answered. Awaiting determination from insurance company.     Turnaround time for your insurance to make a decision on your Prior Authorization can take 7-21 business days.

## 2025-07-10 NOTE — TELEPHONE ENCOUNTER
PA for zepbound 10mg APPROVED     Date(s) approved 7/9/25-10/9/25    Case #[id number]    Patient advised by          []MyChart Message  [x]Phone call   []LMOM  []L/M to call office as no active Communication consent on file  []Unable to leave detailed message as VM not approved on Communication consent       Pharmacy advised by    [x]Fax  []Phone call  []Secure Chat    Specialty Pharmacy    []     Approval letter scanned into Media Yes

## 2025-07-31 DIAGNOSIS — E66.01 MORBID OBESITY (HCC): ICD-10-CM

## 2025-08-01 RX ORDER — TIRZEPATIDE 10 MG/.5ML
10 INJECTION, SOLUTION SUBCUTANEOUS WEEKLY
Qty: 2 ML | Refills: 0 | Status: SHIPPED | OUTPATIENT
Start: 2025-08-01

## 2025-08-12 ENCOUNTER — NURSE TRIAGE (OUTPATIENT)
Age: 25
End: 2025-08-12